# Patient Record
Sex: FEMALE | Race: BLACK OR AFRICAN AMERICAN | Employment: OTHER | URBAN - METROPOLITAN AREA
[De-identification: names, ages, dates, MRNs, and addresses within clinical notes are randomized per-mention and may not be internally consistent; named-entity substitution may affect disease eponyms.]

---

## 2017-01-05 RX ORDER — ESOMEPRAZOLE MAGNESIUM 40 MG/1
40 CAPSULE, DELAYED RELEASE ORAL DAILY
Qty: 90 CAP | Refills: 3 | Status: SHIPPED | OUTPATIENT
Start: 2017-01-05 | End: 2017-04-21 | Stop reason: SDUPTHER

## 2017-01-05 RX ORDER — PIOGLITAZONE HCL AND METFORMIN HCL 500; 15 MG/1; MG/1
1 TABLET ORAL 2 TIMES DAILY WITH MEALS
Qty: 180 TAB | Refills: 3 | Status: SHIPPED | OUTPATIENT
Start: 2017-01-05 | End: 2017-04-21 | Stop reason: SDUPTHER

## 2017-01-05 RX ORDER — FUROSEMIDE 40 MG/1
40 TABLET ORAL DAILY
Qty: 90 TAB | Refills: 3 | Status: SHIPPED | OUTPATIENT
Start: 2017-01-05 | End: 2017-04-21 | Stop reason: SDUPTHER

## 2017-01-05 RX ORDER — SPIRONOLACTONE 25 MG/1
25 TABLET ORAL 2 TIMES DAILY
Qty: 180 TAB | Refills: 3 | Status: SHIPPED | OUTPATIENT
Start: 2017-01-05 | End: 2017-04-21 | Stop reason: SDUPTHER

## 2017-01-20 ENCOUNTER — OFFICE VISIT (OUTPATIENT)
Dept: FAMILY MEDICINE CLINIC | Age: 77
End: 2017-01-20

## 2017-01-20 VITALS
TEMPERATURE: 96.9 F | BODY MASS INDEX: 37.38 KG/M2 | RESPIRATION RATE: 18 BRPM | SYSTOLIC BLOOD PRESSURE: 166 MMHG | HEART RATE: 64 BPM | HEIGHT: 61 IN | OXYGEN SATURATION: 100 % | WEIGHT: 198 LBS | DIASTOLIC BLOOD PRESSURE: 67 MMHG

## 2017-01-20 DIAGNOSIS — N18.30 ANEMIA OF CHRONIC KIDNEY FAILURE, STAGE 3 (MODERATE) (HCC): ICD-10-CM

## 2017-01-20 DIAGNOSIS — D63.1 ANEMIA OF CHRONIC KIDNEY FAILURE, STAGE 3 (MODERATE) (HCC): ICD-10-CM

## 2017-01-20 DIAGNOSIS — N18.30 CONTROLLED TYPE 2 DIABETES MELLITUS WITH STAGE 3 CHRONIC KIDNEY DISEASE, WITHOUT LONG-TERM CURRENT USE OF INSULIN (HCC): Primary | ICD-10-CM

## 2017-01-20 DIAGNOSIS — I10 ESSENTIAL HYPERTENSION: ICD-10-CM

## 2017-01-20 DIAGNOSIS — E11.22 CONTROLLED TYPE 2 DIABETES MELLITUS WITH STAGE 3 CHRONIC KIDNEY DISEASE, WITHOUT LONG-TERM CURRENT USE OF INSULIN (HCC): Primary | ICD-10-CM

## 2017-01-20 LAB
HBA1C MFR BLD HPLC: 5.2 %
HGB BLD-MCNC: 8.3 G/DL

## 2017-01-20 RX ORDER — FLUTICASONE PROPIONATE 50 MCG
2 SPRAY, SUSPENSION (ML) NASAL EVERY EVENING
Qty: 3 BOTTLE | Refills: 3 | Status: SHIPPED | OUTPATIENT
Start: 2017-01-20 | End: 2018-09-07 | Stop reason: ALTCHOICE

## 2017-01-20 RX ORDER — BROMPHENIRAMINE MALEATE, PSEUDOEPHEDRINE HYDROCHLORIDE, AND DEXTROMETHORPHAN HYDROBROMIDE 2; 30; 10 MG/5ML; MG/5ML; MG/5ML
5 SYRUP ORAL
Qty: 120 ML | Refills: 1 | Status: SHIPPED | OUTPATIENT
Start: 2017-01-20 | End: 2018-03-20 | Stop reason: ALTCHOICE

## 2017-01-20 NOTE — PROGRESS NOTES
Chief Complaint   Patient presents with    Dizziness    Anemia    Ear Fullness     Pain scale 0/10      1. Have you been to the ER, urgent care clinic since your last visit? Hospitalized since your last visit? No    2. Have you seen or consulted any other health care providers outside of the [address] [place] [address] since your last visit? Include any pap smears or colon screening.  No

## 2017-01-20 NOTE — PROGRESS NOTES
HISTORY OF PRESENT ILLNESS  Latha Rivera is a 77 y.o. female. HPI  C/o fatigue, dizziness  hbp stable  ckd stable  Review of Systems   Constitutional: Positive for malaise/fatigue. Neurological: Positive for dizziness. All other systems reviewed and are negative. Past Medical History   Diagnosis Date    Cancer ([facility]      colon    Diabetes ([facility]     Hypertension      Current Outpatient Prescriptions on File Prior to Visit   Medication Sig Dispense Refill    Iron BisGl &PS Eoa-F-O06-FA-Ca (Carlos Ahumada) 656-11-73-0 mg-mg-mcg-mg cap Take 2 Caps by mouth daily. 180 Cap 1    furosemide (LASIX) 40 mg tablet Take 1 Tab by mouth daily. 90 Tab 3    esomeprazole (NEXIUM) 40 mg capsule Take 1 Cap by mouth daily. 90 Cap 3    spironolactone (ALDACTONE) 25 mg tablet Take 1 Tab by mouth two (2) times a day. 180 Tab 3    pioglitazone-metFORMIN (ACTOPLUS MET)  mg per tablet Take 1 Tab by mouth two (2) times daily (with meals). 180 Tab 3    diclofenac EC (VOLTAREN) 75 mg EC tablet Take 1 Tab by mouth once over twenty-four (24) hours. 30 Tab 1    telmisartan (MICARDIS) 80 mg tablet Take 1 Tab by mouth daily. 90 Tab 3    colesevelam (WELCHOL) 625 mg tablet Take 2 Tabs by mouth two (2) times daily (with meals). 360 Tab 1    glucose blood VI test strips (FREESTYLE LITE STRIPS) strip Test daily 100 strip 3    travoprost (TRAVATAN Z) 0.004 % ophthalmic solution Administer 1 Drop to both eyes every evening. 5 mL 3    aspirin 81 mg tablet Take 1 Tab by mouth daily. With a meal 90 Tab 0     No current facility-administered medications on file prior to visit. Visit Vitals    /67 (BP 1 Location: Right arm, BP Patient Position: Sitting)    Pulse 64    Temp 96.9 °F (36.1 °C) (Oral)    Resp 18    Ht 5' 1\" (1.549 m)    Wt 198 lb (89.8 kg)    SpO2 100%    BMI 37.41 kg/m2         Physical Exam   Constitutional: She appears well-developed and well-nourished. No distress.    Musculoskeletal: Normal range of motion. She exhibits no edema, tenderness or deformity. Skin: Skin is warm and dry. No rash noted. She is not diaphoretic. No erythema. There is pallor. Vitals reviewed.     Reviewed labs  Anemia of kidney dis  hgb 8.3  A1C 5.2  ASSESSMENT and PLAN  anemia   aodm  hbp  Plan  Tstart procrit

## 2017-01-20 NOTE — MR AVS SNAPSHOT
Visit Information Date & Time Provider Department Dept. Phone Encounter #  
 1/20/2017  9:45 AM Anai Olivares, [employer] [phone] [id number] Upcoming Health Maintenance Date Due INFLUENZA AGE 9 TO ADULT 8/1/2016 FOOT EXAM Q1 5/4/2017 HEMOGLOBIN A1C Q6M 7/20/2017 MEDICARE YEARLY EXAM 8/6/2017 MICROALBUMIN Q1 8/23/2017 LIPID PANEL Q1 8/23/2017 EYE EXAM RETINAL OR DILATED Q1 10/11/2017 Pneumococcal 65+ Low/Medium Risk (2 of 2 - PPSV23) 1/20/2018 GLAUCOMA SCREENING Q2Y 10/11/2018 DTaP/Tdap/Td series (2 - Td) 8/17/2025 Allergies as of 1/20/2017  Review Complete On: 1/20/2017 By: Anai Olivares MD  
  
 Severity Noted Reaction Type Reactions Adhesive  10/05/2016    Other (comments) Skin tears Amoxicillin  09/28/2010    Hives Current Immunizations  Reviewed on 8/17/2015 Name Date Influenza Vaccine 10/24/2013  2:45 PM  
 Tdap 8/17/2015 11:51 AM  
  
 Not reviewed this visit You Were Diagnosed With   
  
 Codes Comments Controlled type 2 diabetes mellitus with stage 3 chronic kidney disease, without long-term current use of insulin (HCC)    -  Primary ICD-10-CM: E11.22, N18.3 ICD-9-CM: 250.40, 585.3 Essential hypertension     ICD-10-CM: I10 
ICD-9-CM: 401.9 Anemia of chronic kidney failure, stage 3 (moderate)     ICD-10-CM: N18.3, D63.1 ICD-9-CM: 285.21, 585.3 Vitals BP Pulse Temp Resp Height(growth percentile) Weight(growth percentile) 166/67 (BP 1 Location: Right arm, BP Patient Position: Sitting) 64 96.9 °F (36.1 °C) (Oral) 18 5' 1\" (1.549 m) 198 lb (89.8 kg) SpO2 BMI OB Status Smoking Status 100% 37.41 kg/m2 Postmenopausal Never Smoker Vitals History BMI and BSA Data Body Mass Index Body Surface Area  
 37.41 kg/m 2 1.97 m 2 Preferred Pharmacy Pharmacy Name Phone  [address] [facility] [phone] Your Updated Medication List  
  
   
This list is accurate as of: 17 10:49 AM.  Always use your most recent med list.  
  
  
  
  
 aspirin 81 mg tablet Take 1 Tab by mouth daily. With a meal  
  
 Brompheniramine-Pseudoeph-DM 2-30-10 mg/5 mL syrup Commonly known as:  BROMFED DM Take 5 mL by mouth four (4) times daily as needed. colesevelam 625 mg tablet Commonly known as:  [facility] Take 2 Tabs by mouth two (2) times daily (with meals). diclofenac EC 75 mg EC tablet Commonly known as:  VOLTAREN Take 1 Tab by mouth once over twenty-four (24) hours. epoetin jasmin 40,000 unit/mL injection Commonly known as:  PROCRIT  
1 mL by SubCUTAneous route once for 1 dose. esomeprazole 40 mg capsule Commonly known as:  NexIUM Take 1 Cap by mouth daily. fluticasone 50 mcg/actuation nasal spray Commonly known as:  Mounika Frdemetrius 2 Sprays by Both Nostrils route every evening. furosemide 40 mg tablet Commonly known as:  LASIX Take 1 Tab by mouth daily. glucose blood VI test strips strip Commonly known as:  FREESTYLE LITE STRIPS Test daily Iron BisGl &PS Ylo-D-G33-FA-Ca 389-46-22-6 mg-mg-mcg-mg Cap Commonly known as:  Fairy Ficks Take 2 Caps by mouth daily. pioglitazone-metFORMIN  mg per tablet Commonly known as:  actoplus met Take 1 Tab by mouth two (2) times daily (with meals). spironolactone 25 mg tablet Commonly known as:  ALDACTONE Take 1 Tab by mouth two (2) times a day. telmisartan 80 mg tablet Commonly known as:  Monzon Sarkar Take 1 Tab by mouth daily. travoprost 0.004 % ophthalmic solution Commonly known as:  TRAVATAN Z Administer 1 Drop to both eyes every evening. Prescriptions Sent to Pharmacy Refills  
 fluticasone (FLONASE) 50 mcg/actuation nasal spray 3 Si Sprays by Both Nostrils route every evening.   
 Class: Normal  
 Pharmacy: [address], [address] [place] [address] Ph #: [phone] Route: Both Nostrils Brompheniramine-Pseudoeph-DM (BROMFED DM) 2-30-10 mg/5 mL syrup 1 Sig: Take 5 mL by mouth four (4) times daily as needed. Class: Normal  
 Pharmacy: [address], [address] [place] [address] Ph #: [phone] Route: Oral  
  
We Performed the Following AMB POC HEMOGLOBIN (HGB) [70612 CPT(R)] AMB POC HEMOGLOBIN A1C [68315 CPT(R)] Introducing [facility] & HEALTH SERVICES! [employer] introduces [employer] patient portal. Now you can access parts of your medical record, email your doctor's office, and request medication refills online. 1. In your internet browser, go to [email or web address] 2. Click on the First Time User? Click Here link in the Sign In box. You will see the New Member Sign Up page. 3. Enter your [employer] Access Code exactly as it appears below. You will not need to use this code after youve completed the sign-up process. If you do not sign up before the expiration date, you must request a new code. · [employer] Access Code: [email or web address] Expires: 2/2/2017 10:19 AM 
 
4. Enter the last four digits of your Social Security Number (xxxx) and Date of Birth (mm/dd/yyyy) as indicated and click Submit. You will be taken to the next sign-up page. 5. Create a [employer] ID. This will be your [employer] login ID and cannot be changed, so think of one that is secure and easy to remember. 6. Create a [employer] password. You can change your password at any time. 7. Enter your Password Reset Question and Answer. This can be used at a later time if you forget your password. 8. Enter your e-mail address. You will receive e-mail notification when new information is available in [address] 9. Click Sign Up. You can now view and download portions of your medical record. 10. Click the Download Summary menu link to download a portable copy of your medical information. If you have questions, please visit the Frequently Asked Questions section of the [employer] website. Remember, [employer] is NOT to be used for urgent needs. For medical emergencies, dial 911. Now available from your iPhone and Android! Please provide this summary of care documentation to your next provider. Your primary care clinician is listed as Gwyn Herbert. If you have any questions after today's visit, please call [phone].

## 2017-02-27 ENCOUNTER — TELEPHONE (OUTPATIENT)
Dept: FAMILY MEDICINE CLINIC | Age: 77
End: 2017-02-27

## 2017-02-28 ENCOUNTER — OFFICE VISIT (OUTPATIENT)
Dept: FAMILY MEDICINE CLINIC | Age: 77
End: 2017-02-28

## 2017-02-28 VITALS
TEMPERATURE: 97.6 F | BODY MASS INDEX: 36.44 KG/M2 | SYSTOLIC BLOOD PRESSURE: 179 MMHG | RESPIRATION RATE: 20 BRPM | HEIGHT: 61 IN | WEIGHT: 193 LBS | HEART RATE: 71 BPM | DIASTOLIC BLOOD PRESSURE: 61 MMHG

## 2017-02-28 DIAGNOSIS — J01.90 ACUTE SINUSITIS, RECURRENCE NOT SPECIFIED, UNSPECIFIED LOCATION: Primary | ICD-10-CM

## 2017-02-28 DIAGNOSIS — D64.9 CHRONIC ANEMIA: ICD-10-CM

## 2017-02-28 LAB — HGB BLD-MCNC: 10.2 G/DL

## 2017-02-28 RX ORDER — BROMPHENIRAMINE MALEATE, PSEUDOEPHEDRINE HYDROCHLORIDE, AND DEXTROMETHORPHAN HYDROBROMIDE 2; 30; 10 MG/5ML; MG/5ML; MG/5ML
5 SYRUP ORAL
Qty: 120 ML | Refills: 1 | Status: SHIPPED | OUTPATIENT
Start: 2017-02-28 | End: 2017-05-05 | Stop reason: SDUPTHER

## 2017-02-28 RX ORDER — AZITHROMYCIN 500 MG/1
500 TABLET, FILM COATED ORAL DAILY
Qty: 6 TAB | Refills: 0 | Status: SHIPPED | OUTPATIENT
Start: 2017-02-28 | End: 2017-03-06

## 2017-02-28 NOTE — PROGRESS NOTES
Chief Complaint   Patient presents with    Cough    Pressure Behind the Eyes     Pain scale 0/10      1. Have you been to the ER, urgent care clinic since your last visit? Hospitalized since your last visit? No    2. Have you seen or consulted any other health care providers outside of the [facility] since your last visit? Include any pap smears or colon screening.  No

## 2017-02-28 NOTE — MR AVS SNAPSHOT
Visit Information Date & Time Provider Department Dept. Phone Encounter #  
 2/28/2017 11:15 AM Zandra Ecketr MD [address] [place] [address] [phone] [id number] Follow-up Instructions Return if symptoms worsen or fail to improve. Follow-up and Disposition History Upcoming Health Maintenance Date Due INFLUENZA AGE 9 TO ADULT 8/1/2016 Pneumococcal 65+ High/Highest Risk (2 of 2 - PPSV23) 4/25/2017 FOOT EXAM Q1 5/4/2017 HEMOGLOBIN A1C Q6M 7/20/2017 MEDICARE YEARLY EXAM 8/6/2017 MICROALBUMIN Q1 8/23/2017 LIPID PANEL Q1 8/23/2017 EYE EXAM RETINAL OR DILATED Q1 10/11/2017 GLAUCOMA SCREENING Q2Y 10/11/2018 DTaP/Tdap/Td series (2 - Td) 8/17/2025 Allergies as of 2/28/2017  Review Complete On: 2/28/2017 By: Zandra Eckert MD  
  
 Severity Noted Reaction Type Reactions Adhesive  10/05/2016    Other (comments) Skin tears Amoxicillin  09/28/2010    Hives Current Immunizations  Reviewed on 8/17/2015 Name Date Influenza Vaccine 10/24/2013  2:45 PM  
 Tdap 8/17/2015 11:51 AM  
  
 Not reviewed this visit You Were Diagnosed With   
  
 Codes Comments Acute sinusitis, recurrence not specified, unspecified location    -  Primary ICD-10-CM: J01.90 ICD-9-CM: 461.9 Chronic anemia     ICD-10-CM: D64.9 ICD-9-CM: 186. 9 Vitals BP  
  
  
  
  
  
 179/61 (BP 1 Location: Right arm, BP Patient Position: Sitting) BMI and BSA Data Body Mass Index Body Surface Area  
 36.47 kg/m 2 1.94 m 2 Preferred Pharmacy Pharmacy Name Phone [id number] [address], [place] [address] [address] [phone] Your Updated Medication List  
  
   
This list is accurate as of: 2/28/17 11:39 AM.  Always use your most recent med list.  
  
  
  
  
 aspirin 81 mg tablet Take 1 Tab by mouth daily. With a meal  
  
 azithromycin 500 mg Tab Commonly known as:  Sherwin Littlejohn Take 1 Tab by mouth daily for 6 days. * Brompheniramine-Pseudoeph-DM 2-30-10 mg/5 mL syrup Commonly known as:  BROMFED DM Take 5 mL by mouth four (4) times daily as needed. * Brompheniramine-Pseudoeph-DM 2-30-10 mg/5 mL syrup Commonly known as:  BROMFED DM Take 5 mL by mouth four (4) times daily as needed. colesevelam 625 mg tablet Commonly known as:  [facility] Take 2 Tabs by mouth two (2) times daily (with meals). diclofenac EC 75 mg EC tablet Commonly known as:  VOLTAREN Take 1 Tab by mouth once over twenty-four (24) hours. esomeprazole 40 mg capsule Commonly known as:  NexIUM Take 1 Cap by mouth daily. fluticasone 50 mcg/actuation nasal spray Commonly known as:  Gus Gerda 2 Sprays by Both Nostrils route every evening. furosemide 40 mg tablet Commonly known as:  LASIX Take 1 Tab by mouth daily. glucose blood VI test strips strip Commonly known as:  FREESTYLE LITE STRIPS Test daily Iron BisGl &PS Tfl-G-E91-FA-Ca 258-39-86-5 mg-mg-mcg-mg Cap Commonly known as:  Fletcher Roughen Take 2 Caps by mouth daily. pioglitazone-metFORMIN  mg per tablet Commonly known as:  actoplus met Take 1 Tab by mouth two (2) times daily (with meals). spironolactone 25 mg tablet Commonly known as:  ALDACTONE Take 1 Tab by mouth two (2) times a day. telmisartan 80 mg tablet Commonly known as:  Donice Quirk Take 1 Tab by mouth daily. travoprost 0.004 % ophthalmic solution Commonly known as:  TRAVATAN Z Administer 1 Drop to both eyes every evening. * Notice: This list has 2 medication(s) that are the same as other medications prescribed for you. Read the directions carefully, and ask your doctor or other care provider to review them with you. Prescriptions Sent to Pharmacy Refills  
 azithromycin (ZITHROMAX) 500 mg tab 0 Sig: Take 1 Tab by mouth daily for 6 days.   
 Class: Normal  
 [facility]: [id number] [address] [place] [address] Ph #: [phone] Route: Oral  
 Brompheniramine-Pseudoeph-DM (BROMFED DM) 2-30-10 mg/5 mL syrup 1 Sig: Take 5 mL by mouth four (4) times daily as needed. Class: Normal  
 [facility]acy: [facility] [address] [place] [address] Ph #: [phone] Route: Oral  
  
We Performed the Following AMB POC HEMOGLOBIN (HGB) [46676 CPT(R)] Follow-up Instructions Return if symptoms worsen or fail to improve. Introducing [facility] & HEALTH SERVICES! Rosalind Ny introduces [employer] patient portal. Now you can access parts of your medical record, email your doctor's office, and request medication refills online. 1. In your internet browser, go to [email or web address] 2. Click on the First Time User? Click Here link in the Sign In box. You will see the New Member Sign Up page. 3. Enter your [employer] Access Code exactly as it appears below. You will not need to use this code after youve completed the sign-up process. If you do not sign up before the expiration date, you must request a new code. · [employer] Access Code: [email or web address] Expires: 5/29/2017 11:39 AM 
 
4. Enter the last four digits of your Social Security Number (xxxx) and Date of Birth (mm/dd/yyyy) as indicated and click Submit. You will be taken to the next sign-up page. 5. Create a [employer] ID. This will be your [employer] login ID and cannot be changed, so think of one that is secure and easy to remember. 6. Create a [employer] password. You can change your password at any time. 7. Enter your Password Reset Question and Answer. This can be used at a later time if you forget your password. 8. Enter your e-mail address. You will receive e-mail notification when new information is available in [address] 9. Click Sign Up. You can now view and download portions of your medical record. 10. Click the Download Summary menu link to download a portable copy of your medical information. If you have questions, please visit the Frequently Asked Questions section of the [employer] website. Remember, [employer] is NOT to be used for urgent needs. For medical emergencies, dial 911. Now available from your iPhone and Android! Please provide this summary of care documentation to your next provider. Your primary care clinician is listed as Mya Pearson. If you have any questions after today's visit, please call [phone].

## 2017-02-28 NOTE — PROGRESS NOTES
HISTORY OF PRESENT ILLNESS  Selene Naidu is a 77 y.o. female. HPI  Chronic anemia stable  Cough x 2 weeks  A/w sputum  Review of Systems   Respiratory: Positive for cough and sputum production. All other systems reviewed and are negative. Past Medical History:   Diagnosis Date    Cancer ([facility]     colon    Diabetes ([facility]     Hypertension      Current Outpatient Prescriptions on File Prior to Visit   Medication Sig Dispense Refill    fluticasone (FLONASE) 50 mcg/actuation nasal spray 2 Sprays by Both Nostrils route every evening. 3 Bottle 3    Brompheniramine-Pseudoeph-DM (BROMFED DM) 2-30-10 mg/5 mL syrup Take 5 mL by mouth four (4) times daily as needed. 120 mL 1    Iron BisGl &PS Ynt-C-E48-FA-Ca (NANCY FORTE) 746-87-36-0 mg-mg-mcg-mg cap Take 2 Caps by mouth daily. 180 Cap 1    furosemide (LASIX) 40 mg tablet Take 1 Tab by mouth daily. 90 Tab 3    esomeprazole (NEXIUM) 40 mg capsule Take 1 Cap by mouth daily. 90 Cap 3    spironolactone (ALDACTONE) 25 mg tablet Take 1 Tab by mouth two (2) times a day. 180 Tab 3    pioglitazone-metFORMIN (ACTOPLUS MET)  mg per tablet Take 1 Tab by mouth two (2) times daily (with meals). 180 Tab 3    diclofenac EC (VOLTAREN) 75 mg EC tablet Take 1 Tab by mouth once over twenty-four (24) hours. 30 Tab 1    telmisartan (MICARDIS) 80 mg tablet Take 1 Tab by mouth daily. 90 Tab 3    colesevelam (WELCHOL) 625 mg tablet Take 2 Tabs by mouth two (2) times daily (with meals). 360 Tab 1    glucose blood VI test strips (FREESTYLE LITE STRIPS) strip Test daily 100 strip 3    travoprost (TRAVATAN Z) 0.004 % ophthalmic solution Administer 1 Drop to both eyes every evening. 5 mL 3    aspirin 81 mg tablet Take 1 Tab by mouth daily. With a meal 90 Tab 0     No current facility-administered medications on file prior to visit.       Visit Vitals    /61 (BP 1 Location: Right arm, BP Patient Position: Sitting)    Pulse 71    Temp 97.6 °F (36.4 °C) (Oral)    Resp 20  Ht 5' 1\" (1.549 m)    Wt 193 lb (87.5 kg)    BMI 36.47 kg/m2         Physical Exam   Constitutional: She appears well-developed and well-nourished. No distress. HENT:   Head: Normocephalic and atraumatic. Mouth/Throat: No oropharyngeal exudate. Tm dull x 2   Pulmonary/Chest: Effort normal. No respiratory distress. She has no wheezes. She has no rales. She exhibits no tenderness.    + ronchi   Skin: She is not diaphoretic.   hgb-10.2    ASSESSMENT and PLAN  chronic anemia   Acute sinusitis  Plan  antibx  bromfed

## 2017-04-21 RX ORDER — ESOMEPRAZOLE MAGNESIUM 40 MG/1
40 CAPSULE, DELAYED RELEASE ORAL DAILY
Qty: 90 CAP | Refills: 3 | Status: SHIPPED | OUTPATIENT
Start: 2017-04-21 | End: 2017-07-25 | Stop reason: SDUPTHER

## 2017-04-21 RX ORDER — FUROSEMIDE 40 MG/1
40 TABLET ORAL DAILY
Qty: 90 TAB | Refills: 3 | Status: SHIPPED | OUTPATIENT
Start: 2017-04-21 | End: 2017-07-25 | Stop reason: SDUPTHER

## 2017-04-21 RX ORDER — TELMISARTAN 80 MG/1
80 TABLET ORAL DAILY
Qty: 90 TAB | Refills: 3 | Status: SHIPPED | OUTPATIENT
Start: 2017-04-21 | End: 2017-08-30 | Stop reason: SDUPTHER

## 2017-04-21 RX ORDER — PIOGLITAZONE HCL AND METFORMIN HCL 500; 15 MG/1; MG/1
1 TABLET ORAL 2 TIMES DAILY WITH MEALS
Qty: 180 TAB | Refills: 3 | Status: SHIPPED | OUTPATIENT
Start: 2017-04-21 | End: 2017-07-25 | Stop reason: SDUPTHER

## 2017-04-21 RX ORDER — SPIRONOLACTONE 25 MG/1
25 TABLET ORAL 2 TIMES DAILY
Qty: 180 TAB | Refills: 3 | Status: SHIPPED | OUTPATIENT
Start: 2017-04-21 | End: 2017-07-25 | Stop reason: SDUPTHER

## 2017-04-26 ENCOUNTER — TELEPHONE (OUTPATIENT)
Dept: FAMILY MEDICINE CLINIC | Age: 77
End: 2017-04-26

## 2017-04-26 ENCOUNTER — OFFICE VISIT (OUTPATIENT)
Dept: FAMILY MEDICINE CLINIC | Age: 77
End: 2017-04-26

## 2017-04-26 VITALS
HEART RATE: 58 BPM | HEIGHT: 61 IN | RESPIRATION RATE: 20 BRPM | TEMPERATURE: 98.2 F | BODY MASS INDEX: 36.51 KG/M2 | DIASTOLIC BLOOD PRESSURE: 57 MMHG | WEIGHT: 193.4 LBS | OXYGEN SATURATION: 98 % | SYSTOLIC BLOOD PRESSURE: 155 MMHG

## 2017-04-26 DIAGNOSIS — J45.31 ASTHMATIC BRONCHITIS, MILD PERSISTENT, WITH ACUTE EXACERBATION: Primary | ICD-10-CM

## 2017-04-26 RX ORDER — HYDROCODONE POLISTIREX AND CHLORPHENIRAMINE POLISTIREX 10; 8 MG/5ML; MG/5ML
5 SUSPENSION, EXTENDED RELEASE ORAL
Qty: 120 ML | Refills: 0 | Status: SHIPPED | OUTPATIENT
Start: 2017-04-26 | End: 2017-05-05 | Stop reason: SDUPTHER

## 2017-04-26 RX ORDER — DEXAMETHASONE SODIUM PHOSPHATE 4 MG/ML
4 INJECTION, SOLUTION INTRA-ARTICULAR; INTRALESIONAL; INTRAMUSCULAR; INTRAVENOUS; SOFT TISSUE ONCE
Qty: 1 VIAL | Refills: 0
Start: 2017-04-26 | End: 2017-04-26

## 2017-04-26 RX ORDER — LEVOFLOXACIN 500 MG/1
500 TABLET, FILM COATED ORAL DAILY
Qty: 7 TAB | Refills: 0 | Status: SHIPPED | OUTPATIENT
Start: 2017-04-26 | End: 2017-05-03

## 2017-04-26 RX ORDER — METHYLPREDNISOLONE 4 MG/1
TABLET ORAL
Qty: 1 DOSE PACK | Refills: 0 | Status: SHIPPED | OUTPATIENT
Start: 2017-04-26 | End: 2018-03-20 | Stop reason: ALTCHOICE

## 2017-04-26 RX ORDER — GUAIFENESIN 600 MG/1
600 TABLET, EXTENDED RELEASE ORAL 2 TIMES DAILY
Qty: 20 TAB | Refills: 0 | Status: SHIPPED | OUTPATIENT
Start: 2017-04-26 | End: 2017-05-05 | Stop reason: SDUPTHER

## 2017-04-26 NOTE — PROGRESS NOTES
Paul Muhammad is a 77 y.o. female here today with c/o cold symptoms (cough, runny nose and eyes) presented 3 days ago. 1. Have you been to the ER, urgent care clinic since your last visit? Hospitalized since your last visit? No    2. Have you seen or consulted any other health care providers outside of the [address] [place] [address] since your last visit? Include any pap smears or colon screening.  No

## 2017-04-26 NOTE — TELEPHONE ENCOUNTER
Pt called stating \"cough syrup Dr. Jocy Hickman filled is not enough\" she states the medication is not relieving her symptoms. Pt is complaining of headaches, sinus pain and pressure, and chest pain from coughing. Pt declines appt with available providers.  Please advise

## 2017-04-26 NOTE — PROGRESS NOTES
Per verbal orders of , injection of dexamethasone 4mg given by Wood Valera LPN. Patient instructed to remain in clinic for 20 minutes afterwards, and to report any adverse reaction to me immediately. Medication documentation completed.

## 2017-04-26 NOTE — MR AVS SNAPSHOT
Visit Information Date & Time Provider Department Dept. Phone Encounter #  
 4/26/2017 11:45 AM Fernando Sawyer MD [address] [place] [address] [phone] [id number] Follow-up Instructions Return in about 1 week (around 5/3/2017). Upcoming Health Maintenance Date Due INFLUENZA AGE 9 TO ADULT 8/1/2016 Pneumococcal 65+ High/Highest Risk (2 of 2 - PPSV23) 4/25/2017 FOOT EXAM Q1 5/4/2017 HEMOGLOBIN A1C Q6M 7/20/2017 MEDICARE YEARLY EXAM 8/6/2017 MICROALBUMIN Q1 8/23/2017 LIPID PANEL Q1 8/23/2017 EYE EXAM RETINAL OR DILATED Q1 10/11/2017 GLAUCOMA SCREENING Q2Y 10/11/2018 DTaP/Tdap/Td series (2 - Td) 8/17/2025 Allergies as of 4/26/2017  Review Complete On: 4/26/2017 By: Fernando Sawyer MD  
  
 Severity Noted Reaction Type Reactions Adhesive  10/05/2016    Other (comments) Skin tears Amoxicillin  09/28/2010    Hives Current Immunizations  Reviewed on 8/17/2015 Name Date Influenza Vaccine 10/24/2013  2:45 PM  
 Tdap 8/17/2015 11:51 AM  
  
 Not reviewed this visit You Were Diagnosed With   
  
 Codes Comments Asthmatic bronchitis, mild persistent, with acute exacerbation    -  Primary ICD-10-CM: J45.31 
ICD-9-CM: 493.92 Vitals BP Pulse Temp Resp Height(growth percentile) Weight(growth percentile) 155/57 (!) 58 98.2 °F (36.8 °C) 20 5' 1\" (1.549 m) 193 lb 6.4 oz (87.7 kg) SpO2 BMI OB Status Smoking Status 98% 36.54 kg/m2 Postmenopausal Never Smoker Vitals History BMI and BSA Data Body Mass Index Body Surface Area  
 36.54 kg/m 2 1.94 m 2 Preferred Pharmacy Pharmacy Name Phone [id number] [address] [address] [phone] Your Updated Medication List  
  
   
This list is accurate as of: 4/26/17 12:17 PM.  Always use your most recent med list.  
  
  
  
  
 aspirin 81 mg tablet Take 1 Tab by mouth daily. With a meal  
  
 * Brompheniramine-Pseudoeph-DM 2-30-10 mg/5 mL syrup Commonly known as:  BROMFED DM Take 5 mL by mouth four (4) times daily as needed. * Brompheniramine-Pseudoeph-DM 2-30-10 mg/5 mL syrup Commonly known as:  BROMFED DM Take 5 mL by mouth four (4) times daily as needed. chlorpheniramine-HYDROcodone 10-8 mg/5 mL suspension Commonly known as:  Juldonnatte Minder Take 5 mL by mouth every twelve (12) hours as needed for Cough. Max Daily Amount: 10 mL. colesevelam 625 mg tablet Commonly known as:  [facility] Take 2 Tabs by mouth two (2) times daily (with meals). dexamethasone 4 mg/mL injection Commonly known as:  DECADRON  
1 mL by IntraMUSCular route once for 1 dose. diclofenac EC 75 mg EC tablet Commonly known as:  VOLTAREN Take 1 Tab by mouth once over twenty-four (24) hours. esomeprazole 40 mg capsule Commonly known as:  NexIUM Take 1 Cap by mouth daily. fluticasone 50 mcg/actuation nasal spray Commonly known as:  Jojo Glenn 2 Sprays by Both Nostrils route every evening. furosemide 40 mg tablet Commonly known as:  LASIX Take 1 Tab by mouth daily. glucose blood VI test strips strip Commonly known as:  FREESTYLE LITE STRIPS Test daily  
  
 guaiFENesin  mg ER tablet Commonly known as:  Shabbir & Shabbir Take 1 Tab by mouth two (2) times a day. Iron BisGl &PS Uja-D-M75-FA-Ca 210-86-65-4 mg-mg-mcg-mg Cap Commonly known as:  Yobani Clock Take 2 Caps by mouth daily. levoFLOXacin 500 mg tablet Commonly known as:  Adelbert Perks Take 1 Tab by mouth daily for 7 days. Take with probiotic  
  
 methylPREDNISolone 4 mg tablet Commonly known as:  Katarina  Start on Thursday  
  
 pioglitazone-metFORMIN  mg per tablet Commonly known as:  actoplus met Take 1 Tab by mouth two (2) times daily (with meals). spironolactone 25 mg tablet Commonly known as:  ALDACTONE  
 Take 1 Tab by mouth two (2) times a day. telmisartan 80 mg tablet Commonly known as:  Ophelia Stewart Take 1 Tab by mouth daily. travoprost 0.004 % ophthalmic solution Commonly known as:  TRAVATAN Z Administer 1 Drop to both eyes every evening. * Notice: This list has 2 medication(s) that are the same as other medications prescribed for you. Read the directions carefully, and ask your doctor or other care provider to review them with you. Prescriptions Printed Refills  
 chlorpheniramine-HYDROcodone (TUSSIONEX) 10-8 mg/5 mL suspension 0 Sig: Take 5 mL by mouth every twelve (12) hours as needed for Cough. Max Daily Amount: 10 mL. Class: Print Route: Oral  
  
Prescriptions Sent to Pharmacy Refills  
 methylPREDNISolone (MEDROL DOSEPACK) 4 mg tablet 0 Sig: Start on Thursday Class: Normal  
 [facility] [address] [place] [address] Ph #: [phone]  
 levoFLOXacin (LEVAQUIN) 500 mg tablet 0 Sig: Take 1 Tab by mouth daily for 7 days. Take with probiotic Class: Normal  
 Pharmacy: [place] [address] Ph #: [phone] Route: Oral  
 guaiFENesin ER (MUCINEX) 600 mg ER tablet 0 Sig: Take 1 Tab by mouth two (2) times a day. Class: Normal  
 Pharmacy: [place] [address] Ph #: [phone] Route: Oral  
  
We Performed the Following DEXAMETHASONE SODIUM PHOSPHATE INJECTION 1 MG [ [facility]] [place] THER/PROPH/DIAG INJECTION, SUBCUT/IM [id number] CPT(R)] Follow-up Instructions Return in about 1 week (around 5/3/2017). Introducing [facility]! Salome Addison introduces "[employer]" patient portal. Now you can access parts of your medical record, email your doctor's office, and request medication refills online.    
 
1. In your internet browser, go to [email or web address] 2. Click on the First Time User? Click Here link in the Sign In box. You will see the New Member Sign Up page. 3. Enter your [employer] Access Code exactly as it appears below. You will not need to use this code after youve completed the sign-up process. If you do not sign up before the expiration date, you must request a new code. · [employer] Access Code: [email or web address] Expires: 5/29/2017 12:39 PM 
 
4. Enter the last four digits of your Social Security Number (xxxx) and Date of Birth (mm/dd/yyyy) as indicated and click Submit. You will be taken to the next sign-up page. 5. Create a [employer] ID. This will be your [employer] login ID and cannot be changed, so think of one that is secure and easy to remember. 6. Create a [employer] password. You can change your password at any time. 7. Enter your Password Reset Question and Answer. This can be used at a later time if you forget your password. 8. Enter your e-mail address. You will receive e-mail notification when new information is available in [address] 9. Click Sign Up. You can now view and download portions of your medical record. 10. Click the Download Summary menu link to download a portable copy of your medical information. If you have questions, please visit the Frequently Asked Questions section of the [employer] website. Remember, [employer] is NOT to be used for urgent needs. For medical emergencies, dial 911. Now available from your iPhone and Android! Please provide this summary of care documentation to your next provider. Your primary care clinician is listed as Tamiko Cohn. If you have any questions after today's visit, please call [phone].

## 2017-04-26 NOTE — PROGRESS NOTES
HISTORY OF PRESENT ILLNESS  Shannon Pichardo is a 77 y.o. female. HPI  Cough x 2 weeks  A/w dyspnea, sputum  Review of Systems   HENT: Positive for congestion. Respiratory: Positive for cough, sputum production, shortness of breath and wheezing. All other systems reviewed and are negative. Past Medical History:   Diagnosis Date    [facility] ([facility]     colon    [facility]     Hypertension      Current Outpatient Prescriptions on File Prior to Visit   Medication Sig Dispense Refill    pioglitazone-metFORMIN (ACTOPLUS MET)  mg per tablet Take 1 Tab by mouth two (2) times daily (with meals). 180 Tab 3    spironolactone (ALDACTONE) 25 mg tablet Take 1 Tab by mouth two (2) times a day. 180 Tab 3    esomeprazole (NEXIUM) 40 mg capsule Take 1 Cap by mouth daily. 90 Cap 3    furosemide (LASIX) 40 mg tablet Take 1 Tab by mouth daily. 90 Tab 3    telmisartan (MICARDIS) 80 mg tablet Take 1 Tab by mouth daily. 90 Tab 3    Brompheniramine-Pseudoeph-DM (BROMFED DM) 2-30-10 mg/5 mL syrup Take 5 mL by mouth four (4) times daily as needed. 120 mL 1    fluticasone (FLONASE) 50 mcg/actuation nasal spray 2 Sprays by Both Nostrils route every evening. 3 Bottle 3    Brompheniramine-Pseudoeph-DM (BROMFED DM) 2-30-10 mg/5 mL syrup Take 5 mL by mouth four (4) times daily as needed. 120 mL 1    Iron BisGl &PS Ruc-W-D23-FA-Ca (NANCY FORTE) 477-10-10-8 mg-mg-mcg-mg cap Take 2 Caps by mouth daily. 180 Cap 1    diclofenac EC (VOLTAREN) 75 mg EC tablet Take 1 Tab by mouth once over twenty-four (24) hours. 30 Tab 1    colesevelam (WELCHOL) 625 mg tablet Take 2 Tabs by mouth two (2) times daily (with meals). 360 Tab 1    glucose blood VI test strips (FREESTYLE LITE STRIPS) strip Test daily 100 strip 3    travoprost (TRAVATAN Z) 0.004 % ophthalmic solution Administer 1 Drop to both eyes every evening. 5 mL 3    aspirin 81 mg tablet Take 1 Tab by mouth daily.  With a meal 90 Tab 0     No current facility-administered medications on file prior to visit. Visit Vitals    /57    Pulse (!) 58    Temp 98.2 °F (36.8 °C)    Resp 20    Ht 5' 1\" (1.549 m)    Wt 193 lb 6.4 oz (87.7 kg)    SpO2 98%    BMI 36.54 kg/m2         Physical Exam   Constitutional: She appears well-developed and well-nourished. No distress. HENT:   Head: Normocephalic and atraumatic. Mouth/Throat: No oropharyngeal exudate. Tm dull  Nasal mucosal edema   Pulmonary/Chest: Effort normal. No respiratory distress. She has wheezes. She has no rales. She exhibits no tenderness. Skin: She is not diaphoretic. Vitals reviewed.       ASSESSMENT and PLAN  acute asthmatic bronchitis   Plan  Decadron > medrol  levaquin  tussionex  Recheck in 1 week

## 2017-04-27 ENCOUNTER — TELEPHONE (OUTPATIENT)
Dept: FAMILY MEDICINE CLINIC | Age: 77
End: 2017-04-27

## 2017-04-27 NOTE — TELEPHONE ENCOUNTER
Patient stated that she was directed to take Methylprednisolone  2  Tablets before breakfast 1 tablet after lunch and 2  Tablet at bed time. However patient stated that she forgot to take 2 tablets this morning. Patient is asking what she should do. She added that she have not taken any of it yet. Asking to be called back.

## 2017-05-05 ENCOUNTER — OFFICE VISIT (OUTPATIENT)
Dept: FAMILY MEDICINE CLINIC | Age: 77
End: 2017-05-05

## 2017-05-05 VITALS
WEIGHT: 188.4 LBS | RESPIRATION RATE: 18 BRPM | DIASTOLIC BLOOD PRESSURE: 58 MMHG | HEIGHT: 61 IN | OXYGEN SATURATION: 94 % | HEART RATE: 62 BPM | BODY MASS INDEX: 35.57 KG/M2 | SYSTOLIC BLOOD PRESSURE: 144 MMHG | TEMPERATURE: 97.4 F

## 2017-05-05 DIAGNOSIS — J45.41 ASTHMATIC BRONCHITIS, MODERATE PERSISTENT, WITH ACUTE EXACERBATION: Primary | ICD-10-CM

## 2017-05-05 RX ORDER — GUAIFENESIN 600 MG/1
600 TABLET, EXTENDED RELEASE ORAL 2 TIMES DAILY
Qty: 20 TAB | Refills: 0 | Status: SHIPPED | OUTPATIENT
Start: 2017-05-05 | End: 2018-03-20 | Stop reason: ALTCHOICE

## 2017-05-05 RX ORDER — LEVOFLOXACIN 500 MG/1
500 TABLET, FILM COATED ORAL DAILY
COMMUNITY
End: 2017-05-05 | Stop reason: SDUPTHER

## 2017-05-05 RX ORDER — HYDROCODONE POLISTIREX AND CHLORPHENIRAMINE POLISTIREX 10; 8 MG/5ML; MG/5ML
5 SUSPENSION, EXTENDED RELEASE ORAL
Qty: 120 ML | Refills: 0 | Status: SHIPPED | OUTPATIENT
Start: 2017-05-05 | End: 2017-08-02 | Stop reason: SDUPTHER

## 2017-05-05 RX ORDER — LEVOFLOXACIN 500 MG/1
500 TABLET, FILM COATED ORAL DAILY
Qty: 7 TAB | Refills: 0 | Status: SHIPPED | OUTPATIENT
Start: 2017-05-05 | End: 2018-03-20 | Stop reason: ALTCHOICE

## 2017-05-05 NOTE — PROGRESS NOTES
HISTORY OF PRESENT ILLNESS  Joana Michael is a 77 y.o. female. HPI  Asthmatic bronchitis partially improved  Coughing still with sputum, yellow  Some dyspnea  Review of Systems   Respiratory: Positive for cough, sputum production, shortness of breath and wheezing. All other systems reviewed and are negative. Past Medical History:   Diagnosis Date    [facility]     [facility]    Diabetes ([facility]     Hypertension        Current Outpatient Prescriptions:     B.infantis-B.ani-B.long-B.bifi (PROBIOTIC 4X) 10-15 mg TbEC, Take  by mouth., Disp: , Rfl:     chlorpheniramine-HYDROcodone (TUSSIONEX) 10-8 mg/5 mL suspension, Take 5 mL by mouth every twelve (12) hours as needed for Cough. Max Daily Amount: 10 mL., Disp: 120 mL, Rfl: 0    guaiFENesin ER (MUCINEX) 600 mg ER tablet, Take 1 Tab by mouth two (2) times a day., Disp: 20 Tab, Rfl: 0    pioglitazone-metFORMIN (ACTOPLUS MET)  mg per tablet, Take 1 Tab by mouth two (2) times daily (with meals). , Disp: 180 Tab, Rfl: 3    spironolactone (ALDACTONE) 25 mg tablet, Take 1 Tab by mouth two (2) times a day., Disp: 180 Tab, Rfl: 3    esomeprazole (NEXIUM) 40 mg capsule, Take 1 Cap by mouth daily. , Disp: 90 Cap, Rfl: 3    furosemide (LASIX) 40 mg tablet, Take 1 Tab by mouth daily. , Disp: 90 Tab, Rfl: 3    telmisartan (MICARDIS) 80 mg tablet, Take 1 Tab by mouth daily. , Disp: 90 Tab, Rfl: 3    fluticasone (FLONASE) 50 mcg/actuation nasal spray, 2 Sprays by Both Nostrils route every evening., Disp: 3 Bottle, Rfl: 3    Iron BisGl &PS Cym-I-P22-FA-Ca (NANCY FORTE) 348-94-60-1 mg-mg-mcg-mg cap, Take 2 Caps by mouth daily. , Disp: 180 Cap, Rfl: 1    colesevelam (WELCHOL) 625 mg tablet, Take 2 Tabs by mouth two (2) times daily (with meals). , Disp: 360 Tab, Rfl: 1    glucose blood VI test strips (FREESTYLE LITE STRIPS) strip, Test daily, Disp: 100 strip, Rfl: 3    travoprost (TRAVATAN Z) 0.004 % ophthalmic solution, Administer 1 Drop to both eyes every evening., Disp: 5 mL, Rfl: 3    levoFLOXacin (LEVAQUIN) 500 mg tablet, Take 500 mg by mouth daily. , Disp: , Rfl:     methylPREDNISolone (MEDROL DOSEPACK) 4 mg tablet, Start on Thursday, Disp: 1 Dose Pack, Rfl: 0    Brompheniramine-Pseudoeph-DM (BROMFED DM) 2-30-10 mg/5 mL syrup, Take 5 mL by mouth four (4) times daily as needed. , Disp: 120 mL, Rfl: 1    diclofenac EC (VOLTAREN) 75 mg EC tablet, Take 1 Tab by mouth once over twenty-four (24) hours. , Disp: 30 Tab, Rfl: 1    aspirin 81 mg tablet, Take 1 Tab by mouth daily. With a meal, Disp: 90 Tab, Rfl: 0  Visit Vitals    /58 (BP 1 Location: Right arm, BP Patient Position: Sitting)    Pulse 62    Temp 97.4 °F (36.3 °C) (Oral)    Resp 18    Ht 5' 1\" (1.549 m)    Wt 188 lb 6.4 oz (85.5 kg)    BMI 35.6 kg/m2   )2 94%      Physical Exam   Constitutional: She appears well-developed and well-nourished. No distress. Pulmonary/Chest: Effort normal. No respiratory distress. She has wheezes. She has no rales. She exhibits no tenderness. Scattered expiratory wheezing R>L   Skin: She is not diaphoretic. Vitals reviewed.     cxr- general increased markings, ? pneumonitis  ASSESSMENT and PLAN  asthmatic bronchitis   Plan  Continue mrds x 1 week  Recheck in 1 week

## 2017-05-05 NOTE — PROGRESS NOTES
Herman Corbett is a 77 y.o. female  Chief Complaint   Patient presents with    Diabetes     2 months folow up     1. Have you been to the ER, urgent care clinic since your last visit? Hospitalized since your last visit? No    2. Have you seen or consulted any other health care providers outside of the [address] [place] [address] since your last visit? Include any pap smears or colon screening.  No

## 2017-05-05 NOTE — MR AVS SNAPSHOT
Visit Information Date & Time Provider Department Dept. Phone Encounter #  
 5/5/2017  1:45 PM Jorge Keene, [place]  Follow-up Instructions Return in about 1 week (around 5/12/2017). Follow-up and Disposition History Upcoming Health Maintenance Date Due HEMOGLOBIN A1C Q6M 7/20/2017 INFLUENZA AGE 9 TO ADULT 8/1/2017 MEDICARE YEARLY EXAM 8/6/2017 MICROALBUMIN Q1 8/23/2017 LIPID PANEL Q1 8/23/2017 EYE EXAM RETINAL OR DILATED Q1 10/11/2017 FOOT EXAM Q1 5/5/2018 GLAUCOMA SCREENING Q2Y 10/11/2018 DTaP/Tdap/Td series (2 - Td) 8/17/2025 Allergies as of 5/5/2017  Review Complete On: 5/5/2017 By: Jorge Keene MD  
  
 Severity Noted Reaction Type Reactions Adhesive  10/05/2016    Other (comments) Skin tears Amoxicillin  09/28/2010    Hives Current Immunizations  Reviewed on 8/17/2015 Name Date Influenza Vaccine 10/24/2013  2:45 PM  
 Tdap 8/17/2015 11:51 AM  
  
 Not reviewed this visit You Were Diagnosed With   
  
 Codes Comments Asthmatic bronchitis, moderate persistent, with acute exacerbation    -  Primary ICD-10-CM: J45.41 
ICD-9-CM: 493.92 Vitals BP Pulse Temp Resp Height(growth percentile) Weight(growth percentile) 144/58 (BP 1 Location: Right arm, BP Patient Position: Sitting) 62 97.4 °F (36.3 °C) (Oral) 18 5' 1\" (1.549 m) 188 lb 6.4 oz (85.5 kg) SpO2 BMI OB Status Smoking Status 94% 35.6 kg/m2 Postmenopausal Never Smoker Vitals History BMI and BSA Data Body Mass Index Body Surface Area  
 35.6 kg/m 2 1.92 m 2 Preferred Pharmacy Pharmacy Name Phone [id number] [place] [address] [address] [phone] Your Updated Medication List  
  
   
This list is accurate as of: 5/5/17  2:24 PM.  Always use your most recent med list.  
  
  
  
  
 aspirin 81 mg tablet Take 1 Tab by mouth daily. With a meal  
  
 Brompheniramine-Pseudoeph-DM 2-30-10 mg/5 mL syrup Commonly known as:  BROMFED DM Take 5 mL by mouth four (4) times daily as needed. chlorpheniramine-HYDROcodone 10-8 mg/5 mL suspension Commonly known as:  Arlis Specking Take 5 mL by mouth every twelve (12) hours as needed for Cough. Max Daily Amount: 10 mL. colesevelam 625 mg tablet Commonly known as:  [facility] Take 2 Tabs by mouth two (2) times daily (with meals). diclofenac EC 75 mg EC tablet Commonly known as:  VOLTAREN Take 1 Tab by mouth once over twenty-four (24) hours. esomeprazole 40 mg capsule Commonly known as:  NexIUM Take 1 Cap by mouth daily. fluticasone 50 mcg/actuation nasal spray Commonly known as:  William South 2 Sprays by Both Nostrils route every evening. furosemide 40 mg tablet Commonly known as:  LASIX Take 1 Tab by mouth daily. glucose blood VI test strips strip Commonly known as:  FREESTYLE LITE STRIPS Test daily  
  
 guaiFENesin  mg ER tablet Commonly known as:  Shabbir & Shabbir Take 1 Tab by mouth two (2) times a day. Iron BisGl &PS Mhj-F-S48-FA-Ca 140-23-95-8 mg-mg-mcg-mg Cap Commonly known as:  Luis Miguel  Take 2 Caps by mouth daily. levoFLOXacin 500 mg tablet Commonly known as:  Saibno Brazil Take 1 Tab by mouth daily. methylPREDNISolone 4 mg tablet Commonly known as:  Brand Saint Start on Thursday  
  
 pioglitazone-metFORMIN  mg per tablet Commonly known as:  actoplus met Take 1 Tab by mouth two (2) times daily (with meals). PROBIOTIC 4X 10-15 mg Tbec Generic drug:  B.infantis-B.ani-B.long-B.bifi Take  by mouth. spironolactone 25 mg tablet Commonly known as:  ALDACTONE Take 1 Tab by mouth two (2) times a day. telmisartan 80 mg tablet Commonly known as:  Red Jointer Take 1 Tab by mouth daily. travoprost 0.004 % ophthalmic solution Commonly known as:  TRAVATAN Z Administer 1 Drop to both eyes every evening. Prescriptions Printed Refills  
 chlorpheniramine-HYDROcodone (TUSSIONEX) 10-8 mg/5 mL suspension 0 Sig: Take 5 mL by mouth every twelve (12) hours as needed for Cough. Max Daily Amount: 10 mL. Class: Print Route: Oral  
  
Prescriptions Sent to Pharmacy Refills  
 levoFLOXacin (LEVAQUIN) 500 mg tablet 0 Sig: Take 1 Tab by mouth daily. Class: Normal  
 Pharmacy: [address] #: [phone] Route: Oral  
 guaiFENesin ER (MUCINEX) 600 mg ER tablet 0 Sig: Take 1 Tab by mouth two (2) times a day. Class: Normal  
 Pharmacy: [address] #: [phone] Route: Oral  
  
Follow-up Instructions Return in about 1 week (around 5/12/2017). To-Do List   
 05/05/2017 Imaging:  XR CHEST PA LAT Introducing [facility]! Peg Hemp introduces [employer] patient portal. Now you can access parts of your medical record, email your doctor's office, and request medication refills online. 1. In your internet browser, go to [email or web address] 2. Click on the First Time User? Click Here link in the Sign In box. You will see the New Member Sign Up page. 3. Enter your [employer] Access Code exactly as it appears below. You will not need to use this code after youve completed the sign-up process. If you do not sign up before the expiration date, you must request a new code. · [employer] Access Code: [email or web address] Expires: 5/29/2017 12:39 PM 
 
4. Enter the last four digits of your Social Security Number (xxxx) and Date of Birth (mm/dd/yyyy) as indicated and click Submit. You will be taken to the next sign-up page. 5. Create a [employer] ID.  This will be your [employer] login ID and cannot be changed, so think of one that is secure and easy to remember. 6. Create a [employer] password. You can change your password at any time. 7. Enter your Password Reset Question and Answer. This can be used at a later time if you forget your password. 8. Enter your e-mail address. You will receive e-mail notification when new information is available in [address] 9. Click Sign Up. You can now view and download portions of your medical record. 10. Click the Download Summary menu link to download a portable copy of your medical information. If you have questions, please visit the Frequently Asked Questions section of the [employer] website. Remember, [employer] is NOT to be used for urgent needs. For medical emergencies, dial 911. Now available from your iPhone and Android! Please provide this summary of care documentation to your next provider. Your primary care clinician is listed as Sophia Gandhi. If you have any questions after today's visit, please call [phone].

## 2017-05-12 ENCOUNTER — HOSPITAL ENCOUNTER (OUTPATIENT)
Dept: LAB | Age: 77
Discharge: HOME | End: 2017-05-12
Payer: MEDICARE

## 2017-05-12 ENCOUNTER — OFFICE VISIT (OUTPATIENT)
Dept: FAMILY MEDICINE CLINIC | Age: 77
End: 2017-05-12

## 2017-05-12 VITALS
HEART RATE: 65 BPM | BODY MASS INDEX: 35.87 KG/M2 | DIASTOLIC BLOOD PRESSURE: 59 MMHG | WEIGHT: 190 LBS | HEIGHT: 61 IN | SYSTOLIC BLOOD PRESSURE: 141 MMHG | TEMPERATURE: 96.4 F | RESPIRATION RATE: 18 BRPM

## 2017-05-12 DIAGNOSIS — J45.909 ASTHMATIC BRONCHITIS, UNSPECIFIED ASTHMA SEVERITY, UNCOMPLICATED: Primary | ICD-10-CM

## 2017-05-12 DIAGNOSIS — I73.00 RAYNAUD'S DISEASE WITHOUT GANGRENE: ICD-10-CM

## 2017-05-12 LAB
ALBUMIN SERPL BCP-MCNC: 3.4 G/DL (ref 3.4–5)
ALBUMIN/GLOB SERPL: 1 {RATIO} (ref 0.8–1.7)
ALP SERPL-CCNC: 52 U/L (ref 45–117)
ALT SERPL-CCNC: 16 U/L (ref 13–56)
ANION GAP BLD CALC-SCNC: 7 MMOL/L (ref 3–18)
AST SERPL W P-5'-P-CCNC: 15 U/L (ref 15–37)
BASOPHILS # BLD AUTO: 0 K/UL (ref 0–0.06)
BASOPHILS # BLD: 0 % (ref 0–2)
BILIRUB SERPL-MCNC: 0.4 MG/DL (ref 0.2–1)
BUN SERPL-MCNC: 46 MG/DL (ref 7–18)
BUN/CREAT SERPL: 21 (ref 12–20)
CALCIUM SERPL-MCNC: 9.3 MG/DL (ref 8.5–10.1)
CHLORIDE SERPL-SCNC: 103 MMOL/L (ref 100–108)
CO2 SERPL-SCNC: 27 MMOL/L (ref 21–32)
CREAT SERPL-MCNC: 2.16 MG/DL (ref 0.6–1.3)
DIFFERENTIAL METHOD BLD: ABNORMAL
EOSINOPHIL # BLD: 0.1 K/UL (ref 0–0.4)
EOSINOPHIL NFR BLD: 1 % (ref 0–5)
ERYTHROCYTE [DISTWIDTH] IN BLOOD BY AUTOMATED COUNT: 15.7 % (ref 11.6–14.5)
GLOBULIN SER CALC-MCNC: 3.3 G/DL (ref 2–4)
GLUCOSE SERPL-MCNC: 92 MG/DL (ref 74–99)
HCT VFR BLD AUTO: 31.8 % (ref 35–45)
HGB BLD-MCNC: 10.5 G/DL (ref 12–16)
LYMPHOCYTES # BLD AUTO: 33 % (ref 21–52)
LYMPHOCYTES # BLD: 2.1 K/UL (ref 0.9–3.6)
MCH RBC QN AUTO: 30.3 PG (ref 24–34)
MCHC RBC AUTO-ENTMCNC: 33 G/DL (ref 31–37)
MCV RBC AUTO: 91.6 FL (ref 74–97)
MONOCYTES # BLD: 0.4 K/UL (ref 0.05–1.2)
MONOCYTES NFR BLD AUTO: 7 % (ref 3–10)
NEUTS SEG # BLD: 3.6 K/UL (ref 1.8–8)
NEUTS SEG NFR BLD AUTO: 59 % (ref 40–73)
PLATELET # BLD AUTO: 195 K/UL (ref 135–420)
PMV BLD AUTO: 10.2 FL (ref 9.2–11.8)
POTASSIUM SERPL-SCNC: 5.1 MMOL/L (ref 3.5–5.5)
PROT SERPL-MCNC: 6.7 G/DL (ref 6.4–8.2)
RBC # BLD AUTO: 3.47 M/UL (ref 4.2–5.3)
SODIUM SERPL-SCNC: 137 MMOL/L (ref 136–145)
WBC # BLD AUTO: 6.1 K/UL (ref 4.6–13.2)

## 2017-05-12 PROCEDURE — 85025 COMPLETE CBC W/AUTO DIFF WBC: CPT | Performed by: INTERNAL MEDICINE

## 2017-05-12 PROCEDURE — 80053 COMPREHEN METABOLIC PANEL: CPT | Performed by: INTERNAL MEDICINE

## 2017-05-12 PROCEDURE — 86038 ANTINUCLEAR ANTIBODIES: CPT | Performed by: INTERNAL MEDICINE

## 2017-05-12 PROCEDURE — 36415 COLL VENOUS BLD VENIPUNCTURE: CPT | Performed by: INTERNAL MEDICINE

## 2017-05-12 RX ORDER — CARBINOXAMINE MALEATE 4 MG/1
1 TABLET ORAL
Qty: 100 TAB | Refills: 2 | Status: SHIPPED | OUTPATIENT
Start: 2017-05-12 | End: 2017-07-25 | Stop reason: SDUPTHER

## 2017-05-12 RX ORDER — NIFEDIPINE 30 MG/1
30 TABLET, FILM COATED, EXTENDED RELEASE ORAL DAILY
Qty: 90 TAB | Refills: 1 | Status: SHIPPED | OUTPATIENT
Start: 2017-05-12 | End: 2019-01-08

## 2017-05-12 RX ORDER — ALBUTEROL SULFATE 90 UG/1
2 AEROSOL, METERED RESPIRATORY (INHALATION)
Qty: 1 INHALER | Refills: 3 | Status: SHIPPED | OUTPATIENT
Start: 2017-05-12 | End: 2018-03-20 | Stop reason: SDUPTHER

## 2017-05-12 NOTE — PROGRESS NOTES
HISTORY OF PRESENT ILLNESS  Amando Badillo is a 77 y.o. female. HPI  Asthmatic bronchitis much improved  C/o cold, numbness in hands    Review of Systems   Neurological: Positive for sensory change. All other systems reviewed and are negative. Past Medical History:   Diagnosis Date    [facility] ([facility]     colon    [facility]     Hypertension        Current Outpatient Prescriptions:     B.infantis-B.ani-B.long-B.bifi (PROBIOTIC 4X) 10-15 mg TbEC, Take  by mouth., Disp: , Rfl:     chlorpheniramine-HYDROcodone (TUSSIONEX) 10-8 mg/5 mL suspension, Take 5 mL by mouth every twelve (12) hours as needed for Cough. Max Daily Amount: 10 mL., Disp: 120 mL, Rfl: 0    pioglitazone-metFORMIN (ACTOPLUS MET)  mg per tablet, Take 1 Tab by mouth two (2) times daily (with meals). , Disp: 180 Tab, Rfl: 3    spironolactone (ALDACTONE) 25 mg tablet, Take 1 Tab by mouth two (2) times a day., Disp: 180 Tab, Rfl: 3    esomeprazole (NEXIUM) 40 mg capsule, Take 1 Cap by mouth daily. , Disp: 90 Cap, Rfl: 3    furosemide (LASIX) 40 mg tablet, Take 1 Tab by mouth daily. , Disp: 90 Tab, Rfl: 3    telmisartan (MICARDIS) 80 mg tablet, Take 1 Tab by mouth daily. , Disp: 90 Tab, Rfl: 3    fluticasone (FLONASE) 50 mcg/actuation nasal spray, 2 Sprays by Both Nostrils route every evening., Disp: 3 Bottle, Rfl: 3    Brompheniramine-Pseudoeph-DM (BROMFED DM) 2-30-10 mg/5 mL syrup, Take 5 mL by mouth four (4) times daily as needed. , Disp: 120 mL, Rfl: 1    Iron BisGl &PS Zzb-M-V57-FA-Ca (Alannah Sparrow) 896-23-72-1 mg-mg-mcg-mg cap, Take 2 Caps by mouth daily. , Disp: 180 Cap, Rfl: 1    diclofenac EC (VOLTAREN) 75 mg EC tablet, Take 1 Tab by mouth once over twenty-four (24) hours. , Disp: 30 Tab, Rfl: 1    colesevelam (WELCHOL) 625 mg tablet, Take 2 Tabs by mouth two (2) times daily (with meals). , Disp: 360 Tab, Rfl: 1    glucose blood VI test strips (FREESTYLE LITE STRIPS) strip, Test daily, Disp: 100 strip, Rfl: 3    travoprost (TRAVATAN Z) 0.004 % ophthalmic solution, Administer 1 Drop to both eyes every evening., Disp: 5 mL, Rfl: 3    levoFLOXacin (LEVAQUIN) 500 mg tablet, Take 1 Tab by mouth daily. , Disp: 7 Tab, Rfl: 0    guaiFENesin ER (MUCINEX) 600 mg ER tablet, Take 1 Tab by mouth two (2) times a day., Disp: 20 Tab, Rfl: 0    methylPREDNISolone (MEDROL DOSEPACK) 4 mg tablet, Start on Thursday, Disp: 1 Dose Pack, Rfl: 0    aspirin 81 mg tablet, Take 1 Tab by mouth daily. With a meal, Disp: 90 Tab, Rfl: 0  Visit Vitals    /59 (BP 1 Location: Right arm, BP Patient Position: Sitting)    Pulse 65    Temp 96.4 °F (35.8 °C) (Oral)    Resp 18    Ht 5' 1\" (1.549 m)    Wt 190 lb (86.2 kg)    BMI 35.9 kg/m2         Physical Exam   Constitutional: She appears well-developed and well-nourished. No distress. Pulmonary/Chest: Breath sounds normal. No respiratory distress. She has no wheezes. She has no rales. She exhibits no tenderness. Musculoskeletal: Normal range of motion. She exhibits no edema, tenderness or deformity. Skin: Skin is warm and dry. No rash noted. She is not diaphoretic. No erythema. No pallor. Marked Raynaud's both hands   Vitals reviewed.       ASSESSMENT and PLAN  bronchitis   Raynaud's  Plan  Labs  Trial of nifedipine 30 mg

## 2017-05-12 NOTE — PROGRESS NOTES
Chief Complaint   Patient presents with    Cough     Pain scale 0/10      1. Have you been to the ER, urgent care clinic since your last visit? Hospitalized since your last visit? No    2. Have you seen or consulted any other health care providers outside of the [address] [place] [address] since your last visit? Include any pap smears or colon screening.  Yes Where: Dr. Delia Mtz

## 2017-05-13 LAB
ANA SER QL: POSITIVE
DSDNA AB SER-ACNC: <1 IU/ML (ref 0–9)
ENA RNP AB SER-ACNC: 0.3 AI (ref 0–0.9)
ENA SM AB SER-ACNC: <0.2 AI (ref 0–0.9)

## 2017-07-25 RX ORDER — CARBINOXAMINE MALEATE 4 MG/1
1 TABLET ORAL
Qty: 100 TAB | Refills: 2 | Status: SHIPPED | OUTPATIENT
Start: 2017-07-25 | End: 2018-01-24 | Stop reason: SDUPTHER

## 2017-07-25 RX ORDER — FUROSEMIDE 40 MG/1
40 TABLET ORAL DAILY
Qty: 90 TAB | Refills: 3 | Status: SHIPPED | OUTPATIENT
Start: 2017-07-25 | End: 2017-10-19 | Stop reason: SDUPTHER

## 2017-07-25 RX ORDER — PIOGLITAZONE HCL AND METFORMIN HCL 500; 15 MG/1; MG/1
1 TABLET ORAL 2 TIMES DAILY WITH MEALS
Qty: 180 TAB | Refills: 3 | Status: SHIPPED | OUTPATIENT
Start: 2017-07-25 | End: 2017-10-19 | Stop reason: SDUPTHER

## 2017-07-25 RX ORDER — SPIRONOLACTONE 25 MG/1
25 TABLET ORAL 2 TIMES DAILY
Qty: 180 TAB | Refills: 3 | Status: SHIPPED | OUTPATIENT
Start: 2017-07-25 | End: 2017-10-19 | Stop reason: SDUPTHER

## 2017-07-25 RX ORDER — ESOMEPRAZOLE MAGNESIUM 40 MG/1
40 CAPSULE, DELAYED RELEASE ORAL DAILY
Qty: 90 CAP | Refills: 3 | Status: SHIPPED | OUTPATIENT
Start: 2017-07-25 | End: 2017-08-02 | Stop reason: SDUPTHER

## 2017-08-02 ENCOUNTER — OFFICE VISIT (OUTPATIENT)
Dept: FAMILY MEDICINE CLINIC | Age: 77
End: 2017-08-02

## 2017-08-02 VITALS
TEMPERATURE: 98.2 F | RESPIRATION RATE: 18 BRPM | SYSTOLIC BLOOD PRESSURE: 136 MMHG | WEIGHT: 194.4 LBS | HEART RATE: 66 BPM | DIASTOLIC BLOOD PRESSURE: 62 MMHG | BODY MASS INDEX: 36.7 KG/M2 | OXYGEN SATURATION: 100 % | HEIGHT: 61 IN

## 2017-08-02 DIAGNOSIS — J45.31 ASTHMATIC BRONCHITIS, MILD PERSISTENT, WITH ACUTE EXACERBATION: Primary | ICD-10-CM

## 2017-08-02 RX ORDER — HYDROCODONE POLISTIREX AND CHLORPHENIRAMINE POLISTIREX 10; 8 MG/5ML; MG/5ML
5 SUSPENSION, EXTENDED RELEASE ORAL
Qty: 120 ML | Refills: 0 | Status: SHIPPED | OUTPATIENT
Start: 2017-08-02 | End: 2018-03-20 | Stop reason: ALTCHOICE

## 2017-08-02 RX ORDER — AZITHROMYCIN 500 MG/1
500 TABLET, FILM COATED ORAL DAILY
Qty: 6 TAB | Refills: 0 | Status: SHIPPED | OUTPATIENT
Start: 2017-08-02 | End: 2017-08-12

## 2017-08-02 RX ORDER — LEVOFLOXACIN 500 MG/1
500 TABLET, FILM COATED ORAL DAILY
Qty: 10 TAB | Refills: 0 | Status: SHIPPED | OUTPATIENT
Start: 2017-08-02 | End: 2017-08-02 | Stop reason: CLARIF

## 2017-08-02 RX ORDER — ESOMEPRAZOLE MAGNESIUM 40 MG/1
40 CAPSULE, DELAYED RELEASE ORAL DAILY
Qty: 30 CAP | Refills: 6 | Status: SHIPPED | OUTPATIENT
Start: 2017-08-02 | End: 2018-03-20 | Stop reason: SDUPTHER

## 2017-08-02 NOTE — PROGRESS NOTES
Georgia Shultz is a 77 y.o. female who presents today for annual wellness exam. Patient c/o cough and sinus pressure behind eyes. Pain scale 0/10    Health Maintenance Due   Topic Date Due    HEMOGLOBIN A1C Q6M  07/20/2017    INFLUENZA AGE 9 TO ADULT  08/01/2017    MICROALBUMIN Q1  08/23/2017    LIPID PANEL Q1  08/23/2017           1. Have you been to the ER, urgent care clinic since your last visit? Hospitalized since your last visit? No    2. Have you seen or consulted any other health care providers outside of the [address] [place] [address] since your last visit? Include any pap smears or colon screening. No    Learning Assessment 4/28/2015   PRIMARY LEARNER Patient   HIGHEST LEVEL OF EDUCATION - PRIMARY LEARNER  DID NOT GRADUATE HIGH SCHOOL   BARRIERS PRIMARY LEARNER NONE   CO-LEARNER CAREGIVER No   PRIMARY LANGUAGE ENGLISH    NEED No   LEARNER PREFERENCE PRIMARY DEMONSTRATION   ANSWERED BY Patient   RELATIONSHIP SELF       Abuse Screening Questionnaire 5/13/2014   Do you ever feel afraid of your partner? N   Are you in a relationship with someone who physically or mentally threatens you? N   Is it safe for you to go home?  Keshav Mas

## 2017-08-02 NOTE — MR AVS SNAPSHOT
Visit Information Date & Time Provider Department Dept. Phone Encounter #  
 8/2/2017 11:15 AM Belén Arreola, 3 [facility] [phone] [id number] Follow-up Instructions Return for MWV 30 minutes. Follow-up and Disposition History Upcoming Health Maintenance Date Due HEMOGLOBIN A1C Q6M 7/20/2017 INFLUENZA AGE 9 TO ADULT 8/1/2017 MICROALBUMIN Q1 8/23/2017 LIPID PANEL Q1 8/23/2017 MEDICARE YEARLY EXAM 8/6/2017 FOOT EXAM Q1 5/5/2018 EYE EXAM RETINAL OR DILATED Q1 5/12/2018 GLAUCOMA SCREENING Q2Y 5/12/2019 DTaP/Tdap/Td series (2 - Td) 8/17/2025 Allergies as of 8/2/2017  Review Complete On: 8/2/2017 By: Belén Arreola MD  
  
 Severity Noted Reaction Type Reactions Adhesive  10/05/2016    Other (comments) Skin tears Amoxicillin  09/28/2010    Hives Current Immunizations  Reviewed on 8/17/2015 Name Date Influenza Vaccine 10/24/2013  2:45 PM  
 Tdap 8/17/2015 11:51 AM  
  
 Not reviewed this visit You Were Diagnosed With   
  
 Codes Comments Asthmatic bronchitis, mild persistent, with acute exacerbation    -  Primary ICD-10-CM: J45.31 
ICD-9-CM: 493.92 Vitals BP Pulse Temp Resp Height(growth percentile) Weight(growth percentile) 136/62 (BP 1 Location: Right arm, BP Patient Position: Sitting) 66 98.2 °F (36.8 °C) (Oral) 18 5' 1\" (1.549 m) 194 lb 6.4 oz (88.2 kg) SpO2 BMI OB Status Smoking Status 100% 36.73 kg/m2 Postmenopausal Never Smoker Vitals History BMI and BSA Data Body Mass Index Body Surface Area  
 36.73 kg/m 2 1.95 m 2 Preferred Pharmacy Pharmacy Name Phone [id number] [address] [phone] Your Updated Medication List  
  
   
This list is accurate as of: 8/2/17 11:45 AM.  Always use your most recent med list.  
  
  
  
  
 albuterol 90 mcg/actuation inhaler Commonly known as:  PROVENTIL HFA, VENTOLIN HFA, PROAIR HFA Take 2 Puffs by inhalation every six (6) hours as needed for Wheezing. azithromycin 500 mg Tab Commonly known as:  Mahbjorn Monica Take 1 Tab by mouth daily for 10 days. Brompheniramine-Pseudoeph-DM 2-30-10 mg/5 mL syrup Commonly known as:  BROMFED DM Take 5 mL by mouth four (4) times daily as needed. carbinoxamine maleate 4 mg Tab Commonly known as:  Gamle [address] Take 1 Tab by mouth three (3) times daily as needed (congestion). chlorpheniramine-HYDROcodone 10-8 mg/5 mL suspension Commonly known as:  Nnamdi Sicard Take 5 mL by mouth every twelve (12) hours as needed for Cough. Max Daily Amount: 10 mL. colesevelam 625 mg tablet Commonly known as:  [facility] Take 2 Tabs by mouth two (2) times daily (with meals). diclofenac EC 75 mg EC tablet Commonly known as:  VOLTAREN Take 1 Tab by mouth once over twenty-four (24) hours. esomeprazole 40 mg capsule Commonly known as:  NexIUM Take 1 Cap by mouth daily. fluticasone 50 mcg/actuation nasal spray Commonly known as:  Francella Lacks 2 Sprays by Both Nostrils route every evening. furosemide 40 mg tablet Commonly known as:  LASIX Take 1 Tab by mouth daily. glucose blood VI test strips strip Commonly known as:  FREESTYLE LITE STRIPS Test daily  
  
 guaiFENesin  mg ER tablet Commonly known as:  [employer] Take 1 Tab by mouth two (2) times a day. Iron BisGl &PS [id number] 106-95-98-4 mg-mg-mcg-mg Cap Commonly known as:  Patience [employer] Take 2 Caps by mouth daily. levoFLOXacin 500 mg tablet Commonly known as:  Loistine Yonathan Take 1 Tab by mouth daily. methylPREDNISolone 4 mg tablet Commonly known as:  Pollie Macario Start on Thursday NIFEdipine ER 30 mg ER tablet Commonly known as:  ADALAT CC Take 1 Tab by mouth daily. pioglitazone-metFORMIN  mg per tablet Commonly known as:  actoplus met Take 1 Tab by mouth two (2) times daily (with meals). PROBIOTIC 4X 10-15 mg Tbec Generic drug:  B.infantis-B.ani-B.long-B.bifi Take  by mouth. spironolactone 25 mg tablet Commonly known as:  ALDACTONE Take 1 Tab by mouth two (2) times a day. telmisartan 80 mg tablet Commonly known as:  Sahra Capuchin Take 1 Tab by mouth daily. travoprost 0.004 % ophthalmic solution Commonly known as:  TRAVATAN Z Administer 1 Drop to both eyes every evening. Prescriptions Printed Refills  
 chlorpheniramine-HYDROcodone (TUSSIONEX) 10-8 mg/5 mL suspension 0 Sig: Take 5 mL by mouth every twelve (12) hours as needed for Cough. Max Daily Amount: 10 mL. Class: Print Route: Oral  
  
Prescriptions Sent to Pharmacy Refills  
 azithromycin (ZITHROMAX) 500 mg tab 0 Sig: Take 1 Tab by mouth daily for 10 days. Class: Normal  
 [facility]: [facility] [address] [place] [address]  #: [phone] Route: Oral  
  
Follow-up Instructions Return for MWV 30 minutes. Introducing hospitals & HEALTH SERVICES! Norm Zaman introduces [employer] patient portal. Now you can access parts of your medical record, email your doctor's office, and request medication refills online. 1. In your internet browser, go to [email or web address] 2. Click on the First Time User? Click Here link in the Sign In box. You will see the New Member Sign Up page. 3. Enter your [employer] Access Code exactly as it appears below. You will not need to use this code after youve completed the sign-up process. If you do not sign up before the expiration date, you must request a new code. · [employer] Access Code: [email or web address] Expires: 10/31/2017 10:50 AM 
 
4. Enter the last four digits of your Social Security Number (xxxx) and Date of Birth (mm/dd/yyyy) as indicated and click Submit.  You will be taken to the next sign-up page. 5. Create a [employer] ID. This will be your [employer] login ID and cannot be changed, so think of one that is secure and easy to remember. 6. Create a [employer] password. You can change your password at any time. 7. Enter your Password Reset Question and Answer. This can be used at a later time if you forget your password. 8. Enter your e-mail address. You will receive e-mail notification when new information is available in [address] 9. Click Sign Up. You can now view and download portions of your medical record. 10. Click the Download Summary menu link to download a portable copy of your medical information. If you have questions, please visit the Frequently Asked Questions section of the [employer] website. Remember, [employer] is NOT to be used for urgent needs. For medical emergencies, dial 911. Now available from your iPhone and Android! Please provide this summary of care documentation to your next provider. Your primary care clinician is listed as Aggie Christians. If you have any questions after today's visit, please call [phone].

## 2017-08-02 NOTE — TELEPHONE ENCOUNTER
Received notice from patients pharmacy, insurance no longer covering nexium. They want pt to take omeprazole, pantoprazole, or rabeprazole  Please advise.

## 2017-08-02 NOTE — PROGRESS NOTES
HISTORY OF PRESENT ILLNESS  Nimesh Johnson is a 77 y.o. female. HPI  Uri > cough, sputum    Review of Systems   HENT: Positive for congestion. Respiratory: Positive for cough and sputum production. All other systems reviewed and are negative. Past Medical History:   Diagnosis Date    [facility] ([facility] [address]     colon    Diabetes ([facility] [address]     Hypertension      Current Outpatient Prescriptions on File Prior to Visit   Medication Sig Dispense Refill    esomeprazole (NEXIUM) 40 mg capsule Take 1 Cap by mouth daily. 90 Cap 3    furosemide (LASIX) 40 mg tablet Take 1 Tab by mouth daily. 90 Tab 3    spironolactone (ALDACTONE) 25 mg tablet Take 1 Tab by mouth two (2) times a day. 180 Tab 3    pioglitazone-metFORMIN (ACTOPLUS MET)  mg per tablet Take 1 Tab by mouth two (2) times daily (with meals). 180 Tab 3    carbinoxamine maleate (PALGIC) 4 mg tab Take 1 Tab by mouth three (3) times daily as needed (congestion). 100 Tab 2    albuterol (PROVENTIL HFA, VENTOLIN HFA, PROAIR HFA) 90 mcg/actuation inhaler Take 2 Puffs by inhalation every six (6) hours as needed for Wheezing. 1 Inhaler 3    B.infantis-B.ani-B.long-B.bifi (PROBIOTIC 4X) 10-15 mg TbEC Take  by mouth.  levoFLOXacin (LEVAQUIN) 500 mg tablet Take 1 Tab by mouth daily. 7 Tab 0    guaiFENesin ER (MUCINEX) 600 mg ER tablet Take 1 Tab by mouth two (2) times a day. 20 Tab 0    telmisartan (MICARDIS) 80 mg tablet Take 1 Tab by mouth daily. 90 Tab 3    fluticasone (FLONASE) 50 mcg/actuation nasal spray 2 Sprays by Both Nostrils route every evening. 3 Bottle 3    Brompheniramine-Pseudoeph-DM (BROMFED DM) 2-30-10 mg/5 mL syrup Take 5 mL by mouth four (4) times daily as needed. 120 mL 1    Iron BisGl &PS Sse-C-A43-FA-Ca (NANCY FORTE) 835-90-58-7 mg-mg-mcg-mg cap Take 2 Caps by mouth daily. 180 Cap 1    diclofenac EC (VOLTAREN) 75 mg EC tablet Take 1 Tab by mouth once over twenty-four (24) hours.  30 Tab 1    colesevelam (WELCHOL) 625 mg tablet Take 2 Tabs by mouth two (2) times daily (with meals). 360 Tab 1    glucose blood VI test strips (FREESTYLE LITE STRIPS) strip Test daily 100 strip 3    travoprost (TRAVATAN Z) 0.004 % ophthalmic solution Administer 1 Drop to both eyes every evening. 5 mL 3    NIFEdipine ER (ADALAT CC) 30 mg ER tablet Take 1 Tab by mouth daily. 90 Tab 1    methylPREDNISolone (MEDROL DOSEPACK) 4 mg tablet Start on Thursday 1 Dose Pack 0     No current facility-administered medications on file prior to visit. Visit Vitals    /62 (BP 1 Location: Right arm, BP Patient Position: Sitting)    Pulse 66    Temp 98.2 °F (36.8 °C) (Oral)    Resp 18    Ht 5' 1\" (1.549 m)    Wt 194 lb 6.4 oz (88.2 kg)    SpO2 100%    BMI 36.73 kg/m2         Physical Exam   Constitutional: She appears well-developed and well-nourished. No distress. HENT:   Head: Normocephalic and atraumatic. Right Ear: External ear normal.   Nose: Nose normal.   Mouth/Throat: Oropharynx is clear and moist. No oropharyngeal exudate. Pulmonary/Chest: Effort normal. No respiratory distress. She has wheezes. She has no rales. She exhibits no tenderness. Skin: She is not diaphoretic. Vitals reviewed.       ASSESSMENT and PLAN  asthmatic bronchitis   Plan  antibx  tussionex  mucinex  Albuterol hfa

## 2017-08-03 RX ORDER — PANTOPRAZOLE SODIUM 40 MG/1
40 TABLET, DELAYED RELEASE ORAL DAILY
Qty: 90 TAB | Refills: 3 | Status: SHIPPED | OUTPATIENT
Start: 2017-08-03 | End: 2018-01-24 | Stop reason: SDUPTHER

## 2017-08-30 RX ORDER — TELMISARTAN 80 MG/1
80 TABLET ORAL DAILY
Qty: 90 TAB | Refills: 3 | Status: SHIPPED | OUTPATIENT
Start: 2017-08-30 | End: 2018-03-20 | Stop reason: SDUPTHER

## 2017-08-30 NOTE — TELEPHONE ENCOUNTER
Requested Prescriptions     Pending Prescriptions Disp Refills    telmisartan (MICARDIS) 80 mg tablet 90 Tab 3     Sig: Take 1 Tab by mouth daily.

## 2017-09-07 ENCOUNTER — TELEPHONE (OUTPATIENT)
Dept: FAMILY MEDICINE CLINIC | Age: 77
End: 2017-09-07

## 2017-09-07 DIAGNOSIS — Z12.31 ENCOUNTER FOR SCREENING MAMMOGRAM FOR HIGH-RISK PATIENT: Primary | ICD-10-CM

## 2017-09-07 NOTE — TELEPHONE ENCOUNTER
Pt came in office to request an order for her annual mammogram. Pt prefers to have mammo done at [facility]

## 2017-10-13 DIAGNOSIS — Z12.31 ENCOUNTER FOR SCREENING MAMMOGRAM FOR HIGH-RISK PATIENT: ICD-10-CM

## 2017-10-19 RX ORDER — FUROSEMIDE 40 MG/1
40 TABLET ORAL DAILY
Qty: 90 TAB | Refills: 3 | Status: SHIPPED | OUTPATIENT
Start: 2017-10-19 | End: 2018-01-24 | Stop reason: SDUPTHER

## 2017-10-19 RX ORDER — COLESEVELAM 180 1/1
1250 TABLET ORAL 2 TIMES DAILY WITH MEALS
Qty: 360 TAB | Refills: 1 | Status: SHIPPED | OUTPATIENT
Start: 2017-10-19 | End: 2018-03-20 | Stop reason: SDUPTHER

## 2017-10-19 RX ORDER — SPIRONOLACTONE 25 MG/1
25 TABLET ORAL 2 TIMES DAILY
Qty: 180 TAB | Refills: 3 | Status: SHIPPED | OUTPATIENT
Start: 2017-10-19 | End: 2018-01-24 | Stop reason: SDUPTHER

## 2017-10-19 RX ORDER — PIOGLITAZONE HCL AND METFORMIN HCL 500; 15 MG/1; MG/1
1 TABLET ORAL 2 TIMES DAILY WITH MEALS
Qty: 180 TAB | Refills: 3 | Status: SHIPPED | OUTPATIENT
Start: 2017-10-19 | End: 2018-01-24 | Stop reason: SDUPTHER

## 2018-01-24 RX ORDER — SPIRONOLACTONE 25 MG/1
25 TABLET ORAL 2 TIMES DAILY
Qty: 180 TAB | Refills: 3 | Status: SHIPPED | OUTPATIENT
Start: 2018-01-24 | End: 2018-03-20 | Stop reason: SDUPTHER

## 2018-01-24 RX ORDER — PIOGLITAZONE HCL AND METFORMIN HCL 500; 15 MG/1; MG/1
1 TABLET ORAL 2 TIMES DAILY WITH MEALS
Qty: 180 TAB | Refills: 3 | Status: SHIPPED | OUTPATIENT
Start: 2018-01-24 | End: 2018-03-20 | Stop reason: SDUPTHER

## 2018-01-24 RX ORDER — PANTOPRAZOLE SODIUM 40 MG/1
40 TABLET, DELAYED RELEASE ORAL DAILY
Qty: 90 TAB | Refills: 3 | Status: SHIPPED | OUTPATIENT
Start: 2018-01-24 | End: 2018-10-22 | Stop reason: SDUPTHER

## 2018-01-24 RX ORDER — FUROSEMIDE 40 MG/1
40 TABLET ORAL DAILY
Qty: 90 TAB | Refills: 3 | Status: SHIPPED | OUTPATIENT
Start: 2018-01-24 | End: 2018-03-20 | Stop reason: SDUPTHER

## 2018-01-24 RX ORDER — CARBINOXAMINE MALEATE 4 MG/1
1 TABLET ORAL
Qty: 100 TAB | Refills: 2 | Status: SHIPPED | OUTPATIENT
Start: 2018-01-24 | End: 2019-02-25

## 2018-03-20 ENCOUNTER — OFFICE VISIT (OUTPATIENT)
Dept: FAMILY MEDICINE CLINIC | Age: 78
End: 2018-03-20

## 2018-03-20 VITALS
DIASTOLIC BLOOD PRESSURE: 65 MMHG | OXYGEN SATURATION: 96 % | HEART RATE: 60 BPM | TEMPERATURE: 97 F | BODY MASS INDEX: 36.25 KG/M2 | WEIGHT: 192 LBS | SYSTOLIC BLOOD PRESSURE: 171 MMHG | HEIGHT: 61 IN | RESPIRATION RATE: 20 BRPM

## 2018-03-20 DIAGNOSIS — E11.9 TYPE 2 DIABETES MELLITUS WITHOUT COMPLICATION, WITHOUT LONG-TERM CURRENT USE OF INSULIN (HCC): Primary | Chronic | ICD-10-CM

## 2018-03-20 DIAGNOSIS — D50.9 IRON DEFICIENCY ANEMIA, UNSPECIFIED IRON DEFICIENCY ANEMIA TYPE: Chronic | ICD-10-CM

## 2018-03-20 DIAGNOSIS — E55.9 VITAMIN D DEFICIENCY: ICD-10-CM

## 2018-03-20 DIAGNOSIS — E78.5 HYPERLIPIDEMIA, UNSPECIFIED HYPERLIPIDEMIA TYPE: ICD-10-CM

## 2018-03-20 PROBLEM — E66.01 SEVERE OBESITY (BMI 35.0-39.9) WITH COMORBIDITY (HCC): Status: ACTIVE | Noted: 2018-03-20

## 2018-03-20 PROBLEM — E11.21 TYPE 2 DIABETES WITH NEPHROPATHY (HCC): Status: ACTIVE | Noted: 2018-03-20

## 2018-03-20 RX ORDER — LANCETS
EACH MISCELLANEOUS
Qty: 100 EACH | Refills: 3 | Status: SHIPPED | OUTPATIENT
Start: 2018-03-20 | End: 2018-09-07 | Stop reason: ALTCHOICE

## 2018-03-20 RX ORDER — FUROSEMIDE 40 MG/1
40 TABLET ORAL DAILY
Qty: 90 TAB | Refills: 3 | Status: SHIPPED | OUTPATIENT
Start: 2018-03-20 | End: 2018-07-18 | Stop reason: SDUPTHER

## 2018-03-20 RX ORDER — ALBUTEROL SULFATE 90 UG/1
2 AEROSOL, METERED RESPIRATORY (INHALATION)
Qty: 1 INHALER | Refills: 3 | Status: SHIPPED | OUTPATIENT
Start: 2018-03-20 | End: 2019-02-25 | Stop reason: SDUPTHER

## 2018-03-20 RX ORDER — TELMISARTAN 80 MG/1
80 TABLET ORAL DAILY
Qty: 30 TAB | Refills: 0 | Status: SHIPPED | OUTPATIENT
Start: 2018-03-20 | End: 2018-07-18 | Stop reason: SDUPTHER

## 2018-03-20 RX ORDER — INSULIN PUMP SYRINGE, 3 ML
EACH MISCELLANEOUS
Qty: 1 KIT | Refills: 0 | Status: SHIPPED | OUTPATIENT
Start: 2018-03-20 | End: 2019-02-20

## 2018-03-20 RX ORDER — TELMISARTAN 80 MG/1
80 TABLET ORAL DAILY
Qty: 90 TAB | Refills: 3 | Status: SHIPPED | OUTPATIENT
Start: 2018-03-20 | End: 2018-03-20 | Stop reason: SDUPTHER

## 2018-03-20 RX ORDER — PIOGLITAZONE HCL AND METFORMIN HCL 500; 15 MG/1; MG/1
1 TABLET ORAL 2 TIMES DAILY WITH MEALS
Qty: 180 TAB | Refills: 3 | Status: SHIPPED | OUTPATIENT
Start: 2018-03-20 | End: 2018-07-18 | Stop reason: SDUPTHER

## 2018-03-20 RX ORDER — COLESEVELAM 180 1/1
1250 TABLET ORAL 2 TIMES DAILY WITH MEALS
Qty: 360 TAB | Refills: 3 | Status: SHIPPED | OUTPATIENT
Start: 2018-03-20 | End: 2020-10-02 | Stop reason: DRUGHIGH

## 2018-03-20 RX ORDER — SPIRONOLACTONE 25 MG/1
25 TABLET ORAL 2 TIMES DAILY
Qty: 180 TAB | Refills: 3 | Status: SHIPPED | OUTPATIENT
Start: 2018-03-20 | End: 2018-07-18 | Stop reason: SDUPTHER

## 2018-03-20 RX ORDER — ESOMEPRAZOLE MAGNESIUM 40 MG/1
40 CAPSULE, DELAYED RELEASE ORAL DAILY
Qty: 90 CAP | Refills: 3 | Status: SHIPPED | OUTPATIENT
Start: 2018-03-20 | End: 2018-09-07 | Stop reason: ALTCHOICE

## 2018-03-20 NOTE — PROGRESS NOTES
HISTORY OF PRESENT ILLNESS  Irena Lafleur is a 69 y.o. female. HPI  aodm stable  ckd stable  hbp stable, high today, ? stressors  Review of Systems   All other systems reviewed and are negative. Past Medical History:   Diagnosis Date    Cancer ([facility] [address]     colon    Diabetes ([facility] [address]     Hypertension        Current Outpatient Prescriptions:     furosemide (LASIX) 40 mg tablet, Take 1 Tab by mouth daily. , Disp: 90 Tab, Rfl: 3    pioglitazone-metFORMIN (ACTOPLUS MET)  mg per tablet, Take 1 Tab by mouth two (2) times daily (with meals). , Disp: 180 Tab, Rfl: 3    pantoprazole (PROTONIX) 40 mg tablet, Take 1 Tab by mouth daily. , Disp: 90 Tab, Rfl: 3    carbinoxamine maleate (PALGIC) 4 mg tab, Take 1 Tab by mouth three (3) times daily as needed (congestion). , Disp: 100 Tab, Rfl: 2    spironolactone (ALDACTONE) 25 mg tablet, Take 1 Tab by mouth two (2) times a day., Disp: 180 Tab, Rfl: 3    colesevelam (WELCHOL) 625 mg tablet, Take 2 Tabs by mouth two (2) times daily (with meals). , Disp: 360 Tab, Rfl: 1    Iron BisGl &PS Fay-F-Y34-FA-Ca ([facility]) 232-45-55-3 mg-mg-mcg-mg cap, Take 2 Caps by mouth daily. , Disp: 180 Cap, Rfl: 1    telmisartan (MICARDIS) 80 mg tablet, Take 1 Tab by mouth daily. , Disp: 90 Tab, Rfl: 3    esomeprazole (NEXIUM) 40 mg capsule, Take 1 Cap by mouth daily. , Disp: 30 Cap, Rfl: 6    NIFEdipine ER (ADALAT CC) 30 mg ER tablet, Take 1 Tab by mouth daily. , Disp: 90 Tab, Rfl: 1    albuterol (PROVENTIL HFA, VENTOLIN HFA, PROAIR HFA) 90 mcg/actuation inhaler, Take 2 Puffs by inhalation every six (6) hours as needed for Wheezing., Disp: 1 Inhaler, Rfl: 3    B.infantis-B.ani-B.long-B.bifi (PROBIOTIC 4X) 10-15 mg TbEC, Take  by mouth., Disp: , Rfl:     fluticasone (FLONASE) 50 mcg/actuation nasal spray, 2 Sprays by Both Nostrils route every evening., Disp: 3 Bottle, Rfl: 3    diclofenac EC (VOLTAREN) 75 mg EC tablet, Take 1 Tab by mouth once over twenty-four (24) hours. , Disp: 30 Tab, Rfl: 1    glucose blood VI test strips (FREESTYLE LITE STRIPS) strip, Test daily, Disp: 100 strip, Rfl: 3    travoprost (TRAVATAN Z) 0.004 % ophthalmic solution, Administer 1 Drop to both eyes every evening., Disp: 5 mL, Rfl: 3  Visit Vitals    /65 (BP 1 Location: Right arm, BP Patient Position: Sitting)    Pulse 60    Temp 97 °F (36.1 °C) (Oral)    Resp 20    Ht 5' 1\" (1.549 m)    Wt 192 lb (87.1 kg)    SpO2 96%    BMI 36.28 kg/m2         Physical Exam   Constitutional: She appears well-developed and well-nourished. No distress. Neck: Normal range of motion. Neck supple. No thyromegaly present. Carotid upstroke normal  - bruits   Cardiovascular: Normal rate, regular rhythm, normal heart sounds and intact distal pulses. Exam reveals no gallop and no friction rub. No murmur heard. Lymphadenopathy:     She has no cervical adenopathy. Skin: Skin is warm and dry. No rash noted. She is not diaphoretic. No erythema. No pallor. Vitals reviewed.       ASSESSMENT and PLAN  aodm   hbp  ckd  Plan  Refills  Labs

## 2018-03-20 NOTE — PROGRESS NOTES
Concepción Powell is a 69 y.o. female (: 1940) presenting to address:    Chief Complaint   Patient presents with    Diabetes    Hypertension    Asthma     pain scale 0/10       Vitals:    18 1032   BP: 171/65   Pulse: 60   Resp: 20   Temp: 97 °F (36.1 °C)   TempSrc: Oral   SpO2: 96%   Weight: 192 lb (87.1 kg)   Height: 5' 1\" (1.549 m)   PainSc:   0 - No pain       Hearing/Vision:   No exam data present    Learning Assessment:     Learning Assessment 2015   PRIMARY LEARNER Patient   HIGHEST LEVEL OF EDUCATION - PRIMARY LEARNER  DID NOT GRADUATE HIGH SCHOOL   BARRIERS PRIMARY LEARNER NONE   CO-LEARNER CAREGIVER No   PRIMARY LANGUAGE ENGLISH    NEED No   LEARNER PREFERENCE PRIMARY DEMONSTRATION   ANSWERED BY Patient   RELATIONSHIP SELF     Depression Screening:     PHQ over the last two weeks 3/20/2018   Little interest or pleasure in doing things Not at all   Feeling down, depressed or hopeless Not at all   Total Score PHQ 2 0     Fall Risk Assessment:     Fall Risk Assessment, last 12 mths 3/20/2018   Able to walk? Yes   Fall in past 12 months? No   Fall with injury? -   Number of falls in past 12 months -   Fall Risk Score -     Abuse Screening:     Abuse Screening Questionnaire 2014   Do you ever feel afraid of your partner? N   Are you in a relationship with someone who physically or mentally threatens you? N   Is it safe for you to go home? Y     Coordination of Care Questionaire:   1. Have you been to the ER, urgent care clinic since your last visit? Hospitalized since your last visit? NO    2. Have you seen or consulted any other health care providers outside of the [address] [place] [address] since your last visit? Include any pap smears or colon screening. NO    Advanced Directive:   1. Do you have an Advanced Directive? NO    2. Would you like information on Advanced Directives?  YES

## 2018-03-20 NOTE — MR AVS SNAPSHOT
[address] [facility] [address] 
[phone] Patient: Alia Hess MRN: [id number] HUP:[id number] Visit Information Date & Time Provider Department Dept. Phone Encounter #  
 3/20/2018 11:15 AM Catherine Sampson, [facility] [phone] [id number] Follow-up Instructions Return in about 3 months (around 6/20/2018). Follow-up and Disposition History Upcoming Health Maintenance Date Due Influenza Age 5 to Adult 8/1/2017 MEDICARE YEARLY EXAM 3/14/2018 FOOT EXAM Q1 5/5/2018 HEMOGLOBIN A1C Q6M 9/20/2018 EYE EXAM RETINAL OR DILATED Q1 12/22/2018 MICROALBUMIN Q1 3/20/2019 LIPID PANEL Q1 3/20/2019 GLAUCOMA SCREENING Q2Y 12/22/2019 DTaP/Tdap/Td series (2 - Td) 8/17/2025 Allergies as of 3/20/2018  Review Complete On: 3/20/2018 By: Catherine Sampson MD  
  
 Severity Noted Reaction Type Reactions Adhesive  10/05/2016    Other (comments) Skin tears Amoxicillin  09/28/2010    Hives Current Immunizations  Reviewed on 8/17/2015 Name Date Influenza Vaccine 10/24/2013  2:45 PM  
 Tdap 8/17/2015 11:51 AM  
  
 Not reviewed this visit You Were Diagnosed With   
  
 Codes Comments Type 2 diabetes mellitus without complication, without long-term current use of insulin (HCC)    -  Primary ICD-10-CM: E11.9 ICD-9-CM: 250.00 Iron deficiency anemia, unspecified iron deficiency anemia type     ICD-10-CM: D50.9 ICD-9-CM: 280.9 Hyperlipidemia, unspecified hyperlipidemia type     ICD-10-CM: E78.5 ICD-9-CM: 272.4 Vitamin D deficiency     ICD-10-CM: E55.9 ICD-9-CM: 268.9 Vitals BP Pulse Temp Resp Height(growth percentile) Weight(growth percentile) 171/65 (BP 1 Location: Right arm, BP Patient Position: Sitting) 60 97 °F (36.1 °C) (Oral) 20 5' 1\" (1.549 m) 192 lb (87.1 kg) SpO2 BMI OB Status Smoking Status 96% 36.28 kg/m2 Postmenopausal Never Smoker Vitals History BMI and BSA Data Body Mass Index Body Surface Area  
 36.28 kg/m 2 1.94 m 2 Preferred Pharmacy Pharmacy Name Phone [facility] [phone] Your Updated Medication List  
  
   
This list is accurate as of 3/20/18 11:24 AM.  Always use your most recent med list.  
  
  
  
  
 albuterol 90 mcg/actuation inhaler Commonly known as:  PROVENTIL HFA, VENTOLIN HFA, PROAIR HFA Take 2 Puffs by inhalation every six (6) hours as needed for Wheezing. Blood-Glucose Meter monitoring kit Commonly known as:  [place] Sers Test daily  
  
 carbinoxamine maleate 4 mg Tab Commonly known as:  Luis Enrique Elizabeth 157 Take 1 Tab by mouth three (3) times daily as needed (congestion). colesevelam 625 mg tablet Commonly known as:  [facility] Take 2 Tabs by mouth two (2) times daily (with meals). diclofenac EC 75 mg EC tablet Commonly known as:  VOLTAREN Take 1 Tab by mouth once over twenty-four (24) hours. esomeprazole 40 mg capsule Commonly known as:  NexIUM Take 1 Cap by mouth daily. fluticasone 50 mcg/actuation nasal spray Commonly known as:  [facility] Pick[facility] 2 Sprays by Both Nostrils route every evening. furosemide 40 mg tablet Commonly known as:  LASIX Take 1 Tab by mouth daily. * glucose blood VI test strips strip Commonly known as:  FREESTYLE LITE STRIPS Test daily * glucose blood VI test strips strip Commonly known as:  ASCENSIA AUTODISC VI, ONE TOUCH ULTRA TEST VI Test daily Iron BisGl &PS [id number] 710-83-47-3 mg-mg-mcg-mg Cap Commonly known as:  Severo Lusty Take 2 Caps by mouth daily. Lancets Misc Test daily NIFEdipine ER 30 mg ER tablet Commonly known as:  ADALAT CC Take 1 Tab by mouth daily. pantoprazole 40 mg tablet Commonly known as:  PROTONIX Take 1 Tab by mouth daily. pioglitazone-metFORMIN  mg per tablet Commonly known as:  actoplus met Take 1 Tab by mouth two (2) times daily (with meals). PROBIOTIC 4X 10-15 mg Tbec Generic drug:  B.infantis-B.ani-B.long-B.bifi Take  by mouth. spironolactone 25 mg tablet Commonly known as:  ALDACTONE Take 1 Tab by mouth two (2) times a day. telmisartan 80 mg tablet Commonly known as:  Yareli Heys Take 1 Tab by mouth daily. travoprost 0.004 % ophthalmic solution Commonly known as:  TRAVATAN Z Administer 1 Drop to both eyes every evening. * Notice: This list has 2 medication(s) that are the same as other medications prescribed for you. Read the directions carefully, and ask your doctor or other care provider to review them with you. Prescriptions Sent to Pharmacy Refills  
 colesevelam (WELCHOL) 625 mg tablet 3 Sig: Take 2 Tabs by mouth two (2) times daily (with meals). Class: Normal  
 Pharmacy: [address], [address] Ph #: [phone] Route: Oral  
 esomeprazole (NEXIUM) 40 mg capsule 3 Sig: Take 1 Cap by mouth daily. Class: Normal  
 Pharmacy: [address], [address] Ph #: [phone] Route: Oral  
 Iron BisGl &PS [id number]-[id number] (Chantal Pinta) [id number] mg-mg-mcg-mg cap 3 Sig: Take 2 Caps by mouth daily. Class: Normal  
 Pharmacy: [address], [address] Ph #: [phone] Route: Oral  
 pioglitazone-metFORMIN (ACTOPLUS MET)  mg per tablet 3 Sig: Take 1 Tab by mouth two (2) times daily (with meals). Class: Normal  
 Pharmacy: [address], [address] Ph #: [phone] Route: Oral  
 spironolactone (ALDACTONE) 25 mg tablet 3 Sig: Take 1 Tab by mouth two (2) times a day.   
 Class: Normal  
 Pharmacy: [address], [address] Ph #: [phone] Route: Oral  
 furosemide (LASIX) 40 mg tablet 3 Sig: Take 1 Tab by mouth daily. Class: Normal  
 Pharmacy: [address], [address] Ph #: [phone] Route: Oral  
 telmisartan (MICARDIS) 80 mg tablet 3 Sig: Take 1 Tab by mouth daily. Class: Normal  
 Pharmacy: [address], [address] Ph #: [phone] Route: Oral  
 albuterol (PROVENTIL HFA, VENTOLIN HFA, PROAIR HFA) 90 mcg/actuation inhaler 3 Sig: Take 2 Puffs by inhalation every six (6) hours as needed for Wheezing. Class: Normal  
 Pharmacy: [address], [address] Ph #: [phone] Route: Inhalation Blood-Glucose Meter (ONETOUCH ULTRA2) monitoring kit 0 Sig: Test daily Class: Normal  
 Pharmacy: [address] Ph #: [phone]  
 glucose blood VI test strips (ASCENSIA AUTODISC VI, ONE TOUCH ULTRA TEST VI) strip 3 Sig: Test daily Class: Normal  
 Pharmacy: [address], [address] Ph #: [phone] Lancets misc 3 Sig: Test daily Class: Normal  
 Pharmacy: [address], [address] Ph #: [phone] Follow-up Instructions Return in about 3 months (around 6/20/2018). To-Do List   
 03/20/2018 Lab:  CBC WITH AUTOMATED DIFF   
  
 03/20/2018 Lab:  LIPID PANEL   
  
 03/20/2018 Lab:  METABOLIC PANEL, COMPREHENSIVE   
  
 03/20/2018 Lab:  MICROALBUMIN, UR, RAND W/ MICROALB/CREAT RATIO   
  
 03/20/2018 Lab:  T4, FREE   
  
 03/20/2018 Lab:  TSH 3RD GENERATION   
  
 03/20/2018 Lab:  VITAMIN D, 25 HYDROXY Patient Instructions Body Mass Index: Care Instructions Your Care Instructions Body mass index (BMI) can help you see if your weight is raising your risk for health problems. It uses a formula to compare how much you weigh with how tall you are. · A BMI lower than 18.5 is considered underweight. · A BMI between 18.5 and 24.9 is considered healthy. · A BMI between 25 and 29.9 is considered overweight. A BMI of 30 or higher is considered obese. If your BMI is in the normal range, it means that you have a lower risk for weight-related health problems. If your BMI is in the overweight or obese range, you may be at increased risk for weight-related health problems, such as high blood pressure, heart disease, stroke, arthritis or joint pain, and diabetes. If your BMI is in the underweight range, you may be at increased risk for health problems such as fatigue, lower protection (immunity) against illness, muscle loss, bone loss, hair loss, and hormone problems. BMI is just one measure of your risk for weight-related health problems. You may be at higher risk for health problems if you are not active, you eat an unhealthy diet, or you drink too much alcohol or use tobacco products. Follow-up care is a key part of your treatment and safety. Be sure to make and go to all appointments, and call your doctor if you are having problems. It's also a good idea to know your test results and keep a list of the medicines you take. How can you care for yourself at home? · Practice healthy eating habits. This includes eating plenty of fruits, vegetables, whole grains, lean protein, and low-fat dairy. · If your doctor recommends it, get more exercise. Walking is a good choice. Bit by bit, increase the amount you walk every day. Try for at least 30 minutes on most days of the week. · Do not smoke. Smoking can increase your risk for health problems.  If you need help quitting, talk to your doctor about stop-smoking programs and medicines. These can increase your chances of quitting for good. · Limit alcohol to 2 drinks a day for men and 1 drink a day for women. Too much alcohol can cause health problems. If you have a BMI higher than 25 · Your doctor may do other tests to check your risk for weight-related health problems. This may include measuring the distance around your waist. A waist measurement of more than 40 inches in men or 35 inches in women can increase the risk of weight-related health problems. · Talk with your doctor about steps you can take to stay healthy or improve your health. You may need to make lifestyle changes to lose weight and stay healthy, such as changing your diet and getting regular exercise. If you have a BMI lower than 18.5 · Your doctor may do other tests to check your risk for health problems. · Talk with your doctor about steps you can take to stay healthy or improve your health. You may need to make lifestyle changes to gain or maintain weight and stay healthy, such as getting more healthy foods in your diet and doing exercises to build muscle. Where can you learn more? Go to http://liliam-adrianna.info/. Enter S176 in the search box to learn more about \"Body Mass Index: Care Instructions. \" Current as of: October 13, 2016 Content Version: 11.4 © 3584-1640 Healthwise, Incorporated. Care instructions adapted under license by [facility] (which disclaims liability or warranty for this information). If you have questions about a medical condition or this instruction, always ask your healthcare professional. [address] any warranty or liability for your use of this information. Introducing [facility]! Olivia Black introduces [employer] patient portal. Now you can access parts of your medical record, email your doctor's office, and request medication refills online.    
 
1. In your internet browser, go to [email or web address] 2. Click on the First Time User? Click Here link in the Sign In box. You will see the New Member Sign Up page. 3. Enter your [employer] Access Code exactly as it appears below. You will not need to use this code after youve completed the sign-up process. If you do not sign up before the expiration date, you must request a new code. · [employer] Access Code: [facility] YVONNE Expires: 6/18/2018 11:24 AM 
 
4. Enter the last four digits of your Social Security Number (xxxx) and Date of Birth (mm/dd/yyyy) as indicated and click Submit. You will be taken to the next sign-up page. 5. Create a [employer] ID. This will be your [employer] login ID and cannot be changed, so think of one that is secure and easy to remember. 6. Create a [employer] password. You can change your password at any time. 7. Enter your Password Reset Question and Answer. This can be used at a later time if you forget your password. 8. Enter your e-mail address. You will receive e-mail notification when new information is available in [address] 9. Click Sign Up. You can now view and download portions of your medical record. 10. Click the Download Summary menu link to download a portable copy of your medical information. If you have questions, please visit the Frequently Asked Questions section of the [employer] website. Remember, [employer] is NOT to be used for urgent needs. For medical emergencies, dial 911. Now available from your iPhone and Android! Please provide this summary of care documentation to your next provider. Your primary care clinician is listed as Riddhi Titus. If you have any questions after today's visit, please call [phone].

## 2018-07-18 RX ORDER — SPIRONOLACTONE 25 MG/1
25 TABLET ORAL 2 TIMES DAILY
Qty: 180 TAB | Refills: 3 | Status: SHIPPED | OUTPATIENT
Start: 2018-07-18 | End: 2018-12-26 | Stop reason: SDUPTHER

## 2018-07-18 RX ORDER — FUROSEMIDE 40 MG/1
40 TABLET ORAL DAILY
Qty: 90 TAB | Refills: 3 | Status: SHIPPED | OUTPATIENT
Start: 2018-07-18 | End: 2018-12-26 | Stop reason: SDUPTHER

## 2018-07-18 RX ORDER — PIOGLITAZONE HCL AND METFORMIN HCL 500; 15 MG/1; MG/1
1 TABLET ORAL 2 TIMES DAILY WITH MEALS
Qty: 180 TAB | Refills: 3 | Status: SHIPPED | OUTPATIENT
Start: 2018-07-18 | End: 2018-10-22 | Stop reason: SDUPTHER

## 2018-07-18 RX ORDER — TELMISARTAN 80 MG/1
80 TABLET ORAL DAILY
Qty: 30 TAB | Refills: 0 | Status: SHIPPED | OUTPATIENT
Start: 2018-07-18 | End: 2018-08-28 | Stop reason: SDUPTHER

## 2018-08-28 ENCOUNTER — TELEPHONE (OUTPATIENT)
Dept: FAMILY MEDICINE CLINIC | Age: 78
End: 2018-08-28

## 2018-08-28 RX ORDER — TELMISARTAN 80 MG/1
80 TABLET ORAL DAILY
Qty: 30 TAB | Refills: 0 | Status: SHIPPED | OUTPATIENT
Start: 2018-08-28 | End: 2018-09-28 | Stop reason: SDUPTHER

## 2018-08-28 NOTE — TELEPHONE ENCOUNTER
Pt is in need of prescription refill of telmisartan (MICARDIS) 80 mg tablet. Pt was a former pt of Dr. Natali Ramos and just got the letter regarding his leaving recently due to being out of town. Pt has made a JESSICA appt with Miguelangel Gallegos and only has one pill left and would like to know if she would be able to be prescribed a sample until time of appt, please advise.     Future Appointments  Date Time Provider Tavo Vick   9/7/2018 10:30 AM Kevan Werner  W. Gill Becerra

## 2018-09-07 ENCOUNTER — OFFICE VISIT (OUTPATIENT)
Dept: FAMILY MEDICINE CLINIC | Age: 78
End: 2018-09-07

## 2018-09-07 VITALS
BODY MASS INDEX: 37.31 KG/M2 | WEIGHT: 197.6 LBS | HEIGHT: 61 IN | HEART RATE: 58 BPM | DIASTOLIC BLOOD PRESSURE: 74 MMHG | OXYGEN SATURATION: 97 % | TEMPERATURE: 98.2 F | SYSTOLIC BLOOD PRESSURE: 128 MMHG | RESPIRATION RATE: 20 BRPM

## 2018-09-07 DIAGNOSIS — Z00.00 MEDICARE ANNUAL WELLNESS VISIT, SUBSEQUENT: Primary | ICD-10-CM

## 2018-09-07 DIAGNOSIS — Z13.39 SCREENING FOR ALCOHOLISM: ICD-10-CM

## 2018-09-07 DIAGNOSIS — H61.21 IMPACTED CERUMEN OF RIGHT EAR: ICD-10-CM

## 2018-09-07 DIAGNOSIS — Z13.1 SCREENING FOR DIABETES MELLITUS: ICD-10-CM

## 2018-09-07 DIAGNOSIS — Z23 ENCOUNTER FOR IMMUNIZATION: ICD-10-CM

## 2018-09-07 LAB — HBA1C MFR BLD HPLC: 5.2 %

## 2018-09-07 NOTE — PROGRESS NOTES
Luis Carlos Longo is a 69 y.o. female (: 1940) presenting to Chief Complaint Patient presents with [address] [place] [address] Annual Wellness Visit [address] [place] [address] Establish Care  Medication Refill  Medication Evaluation Vitals:  
 18 1046 BP: 128/74 Pulse: (!) 58 Resp: 20 Temp: 98.2 °F (36.8 °C) TempSrc: Oral  
SpO2: 97% Weight: 197 lb 9.6 oz (89.6 kg) Height: 5' 1\" (1.549 m) PainSc:   0 - No pain Hearing/Vision:  
 
 Visual Acuity Screening Right eye Left eye Both eyes Without correction:     
With correction: 20/30 20/30 20/30 Learning Assessment:  
 
Learning Assessment 2015 PRIMARY LEARNER Patient HIGHEST LEVEL OF EDUCATION - PRIMARY LEARNER  DID NOT GRADUATE HIGH SCHOOL  
BARRIERS PRIMARY LEARNER NONE  
CO-LEARNER CAREGIVER No  
PRIMARY LANGUAGE ENGLISH  NEED No  
LEARNER PREFERENCE PRIMARY DEMONSTRATION  
ANSWERED BY Patient RELATIONSHIP SELF Depression Screening: PHQ over the last two weeks 2018 Little interest or pleasure in doing things Not at all Feeling down, depressed, irritable, or hopeless Not at all Total Score PHQ 2 0 Fall Risk Assessment:  
 
Fall Risk Assessment, last 12 mths 2018 Able to walk? Yes Fall in past 12 months? No  
Fall with injury? -  
Number of falls in past 12 months - Fall Risk Score -  
 
Abuse Screening:  
 
Abuse Screening Questionnaire 2018 Do you ever feel afraid of your partner? Marvene Hence Are you in a relationship with someone who physically or mentally threatens you? Marvene Hence Is it safe for you to go home? Rashaun Chaudhary Coordination of Care Questionaire: 1. Have you been to the ER, urgent care clinic since your last visit? Hospitalized since your last visit? no 
 
2. Have you seen or consulted any other health care providers outside of the [address] [place] [address] since your last visit? Include any pap smears or colon screening. no 
 
Advanced Directive: 1. Do you have an Advanced Directive?  YES 
 
 2. Would you like information on Advanced Directives? NO Health Maintenance Due Topic Date Due  MEDICARE YEARLY EXAM  03/14/2018  
 FOOT EXAM Q1  05/05/2018  Influenza Age 5 to Adult  08/01/2018  HEMOGLOBIN A1C Q6M  09/20/2018 Ashok Cordova is a 69 y.o. female who presents for routine immunizations. She denies any symptoms , reactions or allergies that would exclude them from being immunized today. Risks and adverse reactions were discussed and the VIS was given to them. All questions were addressed. She was observed post injection. There were no reactions observed.  
 
Dangelo Easton LPN

## 2018-09-07 NOTE — PROGRESS NOTES
Cerumen Removal Procedure Note HPI Angie Vincent is a 69 y.o. female who presented to the office today for a cerumen removal procedure. Indications The cerumen removal will be performed due to cerumen impaction right ear. Procedure Details The risks, benefits, complications, treatment options, and expected outcomes were discussed with the patient. The patient concurred with the proposed plan, giving informed consent. Cerumen removal was performed today using ear lavage. Findings The findings include: cerumen impaction right ear Patient Response  
procedure successful, cerumen impaction removed. Patient Instruction Patient was advised to discontinue Q-Tips usage, apply peroxide +/- oil to ears every month - every week as needed and follow up in 6 months. Atul Barker NP 
9/7/2018

## 2018-09-07 NOTE — PATIENT INSTRUCTIONS
Vaccine Information Statement Influenza (Flu) Vaccine (Inactivated or Recombinant): What you need to know Many Vaccine Information Statements are available in [place]ian and other languages. See www.immunize.org/vis Hojas de Información Sobre Vacunas están disponibles en Español y en muchos otros idiomas. Visite www.immunize.org/vis 1. Why get vaccinated? Influenza (flu) is a contagious disease that spreads around the [place] every year, usually between October and May. Flu is caused by influenza viruses, and is spread mainly by coughing, sneezing, and close contact. Anyone can get flu. Flu strikes suddenly and can last several days. Symptoms vary by age, but can include: 
 fever/chills  sore throat  muscle aches  fatigue  cough  headache  runny or stuffy nose Flu can also lead to pneumonia and blood infections, and cause diarrhea and seizures in children. If you have a medical condition, such as heart or lung disease, flu can make it worse. Flu is more dangerous for some people. Infants and young children, people 72years of age and older, pregnant women, and people with certain health conditions or a weakened immune system are at greatest risk. Each year thousands of people in the [facility] die from flu, and many more are hospitalized. Flu vaccine can: 
 keep you from getting flu, 
 make flu less severe if you do get it, and 
 keep you from spreading flu to your family and other people. 2. Inactivated and recombinant flu vaccines A dose of flu vaccine is recommended every flu season. Children 6 months through 6years of age may need two doses during the same flu season. Everyone else needs only one dose each flu season.   
 
 
Some inactivated flu vaccines contain a very small amount of a mercury-based preservative called thimerosal. Studies have not shown thimerosal in vaccines to be harmful, but flu vaccines that do not contain thimerosal are available. There is no live flu virus in flu shots. They cannot cause the flu. There are many flu viruses, and they are always changing. Each year a new flu vaccine is made to protect against three or four viruses that are likely to cause disease in the upcoming flu season. But even when the vaccine doesnt exactly match these viruses, it may still provide some protection Flu vaccine cannot prevent: 
 flu that is caused by a virus not covered by the vaccine, or 
 illnesses that look like flu but are not. It takes about 2 weeks for protection to develop after vaccination, and protection lasts through the flu season. 3. Some people should not get this vaccine Tell the person who is giving you the vaccine:  If you have any severe, life-threatening allergies. If you ever had a life-threatening allergic reaction after a dose of flu vaccine, or have a severe allergy to any part of this vaccine, you may be advised not to get vaccinated. Most, but not all, types of flu vaccine contain a small amount of egg protein.  If you ever had Guillain-Barré Syndrome (also called GBS). Some people with a history of GBS should not get this vaccine. This should be discussed with your doctor.  If you are not feeling well. It is usually okay to get flu vaccine when you have a mild illness, but you might be asked to come back when you feel better. 4. Risks of a vaccine reaction With any medicine, including vaccines, there is a chance of reactions. These are usually mild and go away on their own, but serious reactions are also possible. Most people who get a flu shot do not have any problems with it. Minor problems following a flu shot include:  
 soreness, redness, or swelling where the shot was given  hoarseness  sore, red or itchy eyes  cough  fever  aches  headache  itching  fatigue If these problems occur, they usually begin soon after the shot and last 1 or 2 days. More serious problems following a flu shot can include the following:  There may be a small increased risk of Guillain-Barré Syndrome (GBS) after inactivated flu vaccine. This risk has been estimated at 1 or 2 additional cases per million people vaccinated. This is much lower than the risk of severe complications from flu, which can be prevented by flu vaccine.  Young children who get the flu shot along with pneumococcal vaccine (PCV13) and/or DTaP vaccine at the same time might be slightly more likely to have a seizure caused by fever. Ask your doctor for more information. Tell your doctor if a child who is getting flu vaccine has ever had a seizure. Problems that could happen after any injected vaccine:  People sometimes faint after a medical procedure, including vaccination. Sitting or lying down for about 15 minutes can help prevent fainting, and injuries caused by a fall. Tell your doctor if you feel dizzy, or have vision changes or ringing in the ears.  Some people get severe pain in the shoulder and have difficulty moving the arm where a shot was given. This happens very rarely.  Any medication can cause a severe allergic reaction. Such reactions from a vaccine are very rare, estimated at about 1 in a million doses, and would happen within a few minutes to a few hours after the vaccination. As with any medicine, there is a very remote chance of a vaccine causing a serious injury or death. The safety of vaccines is always being monitored. For more information, visit: www.cdc.gov/vaccinesafety/ 
 
5. What if there is a serious reaction? What should I look for?  Look for anything that concerns you, such as signs of a severe allergic reaction, very high fever, or unusual behavior.  
 
Signs of a severe allergic reaction can include hives, swelling of the face and throat, difficulty breathing, a fast heartbeat, dizziness, and weakness  usually within a few minutes to a few hours after the vaccination. What should I do?  If you think it is a severe allergic reaction or other emergency that cant wait, call 9-1-1 and get the person to the nearest hospital. Otherwise, call your doctor.  Reactions should be reported to the Vaccine Adverse Event Reporting System (VAERS). Your doctor should file this report, or you can do it yourself through  the VAERS web site at www.vaers. [facility].gov, or by calling 7-159.531.3795. VAERS does not give medical advice. 6. The National Vaccine Injury Compensation Program 
 
The [facility] Vaccine Injury Compensation Program (VICP) is a federal program that was created to compensate people who may have been injured by certain vaccines. Persons who believe they may have been injured by a vaccine can learn about the program and about filing a claim by calling 8-319.304.4267 or visiting the [address] [place] website at www.[facility].gov/vaccinecompensation. There is a time limit to file a claim for compensation. 7. How can I learn more?  Ask your healthcare provider. He or she can give you the vaccine package insert or suggest other sources of information.  Call your local or state health department.  Contact the Centers for Disease Control and Prevention (CDC): 
- Call 3-625.768.9636 (1-800-CDC-INFO) or 
- Visit CDCs website at www.cdc.gov/flu Vaccine Information Statement Inactivated Influenza Vaccine 8/7/2015 
[address] MARIANA Hutson [id number] Department of Health and [employer] Centers for Disease Control and Prevention Office Use Only Medicare Wellness Visit, Female The best way to live healthy is to have a lifestyle where you eat a well-balanced diet, exercise regularly, limit alcohol use, and quit all forms of tobacco/nicotine, if applicable. Regular preventive services are another way to keep healthy.  Preventive services (vaccines, screening tests, monitoring & exams) can help personalize your care plan, which helps you manage your own care. Screening tests can find health problems at the earliest stages, when they are easiest to treat. [facility] follows the current, evidence-based guidelines published by the [facility] David Dao ([facility]) when recommending preventive services for our patients. Because we follow these guidelines, sometimes recommendations change over time as research supports it. (For example, mammograms used to be recommended annually. Even though Medicare will still pay for an annual mammogram, the newer guidelines recommend a mammogram every two years for women of average risk.) Of course, you and your doctor may decide to screen more often for some diseases, based on your risk and your health status. Preventive services for you include: - Medicare offers their members a free annual wellness visit, which is time for you and your primary care provider to discuss and plan for your preventive service needs. Take advantage of this benefit every year! 
-All adults over the age of 72 should receive the recommended pneumonia vaccines. Current USPSTF guidelines recommend a series of two vaccines for the best pneumonia protection.  
-All adults should have a flu vaccine yearly and a tetanus vaccine every 10 years. All adults age 61 and older should receive a shingles vaccine once in their lifetime.   
-A bone mass density test is recommended when a woman turns 65 to screen for osteoporosis. This test is only recommended one time, as a screening. Some providers will use this same test as a disease monitoring tool if you already have osteoporosis.  
-All adults age 38-68 who are overweight should have a diabetes screening test once every three years.  
-Other screening tests and preventive services for persons with diabetes include: an eye exam to screen for diabetic retinopathy, a kidney function test, a foot exam, and stricter control over your cholesterol.  
-Cardiovascular screening for adults with routine risk involves an electrocardiogram (ECG) at intervals determined by your doctor.  
-Colorectal cancer screenings should be done for adults age 54-65 with no increased risk factors for colorectal cancer. There are a number of acceptable methods of screening for this type of cancer. Each test has its own benefits and drawbacks. Discuss with your doctor what is most appropriate for you during your annual wellness visit. The different tests include: colonoscopy (considered the best screening method), a fecal occult blood test, a fecal DNA test, and sigmoidoscopy. -Breast cancer screenings are recommended every other year for women of normal risk, age 54-69. 
-Cervical cancer screenings for women over age 72 are only recommended with certain risk factors.  
-All adults born between [facility] should be screened once for Hepatitis C. Here is a list of your current Health Maintenance items (your personalized list of preventive services) with a due date: 
Health Maintenance Due Topic Date Due  
 Flu Vaccine  08/01/2018

## 2018-09-07 NOTE — MR AVS SNAPSHOT
[address] [place] [address] Suleiman Perkins [address] [facility] [id number] [phone] Patient: Kar Gentile MRN: [id number] EOP:80/[id number] Visit Information Date & Time Provider Department Dept. Phone Encounter #  
 9/7/2018 10:30 AM Brendon Olson  [address] [phone] [id number] Upcoming Health Maintenance Date Due  
 MEDICARE YEARLY EXAM 3/14/2018 Influenza Age 5 to Adult 8/1/2018 HEMOGLOBIN A1C Q6M 9/20/2018 EYE EXAM RETINAL OR DILATED Q1 12/22/2018 MICROALBUMIN Q1 3/20/2019 LIPID PANEL Q1 3/20/2019 FOOT EXAM Q1 9/7/2019 GLAUCOMA SCREENING Q2Y 12/22/2019 DTaP/Tdap/Td series (2 - Td) 8/17/2025 Allergies as of 9/7/2018  Review Complete On: 9/7/2018 By: Brendon Olson NP Severity Noted Reaction Type Reactions Adhesive  10/05/2016    Other (comments) Skin tears Amoxicillin  09/28/2010    Hives Current Immunizations  Reviewed on 8/17/2015 Name Date Influenza Vaccine 10/24/2013  2:45 PM  
 Influenza Vaccine (Tri) Adjuvanted 9/7/2018 11:07 AM  
 Tdap 8/17/2015 11:51 AM  
  
 Not reviewed this visit You Were Diagnosed With   
  
 Codes Comments Medicare annual wellness visit, subsequent    -  Primary ICD-10-CM: Z00.00 ICD-9-CM: V70.0 Encounter for immunization     ICD-10-CM: P57 ICD-9-CM: V03.89 Screening for alcoholism     ICD-10-CM: Z13.89 ICD-9-CM: V79.1 Screening for diabetes mellitus     ICD-10-CM: Z13.1 ICD-9-CM: V77.1 Impacted cerumen of right ear     ICD-10-CM: H61.21 ICD-9-CM: 380.4 Vitals BP Pulse Temp Resp Height(growth percentile) Weight(growth percentile) 128/74 (BP 1 Location: Left arm, BP Patient Position: Sitting) (!) 58 98.2 °F (36.8 °C) (Oral) 20 5' 1\" (1.549 m) 197 lb 9.6 oz (89.6 kg) SpO2 BMI OB Status Smoking Status 97% 37.34 kg/m2 Postmenopausal Never Smoker BMI and BSA Data Body Mass Index Body Surface Area  
 37.34 kg/m 2 1.96 m 2 Preferred Pharmacy Pharmacy Name Phone [id number] [address] [phone] Your Updated Medication List  
  
   
This list is accurate as of 9/7/18 11:39 AM.  Always use your most recent med list.  
  
  
  
  
 albuterol 90 mcg/actuation inhaler Commonly known as:  PROVENTIL HFA, VENTOLIN HFA, PROAIR HFA Take 2 Puffs by inhalation every six (6) hours as needed for Wheezing. Blood-Glucose Meter monitoring kit Commonly known as:  Jose Roberto Blancas Test daily  
  
 carbinoxamine maleate 4 mg Tab Commonly known as:  G[address] Take 1 Tab by mouth three (3) times daily as needed (congestion). colesevelam 625 mg tablet Commonly known as:  [facility] Take 2 Tabs by mouth two (2) times daily (with meals). furosemide 40 mg tablet Commonly known as:  LASIX Take 1 Tab by mouth daily. NIFEdipine ER 30 mg ER tablet Commonly known as:  ADALAT CC Take 1 Tab by mouth daily. pantoprazole 40 mg tablet Commonly known as:  PROTONIX Take 1 Tab by mouth daily. pioglitazone-metFORMIN  mg per tablet Commonly known as:  actoplus met Take 1 Tab by mouth two (2) times daily (with meals). spironolactone 25 mg tablet Commonly known as:  ALDACTONE Take 1 Tab by mouth two (2) times a day. telmisartan 80 mg tablet Commonly known as:  Tylor Crate Take 1 Tab by mouth daily. We Performed the Following ADMIN INFLUENZA VIRUS VAC [ [facility]] AMB POC HEMOGLOBIN A1C [04227 CPT(R)] INFLUENZA VACCINE INACTIVATED (IIV), SUBUNIT, ADJUVANTED, IM [id number] CPT(R)] [place] ANNUAL ALCOHOL SCREEN 15 MIN [id number] [facility]] Patient Instructions Vaccine Information Statement Influenza (Flu) Vaccine (Inactivated or Recombinant): What you need to know Many Vaccine Information Statements are available in [place] and other languages. See www.immunize.org/vis Hojas de Información Sobre Vacunas están disponibles en Español y en muchos otros idiomas. Visite www.immunize.org/vis 1. Why get vaccinated? Influenza (flu) is a contagious disease that spreads around the [place] every year, usually between October and May. Flu is caused by influenza viruses, and is spread mainly by coughing, sneezing, and close contact. Anyone can get flu. Flu strikes suddenly and can last several days. Symptoms vary by age, but can include: 
 fever/chills  sore throat  muscle aches  fatigue  cough  headache  runny or stuffy nose Flu can also lead to pneumonia and blood infections, and cause diarrhea and seizures in children. If you have a medical condition, such as heart or lung disease, flu can make it worse. Flu is more dangerous for some people. Infants and young children, people 72years of age and older, pregnant women, and people with certain health conditions or a weakened immune system are at greatest risk. Each year thousands of people in the [facility] die from flu, and many more are hospitalized. Flu vaccine can: 
 keep you from getting flu, 
 make flu less severe if you do get it, and 
 keep you from spreading flu to your family and other people. 2. Inactivated and recombinant flu vaccines A dose of flu vaccine is recommended every flu season. Children 6 months through 6years of age may need two doses during the same flu season. Everyone else needs only one dose each flu season. Some inactivated flu vaccines contain a very small amount of a mercury-based preservative called thimerosal. Studies have not shown thimerosal in vaccines to be harmful, but flu vaccines that do not contain thimerosal are available. There is no live flu virus in flu shots. They cannot cause the flu. There are many flu viruses, and they are always changing. Each year a new flu vaccine is made to protect against three or four viruses that are likely to cause disease in the upcoming flu season. But even when the vaccine doesnt exactly match these viruses, it may still provide some protection Flu vaccine cannot prevent: 
 flu that is caused by a virus not covered by the vaccine, or 
 illnesses that look like flu but are not. It takes about 2 weeks for protection to develop after vaccination, and protection lasts through the flu season. 3. Some people should not get this vaccine Tell the person who is giving you the vaccine:  If you have any severe, life-threatening allergies. If you ever had a life-threatening allergic reaction after a dose of flu vaccine, or have a severe allergy to any part of this vaccine, you may be advised not to get vaccinated. Most, but not all, types of flu vaccine contain a small amount of egg protein.  If you ever had Guillain-Barré Syndrome (also called GBS). Some people with a history of GBS should not get this vaccine. This should be discussed with your doctor.  If you are not feeling well. It is usually okay to get flu vaccine when you have a mild illness, but you might be asked to come back when you feel better. 4. Risks of a vaccine reaction With any medicine, including vaccines, there is a chance of reactions. These are usually mild and go away on their own, but serious reactions are also possible. Most people who get a flu shot do not have any problems with it. Minor problems following a flu shot include:  
 soreness, redness, or swelling where the shot was given  hoarseness  sore, red or itchy eyes  cough  fever  aches  headache  itching  fatigue If these problems occur, they usually begin soon after the shot and last 1 or 2 days. More serious problems following a flu shot can include the following:  There may be a small increased risk of Guillain-Barré Syndrome (GBS) after inactivated flu vaccine. This risk has been estimated at 1 or 2 additional cases per million people vaccinated. This is much lower than the risk of severe complications from flu, which can be prevented by flu vaccine.  Young children who get the flu shot along with pneumococcal vaccine (PCV13) and/or DTaP vaccine at the same time might be slightly more likely to have a seizure caused by fever. Ask your doctor for more information. Tell your doctor if a child who is getting flu vaccine has ever had a seizure. Problems that could happen after any injected vaccine:  People sometimes faint after a medical procedure, including vaccination. Sitting or lying down for about 15 minutes can help prevent fainting, and injuries caused by a fall. Tell your doctor if you feel dizzy, or have vision changes or ringing in the ears.  Some people get severe pain in the shoulder and have difficulty moving the arm where a shot was given. This happens very rarely.  Any medication can cause a severe allergic reaction. Such reactions from a vaccine are very rare, estimated at about 1 in a million doses, and would happen within a few minutes to a few hours after the vaccination. As with any medicine, there is a very remote chance of a vaccine causing a serious injury or death. The safety of vaccines is always being monitored. For more information, visit: www.cdc.gov/vaccinesafety/ 
 
5. What if there is a serious reaction? What should I look for?  Look for anything that concerns you, such as signs of a severe allergic reaction, very high fever, or unusual behavior. Signs of a severe allergic reaction can include hives, swelling of the face and throat, difficulty breathing, a fast heartbeat, dizziness, and weakness  usually within a few minutes to a few hours after the vaccination. What should I do?  If you think it is a severe allergic reaction or other emergency that cant wait, call 9-1-1 and get the person to the nearest hospital. Otherwise, call your doctor.  Reactions should be reported to the Vaccine Adverse Event Reporting System (VAERS). Your doctor should file this report, or you can do it yourself through  the VAERS web site at www.vaers. [facility].gov, or by calling 2-467.591.2469. VAERS does not give medical advice. 6. The National Vaccine Injury Compensation Program 
 
The [facility] Vaccine Injury Compensation Program (VICP) is a federal program that was created to compensate people who may have been injured by certain vaccines. Persons who believe they may have been injured by a vaccine can learn about the program and about filing a claim by calling 0-763.591.2125 or visiting the [employer] Information website at www.[facility].gov/vaccinecompensation. There is a time limit to file a claim for compensation. 7. How can I learn more?  Ask your healthcare provider. He or she can give you the vaccine package insert or suggest other sources of information.  Call your local or state health department.  Contact the Centers for Disease Control and Prevention (CDC): 
- Call 3-265.150.5691 (7-865-UGN-INFO) or 
- Visit CDCs website at www.cdc.gov/flu Vaccine Information Statement Inactivated Influenza Vaccine 8/7/2015 
42 MARIANA Leslie [id number] Department of [facility] and [employer] Centers for Disease Control and Prevention Office Use Only Medicare Wellness Visit, Female The best way to live healthy is to have a lifestyle where you eat a well-balanced diet, exercise regularly, limit alcohol use, and quit all forms of tobacco/nicotine, if applicable. Regular preventive services are another way to keep healthy. Preventive services (vaccines, screening tests, monitoring & exams) can help personalize your care plan, which helps you manage your own care. Screening tests can find health problems at the earliest stages, when they are easiest to treat. [facility] follows the current, evidence-based guidelines published by the [facility] David Feliberto (USPSTF) when recommending preventive services for our patients. Because we follow these guidelines, sometimes recommendations change over time as research supports it. (For example, mammograms used to be recommended annually. Even though Medicare will still pay for an annual mammogram, the newer guidelines recommend a mammogram every two years for women of average risk.) Of course, you and your doctor may decide to screen more often for some diseases, based on your risk and your health status. Preventive services for you include: - Medicare offers their members a free annual wellness visit, which is time for you and your primary care provider to discuss and plan for your preventive service needs. Take advantage of this benefit every year! 
-All adults over the age of 72 should receive the recommended pneumonia vaccines. Current USPSTF guidelines recommend a series of two vaccines for the best pneumonia protection.  
-All adults should have a flu vaccine yearly and a tetanus vaccine every 10 years. All adults age 61 and older should receive a shingles vaccine once in their lifetime.   
-A bone mass density test is recommended when a woman turns 65 to screen for osteoporosis. This test is only recommended one time, as a screening. Some providers will use this same test as a disease monitoring tool if you already have osteoporosis. -All adults age 38-68 who are overweight should have a diabetes screening test once every three years.  
-Other screening tests and preventive services for persons with diabetes include: an eye exam to screen for diabetic retinopathy, a kidney function test, a foot exam, and stricter control over your cholesterol. -Cardiovascular screening for adults with routine risk involves an electrocardiogram (ECG) at intervals determined by your doctor.  
-Colorectal cancer screenings should be done for adults age 54-65 with no increased risk factors for colorectal cancer. There are a number of acceptable methods of screening for this type of cancer. Each test has its own benefits and drawbacks. Discuss with your doctor what is most appropriate for you during your annual wellness visit. The different tests include: colonoscopy (considered the best screening method), a fecal occult blood test, a fecal DNA test, and sigmoidoscopy. -Breast cancer screenings are recommended every other year for women of normal risk, age 54-69. 
-Cervical cancer screenings for women over age 72 are only recommended with certain risk factors.  
-All adults born between [facility] should be screened once for Hepatitis C. Here is a list of your current Health Maintenance items (your personalized list of preventive services) with a due date: 
Health Maintenance Due Topic Date Due  
 Annual Well Visit  03/14/2018  Flu Vaccine  08/01/2018  Hemoglobin A1C    09/20/2018 Introducing [facility]! [employer] introduces [employer] patient portal. Now you can access parts of your medical record, email your doctor's office, and request medication refills online. 1. In your internet browser, go to [email or web address] 2. Click on the First Time User? Click Here link in the Sign In box. You will see the New Member Sign Up page. 3. Enter your [employer] Access Code exactly as it appears below. You will not need to use this code after youve completed the sign-up process. If you do not sign up before the expiration date, you must request a new code. · [employer] Access Code: [email or web address] Expires: 12/6/2018 11:38 AM 
 
4.  Enter the last four digits of your Social Security Number (xxxx) and Date of Birth (mm/dd/yyyy) as indicated and click Submit. You will be taken to the next sign-up page. 5. Create a [employer] ID. This will be your [employer] login ID and cannot be changed, so think of one that is secure and easy to remember. 6. Create a [employer] password. You can change your password at any time. 7. Enter your Password Reset Question and Answer. This can be used at a later time if you forget your password. 8. Enter your e-mail address. You will receive e-mail notification when new information is available in [address] 9. Click Sign Up. You can now view and download portions of your medical record. 10. Click the Download Summary menu link to download a portable copy of your medical information. If you have questions, please visit the Frequently Asked Questions section of the [employer] website. Remember, [employer] is NOT to be used for urgent needs. For medical emergencies, dial 911. Now available from your iPhone and Android! Please provide this summary of care documentation to your next provider. Your primary care clinician is listed as Viktor Nolan. If you have any questions after today's visit, please call [phone].

## 2018-09-07 NOTE — PROGRESS NOTES
This is the Subsequent Medicare Annual Wellness Exam, performed 12 months or more after the Initial AWV or the last Subsequent AWV I have reviewed the patient's medical history in detail and updated the computerized patient record. History Past Medical History:  
Diagnosis Date  Cancer [facility])   
 colon  Diabetes ([address]  Hypertension Past Surgical History:  
Procedure Laterality Date  ABDOMEN SURGERY PROC UNLISTED    
 colectomy  ENDOSCOPY, COLON, DIAGNOSTIC Current Outpatient Prescriptions Medication Sig Dispense Refill  telmisartan (MICARDIS) 80 mg tablet Take 1 Tab by mouth daily. 30 Tab 0  
 furosemide (LASIX) 40 mg tablet Take 1 Tab by mouth daily. 90 Tab 3  pioglitazone-metFORMIN (ACTOPLUS MET)  mg per tablet Take 1 Tab by mouth two (2) times daily (with meals). 180 Tab 3  
 spironolactone (ALDACTONE) 25 mg tablet Take 1 Tab by mouth two (2) times a day. 180 Tab 3  
 colesevelam (WELCHOL) 625 mg tablet Take 2 Tabs by mouth two (2) times daily (with meals). 360 Tab 3  
 albuterol (PROVENTIL HFA, VENTOLIN HFA, PROAIR HFA) 90 mcg/actuation inhaler Take 2 Puffs by inhalation every six (6) hours as needed for Wheezing. 1 Inhaler 3  Blood-Glucose Meter (ONETOUCH ULTRA2) monitoring kit Test daily 1 Kit 0  
 pantoprazole (PROTONIX) 40 mg tablet Take 1 Tab by mouth daily. 90 Tab 3  carbinoxamine maleate (PALGIC) 4 mg tab Take 1 Tab by mouth three (3) times daily as needed (congestion). 100 Tab 2  
 NIFEdipine ER (ADALAT CC) 30 mg ER tablet Take 1 Tab by mouth daily. 90 Tab 1 Allergies Allergen Reactions  Adhesive Other (comments) Skin tears  Amoxicillin Hives No family history on file. Social History Substance Use Topics  Smoking status: Never Smoker  Smokeless tobacco: Never Used  Alcohol use No  
 
Patient Active Problem List  
Diagnosis Code  Iron deficiency anemia D50.9  Type 2 diabetes mellitus without complication, without long-term current use of insulin ([facility]) E11.9  Blood in stool K92.1  Secondary localized osteoarthrosis, lower leg M17.5  Unspecified otitis media H66.90  BPPV (benign paroxysmal positional vertigo) H81.10  Type 2 diabetes with nephropathy ([facility]) E11.21  
 Severe obesity (BMI 35.0-39. 9) with comorbidity ([address] E66.01 Depression Risk Factor Screening: PHQ over the last two weeks 9/7/2018 Little interest or pleasure in doing things Not at all Feeling down, depressed, irritable, or hopeless Not at all Total Score PHQ 2 0 Alcohol Risk Factor Screening: You do not drink alcohol or very rarely. Functional Ability and Level of Safety:  
Hearing Loss Hearing is good. Activities of Daily Living The home contains: no safety equipment. Patient does total self care Fall Risk Fall Risk Assessment, last 12 mths 9/7/2018 Able to walk? Yes Fall in past 12 months? No  
Fall with injury? -  
Number of falls in past 12 months - Fall Risk Score -  
 
 
Abuse Screen Patient is not abused Cognitive Screening Evaluation of Cognitive Function: 
Has your family/caregiver stated any concerns about your memory: no 
Normal 
 
Patient Care Team  
Patient Care Team: 
Liv Sanchez MD as PCP - Melly Tomlinson MD as Consulting Provider (Gastroenterology) Monika Lopez RN as Ambulatory Care Navigator Edgar Richardson MD as Consulting Provider (Ophthalmology) Mariana Luther DPM as Consulting Provider (Podiatry) Erika Villalobos MD (Inactive) as Consulting Provider (Orthopedic Surgery) Tiffanie Kimball MD (Orthopedic Surgery) Assessment/Plan Education and counseling provided: 
Are appropriate based on today's review and evaluation Diagnoses and all orders for this visit: 
 
1. Type 2 diabetes mellitus without complication, without long-term current use of insulin ([facility] -     AMB POC HEMOGLOBIN A1C 
 2. Encounter for immunization -     Influenza Vaccine Inactivated (IIV)(FLUAD), Subunit, Adjuvanted, IM, (41241) -     Administration fee () for Medicare insured patients 3. Medicare annual wellness visit, subsequent -     Annual  Alcohol Screen 15 min () 4. Screening for alcoholism -     Annual  Alcohol Screen 15 min () 5. Screening for diabetes mellitus -     AMB POC A1C Health Maintenance Due Topic Date Due  Influenza Age 5 to Adult  08/01/2018

## 2018-09-21 ENCOUNTER — HOSPITAL ENCOUNTER (OUTPATIENT)
Dept: LAB | Age: 78
Discharge: HOME | End: 2018-09-21
Payer: MEDICARE

## 2018-09-21 DIAGNOSIS — E55.9 VITAMIN D DEFICIENCY: ICD-10-CM

## 2018-09-21 DIAGNOSIS — E11.9 TYPE 2 DIABETES MELLITUS WITHOUT COMPLICATION, WITHOUT LONG-TERM CURRENT USE OF INSULIN (HCC): Chronic | ICD-10-CM

## 2018-09-21 DIAGNOSIS — E78.5 HYPERLIPIDEMIA, UNSPECIFIED HYPERLIPIDEMIA TYPE: ICD-10-CM

## 2018-09-21 LAB
ALBUMIN SERPL-MCNC: 3.4 G/DL (ref 3.4–5)
ALBUMIN/GLOB SERPL: 0.9 {RATIO} (ref 0.8–1.7)
ALP SERPL-CCNC: 81 U/L (ref 45–117)
ALT SERPL-CCNC: 16 U/L (ref 13–56)
ANION GAP SERPL CALC-SCNC: 6 MMOL/L (ref 3–18)
AST SERPL-CCNC: 17 U/L (ref 15–37)
BASOPHILS # BLD: 0 K/UL (ref 0–0.1)
BASOPHILS NFR BLD: 0 % (ref 0–2)
BILIRUB SERPL-MCNC: 0.4 MG/DL (ref 0.2–1)
BUN SERPL-MCNC: 61 MG/DL (ref 7–18)
BUN/CREAT SERPL: 25 (ref 12–20)
CALCIUM SERPL-MCNC: 9 MG/DL (ref 8.5–10.1)
CHLORIDE SERPL-SCNC: 111 MMOL/L (ref 100–108)
CHOLEST SERPL-MCNC: 172 MG/DL
CO2 SERPL-SCNC: 21 MMOL/L (ref 21–32)
CREAT SERPL-MCNC: 2.42 MG/DL (ref 0.6–1.3)
DIFFERENTIAL METHOD BLD: ABNORMAL
EOSINOPHIL # BLD: 0.1 K/UL (ref 0–0.4)
EOSINOPHIL NFR BLD: 1 % (ref 0–5)
ERYTHROCYTE [DISTWIDTH] IN BLOOD BY AUTOMATED COUNT: 14.6 % (ref 11.6–14.5)
GLOBULIN SER CALC-MCNC: 3.7 G/DL (ref 2–4)
GLUCOSE SERPL-MCNC: 80 MG/DL (ref 74–99)
HCT VFR BLD AUTO: 29.2 % (ref 35–45)
HDLC SERPL-MCNC: 75 MG/DL (ref 40–60)
HDLC SERPL: 2.3 {RATIO} (ref 0–5)
HGB BLD-MCNC: 9.2 G/DL (ref 12–16)
LDLC SERPL CALC-MCNC: 83 MG/DL (ref 0–100)
LIPID PROFILE,FLP: ABNORMAL
LYMPHOCYTES # BLD: 2 K/UL (ref 0.9–3.6)
LYMPHOCYTES NFR BLD: 37 % (ref 21–52)
MCH RBC QN AUTO: 30.4 PG (ref 24–34)
MCHC RBC AUTO-ENTMCNC: 31.5 G/DL (ref 31–37)
MCV RBC AUTO: 96.4 FL (ref 74–97)
MONOCYTES # BLD: 0.4 K/UL (ref 0.05–1.2)
MONOCYTES NFR BLD: 8 % (ref 3–10)
NEUTS SEG # BLD: 2.8 K/UL (ref 1.8–8)
NEUTS SEG NFR BLD: 54 % (ref 40–73)
PLATELET # BLD AUTO: 179 K/UL (ref 135–420)
PMV BLD AUTO: 10.4 FL (ref 9.2–11.8)
POTASSIUM SERPL-SCNC: 5.9 MMOL/L (ref 3.5–5.5)
PROT SERPL-MCNC: 7.1 G/DL (ref 6.4–8.2)
RBC # BLD AUTO: 3.03 M/UL (ref 4.2–5.3)
SODIUM SERPL-SCNC: 138 MMOL/L (ref 136–145)
T4 FREE SERPL-MCNC: 1.1 NG/DL (ref 0.7–1.5)
TRIGL SERPL-MCNC: 70 MG/DL (ref ?–150)
TSH SERPL DL<=0.05 MIU/L-ACNC: 1.8 UIU/ML (ref 0.36–3.74)
VLDLC SERPL CALC-MCNC: 14 MG/DL
WBC # BLD AUTO: 5.2 K/UL (ref 4.6–13.2)

## 2018-09-21 PROCEDURE — 80061 LIPID PANEL: CPT | Performed by: INTERNAL MEDICINE

## 2018-09-21 PROCEDURE — 36415 COLL VENOUS BLD VENIPUNCTURE: CPT | Performed by: INTERNAL MEDICINE

## 2018-09-21 PROCEDURE — 84439 ASSAY OF FREE THYROXINE: CPT | Performed by: INTERNAL MEDICINE

## 2018-09-21 PROCEDURE — 82043 UR ALBUMIN QUANTITATIVE: CPT | Performed by: INTERNAL MEDICINE

## 2018-09-21 PROCEDURE — 82306 VITAMIN D 25 HYDROXY: CPT | Performed by: INTERNAL MEDICINE

## 2018-09-21 PROCEDURE — 85025 COMPLETE CBC W/AUTO DIFF WBC: CPT | Performed by: INTERNAL MEDICINE

## 2018-09-21 PROCEDURE — 80053 COMPREHEN METABOLIC PANEL: CPT | Performed by: INTERNAL MEDICINE

## 2018-09-21 PROCEDURE — 84443 ASSAY THYROID STIM HORMONE: CPT | Performed by: INTERNAL MEDICINE

## 2018-09-22 LAB
25(OH)D3 SERPL-MCNC: 9.5 NG/ML (ref 30–100)
CREAT UR-MCNC: 69.93 MG/DL (ref 30–125)
MICROALBUMIN UR-MCNC: 0.4 MG/DL (ref 0–3)
MICROALBUMIN/CREAT UR-RTO: <6 MG/G (ref 0–30)

## 2018-09-28 RX ORDER — TELMISARTAN 80 MG/1
80 TABLET ORAL DAILY
Qty: 90 TAB | Refills: 0 | Status: SHIPPED | OUTPATIENT
Start: 2018-09-28 | End: 2018-12-26 | Stop reason: SDUPTHER

## 2018-10-05 RX ORDER — ERGOCALCIFEROL 1.25 MG/1
50000 CAPSULE ORAL
Qty: 12 CAP | Refills: 1 | Status: SHIPPED | OUTPATIENT
Start: 2018-10-05 | End: 2019-06-05 | Stop reason: SDUPTHER

## 2018-10-05 NOTE — PROGRESS NOTES
Patient advised. [facility]    Please call the patient regarding her abnormal result. Vit D low start 50,000 units weekly and 5000 units daily OTC.

## 2018-10-22 ENCOUNTER — OFFICE VISIT (OUTPATIENT)
Dept: FAMILY MEDICINE CLINIC | Age: 78
End: 2018-10-22

## 2018-10-22 VITALS
HEART RATE: 67 BPM | DIASTOLIC BLOOD PRESSURE: 80 MMHG | BODY MASS INDEX: 37.76 KG/M2 | SYSTOLIC BLOOD PRESSURE: 138 MMHG | WEIGHT: 200 LBS | OXYGEN SATURATION: 97 % | RESPIRATION RATE: 18 BRPM | HEIGHT: 61 IN | TEMPERATURE: 97.8 F

## 2018-10-22 DIAGNOSIS — E11.21 TYPE 2 DIABETES WITH NEPHROPATHY (HCC): ICD-10-CM

## 2018-10-22 DIAGNOSIS — K21.9 GASTROESOPHAGEAL REFLUX DISEASE WITHOUT ESOPHAGITIS: ICD-10-CM

## 2018-10-22 DIAGNOSIS — Z12.39 BREAST CANCER SCREENING: ICD-10-CM

## 2018-10-22 DIAGNOSIS — B02.9 HERPES ZOSTER WITHOUT COMPLICATION: Primary | ICD-10-CM

## 2018-10-22 RX ORDER — PIOGLITAZONE HCL AND METFORMIN HCL 500; 15 MG/1; MG/1
1 TABLET ORAL 2 TIMES DAILY WITH MEALS
Qty: 180 TAB | Refills: 2 | Status: SHIPPED | OUTPATIENT
Start: 2018-10-22 | End: 2018-12-26 | Stop reason: SDUPTHER

## 2018-10-22 RX ORDER — PANTOPRAZOLE SODIUM 40 MG/1
40 TABLET, DELAYED RELEASE ORAL DAILY
Qty: 90 TAB | Refills: 2 | Status: SHIPPED | OUTPATIENT
Start: 2018-10-22 | End: 2019-02-20

## 2018-10-22 RX ORDER — LIDOCAINE AND PRILOCAINE 25; 25 MG/G; MG/G
CREAM TOPICAL
Qty: 30 G | Refills: 2 | Status: SHIPPED | OUTPATIENT
Start: 2018-10-22 | End: 2019-01-08

## 2018-10-22 RX ORDER — VALACYCLOVIR HYDROCHLORIDE 1 G/1
1000 TABLET, FILM COATED ORAL 3 TIMES DAILY
Qty: 21 TAB | Refills: 0 | Status: SHIPPED | OUTPATIENT
Start: 2018-10-22 | End: 2019-01-08

## 2018-10-22 NOTE — PROGRESS NOTES
Assessment/Plan:    *Diagnoses and all orders for this visit:    1. Herpes zoster without complication    2. Type 2 diabetes with nephropathy (HCC)  -     pioglitazone-metFORMIN (ACTOPLUS MET)  mg per tablet; Take 1 Tab by mouth two (2) times daily (with meals). 3. Gastroesophageal reflux disease without esophagitis  -     pantoprazole (PROTONIX) 40 mg tablet; Take 1 Tab by mouth daily. 4. Breast cancer screening  -     [facility] MAMMO BI SCREENING INCL CAD; Future    Other orders  -     valACYclovir (VALTREX) 1 gram tablet; Take 1 Tab by mouth three (3) times daily. -     lidocaine-prilocaine (EMLA) topical cream; Apply a thin film to the affected area three times daily. The plan was discussed with the patient. The patient verbalized understanding and is in agreement with the plan. All medication potential side effects were discussed with the patient.    -------------------------------------------------------------------------------------------------------------------        Naa Malave is a 69 y.o. female and presents with Shingles         Subjective:  Pt here for a new rash that appeared < 48 hrs ago. Painful, red, wraps around the RT side of her. Was initially a little itchy but soon led into a painful rash. Has had chicken pox as a child. GERD: stable and needs refill on Protonix. DM: stable, last A1c 5.2%. Needs med renewed. ROS:  Review of Systems - Negative except as above        The problem list was updated as a part of today's visit. Patient Active Problem List   Diagnosis Code    Iron deficiency anemia D50.9    Type 2 diabetes mellitus without complication, without long-term current use of insulin ([facility] E11.9    Blood in stool K92.1    Secondary localized osteoarthrosis, lower leg M17.5    Unspecified otitis media H66.90    BPPV (benign paroxysmal positional vertigo) H81.10    Type 2 diabetes with nephropathy (HCC) E11.21    Severe obesity (BMI 35.0-39. 9) with comorbidity ([facility] E66.01       The PSH, FH were reviewed. SH:  Social History     Tobacco Use    Smoking status: Never Smoker    Smokeless tobacco: Never Used   Substance Use Topics    Alcohol use: No    Drug use: No       Medications/Allergies:  Current Outpatient Medications on File Prior to Visit   Medication Sig Dispense Refill    ergocalciferol (ERGOCALCIFEROL) 50,000 unit capsule Take 1 Cap by mouth every seven (7) days. 12 Cap 1    telmisartan (MICARDIS) 80 mg tablet Take 1 Tab by mouth daily. 90 Tab 0    furosemide (LASIX) 40 mg tablet Take 1 Tab by mouth daily. 90 Tab 3    spironolactone (ALDACTONE) 25 mg tablet Take 1 Tab by mouth two (2) times a day. 180 Tab 3    colesevelam (WELCHOL) 625 mg tablet Take 2 Tabs by mouth two (2) times daily (with meals). 360 Tab 3    albuterol (PROVENTIL HFA, VENTOLIN HFA, PROAIR HFA) 90 mcg/actuation inhaler Take 2 Puffs by inhalation every six (6) hours as needed for Wheezing. 1 Inhaler 3    Blood-Glucose Meter (ONETOUCH ULTRA2) monitoring kit Test daily 1 Kit 0    carbinoxamine maleate (PALGIC) 4 mg tab Take 1 Tab by mouth three (3) times daily as needed (congestion). 100 Tab 2    NIFEdipine ER (ADALAT CC) 30 mg ER tablet Take 1 Tab by mouth daily. 90 Tab 1     No current facility-administered medications on file prior to visit.          Allergies   Allergen Reactions    Adhesive Other (comments)     Skin tears    Amoxicillin Hives         Health Maintenance:   Health Maintenance   Topic Date Due    Shingrix Vaccine Age 49> (1 of 2) 12/03/1991    EYE EXAM RETINAL OR DILATED Q1  12/22/2018    HEMOGLOBIN A1C Q6M  03/07/2019    FOOT EXAM Q1  09/07/2019    MEDICARE YEARLY EXAM  09/08/2019    MICROALBUMIN Q1  09/21/2019    LIPID PANEL Q1  09/21/2019    GLAUCOMA SCREENING Q2Y  12/22/2019    DTaP/Tdap/Td series (2 - Td) 08/17/2025    Bone Densitometry (Dexa) Screening  Completed    Influenza Age 5 to Adult  Completed    Pneumococcal 65+ Low/Medium Risk  Addressed       Objective:  Visit Vitals  /80 (BP 1 Location: Right arm, BP Patient Position: Sitting)   Pulse 67   Temp 97.8 °F (36.6 °C) (Oral)   Resp 18   Ht 5' 1\" (1.549 m)   Wt 200 lb (90.7 kg)   SpO2 97%   BMI 37.79 kg/m²          Nurses notes and VS reviewed. Physical Examination: General appearance - alert, well appearing, and in no distress  Chest - clear to auscultation, no wheezes, rales or rhonchi, symmetric air entry  Heart - normal rate, regular rhythm, normal S1, S2, no murmurs, rubs, clicks or gallops  Skin - LESIONS NOTED: erythematous, pustules on the LT thorax under the LT breast and around to the LT back        Labwork and Ancillary Studies:    CBC w/Diff  Lab Results   Component Value Date/Time    WBC 5.2 09/21/2018 10:24 AM    HGB 9.2 (L) 09/21/2018 10:24 AM    PLATELET 694 71/59/8054 10:24 AM         Basic Metabolic Profile  Lab Results   Component Value Date/Time    Sodium 138 09/21/2018 10:24 AM    Potassium 5.9 (H) 09/21/2018 10:24 AM    Chloride 111 (H) 09/21/2018 10:24 AM    CO2 21 09/21/2018 10:24 AM    Anion gap 6 09/21/2018 10:24 AM    Glucose 80 09/21/2018 10:24 AM    BUN 61 (H) 09/21/2018 10:24 AM    Creatinine 2.42 (H) 09/21/2018 10:24 AM    BUN/Creatinine ratio 25 (H) 09/21/2018 10:24 AM    GFR est AA 24 (L) 09/21/2018 10:24 AM    GFR est non-AA 19 (L) 09/21/2018 10:24 AM    Calcium 9.0 09/21/2018 10:24 AM         LFT  Lab Results   Component Value Date/Time    ALT (SGPT) 16 09/21/2018 10:24 AM    AST (SGOT) 17 09/21/2018 10:24 AM    Alk.  phosphatase 81 09/21/2018 10:24 AM    Bilirubin, total 0.4 09/21/2018 10:24 AM         Cholesterol  Lab Results   Component Value Date/Time    Cholesterol, total 172 09/21/2018 10:24 AM    HDL Cholesterol 75 (H) 09/21/2018 10:24 AM    LDL, calculated 83 09/21/2018 10:24 AM    Triglyceride 70 09/21/2018 10:24 AM    CHOL/HDL Ratio 2.3 09/21/2018 10:24 AM

## 2018-10-22 NOTE — PROGRESS NOTES
Alex Iglesias is a 69 y.o. female (: 1940) presenting to address: Patient stated that she noticed some \"spots\" on her back, close to where her bra line is, and that it is painful. She stated that she took her bra off on Saturday because she thought that maybe she had been bit by a bug, but noticed that she had more \"spots\" come up. Chief Complaint   Patient presents with    Shingles       Vitals:    10/22/18 1429   BP: 138/80   Pulse: 67   Resp: 18   Temp: 97.8 °F (36.6 °C)   TempSrc: Oral   SpO2: 97%   Weight: 200 lb (90.7 kg)   Height: 5' 1\" (1.549 m)   PainSc:   8   PainLoc: Back       Hearing/Vision:   No exam data present    Learning Assessment:     Learning Assessment 2015   PRIMARY LEARNER Patient   HIGHEST LEVEL OF EDUCATION - PRIMARY LEARNER  DID NOT GRADUATE HIGH SCHOOL   BARRIERS PRIMARY LEARNER NONE   CO-LEARNER CAREGIVER No   PRIMARY LANGUAGE ENGLISH    NEED No   LEARNER PREFERENCE PRIMARY DEMONSTRATION   ANSWERED BY Patient   RELATIONSHIP SELF     Depression Screening:     PHQ over the last two weeks 2018   Little interest or pleasure in doing things Not at all   Feeling down, depressed, irritable, or hopeless Not at all   Total Score PHQ 2 0     Fall Risk Assessment:     Fall Risk Assessment, last 12 mths 2018   Able to walk? Yes   Fall in past 12 months? No   Fall with injury? -   Number of falls in past 12 months -   Fall Risk Score -     Abuse Screening:     Abuse Screening Questionnaire 2018   Do you ever feel afraid of your partner? N   Are you in a relationship with someone who physically or mentally threatens you? N   Is it safe for you to go home? Y     Coordination of Care Questionaire:   1. Have you been to the ER, urgent care clinic since your last visit? Hospitalized since your last visit? NO    2. Have you seen or consulted any other health care providers outside of the [address] [place] [address] since your last visit?   Include any pap smears or colon screening. NO    Advanced Directive:   1. Do you have an Advanced Directive? NO    2. Would you like information on Advanced Directives?  NO

## 2018-10-22 NOTE — PATIENT INSTRUCTIONS

## 2018-12-26 DIAGNOSIS — E11.21 TYPE 2 DIABETES WITH NEPHROPATHY (HCC): ICD-10-CM

## 2018-12-26 RX ORDER — ERGOCALCIFEROL 1.25 MG/1
50000 CAPSULE ORAL
Qty: 12 CAP | Refills: 1 | Status: CANCELLED | OUTPATIENT
Start: 2018-12-26

## 2018-12-26 NOTE — TELEPHONE ENCOUNTER
Patient calling to request refill on: 12/26/18      Remaining pills:  Last appt: 10/22/18  Next appt: 1/8/19    Pharmacy:[facility]       Patient aware of 72 hour time frame. Pt is a current pt of RESHMA Bernal but she she approved to see Dr Wisdom Staff and has set up an appt w/ Dr Wisdom Staff. I'm not sure who to send the refill request to.  Please advise

## 2018-12-27 NOTE — TELEPHONE ENCOUNTER
Forwarded to TIMMY Whitfield for review. Please be aware provider will return to office on 12/31/2018.

## 2018-12-31 RX ORDER — FUROSEMIDE 40 MG/1
40 TABLET ORAL DAILY
Qty: 90 TAB | Refills: 0 | Status: SHIPPED | OUTPATIENT
Start: 2018-12-31 | End: 2019-01-08

## 2018-12-31 RX ORDER — SPIRONOLACTONE 25 MG/1
25 TABLET ORAL 2 TIMES DAILY
Qty: 180 TAB | Refills: 0 | Status: SHIPPED | OUTPATIENT
Start: 2018-12-31 | End: 2019-02-20

## 2018-12-31 RX ORDER — TELMISARTAN 80 MG/1
80 TABLET ORAL DAILY
Qty: 90 TAB | Refills: 0 | Status: SHIPPED | OUTPATIENT
Start: 2018-12-31 | End: 2019-02-20

## 2018-12-31 RX ORDER — PIOGLITAZONE HCL AND METFORMIN HCL 500; 15 MG/1; MG/1
1 TABLET ORAL 2 TIMES DAILY WITH MEALS
Qty: 180 TAB | Refills: 0 | Status: SHIPPED | OUTPATIENT
Start: 2018-12-31 | End: 2019-02-20

## 2018-12-31 RX ORDER — CARBINOXAMINE MALEATE 4 MG/1
1 TABLET ORAL
Qty: 100 TAB | Refills: 2 | OUTPATIENT
Start: 2018-12-31

## 2019-01-08 ENCOUNTER — OFFICE VISIT (OUTPATIENT)
Dept: FAMILY MEDICINE CLINIC | Age: 79
End: 2019-01-08

## 2019-01-08 VITALS
WEIGHT: 198 LBS | OXYGEN SATURATION: 97 % | TEMPERATURE: 97.8 F | DIASTOLIC BLOOD PRESSURE: 62 MMHG | BODY MASS INDEX: 37.38 KG/M2 | HEART RATE: 51 BPM | SYSTOLIC BLOOD PRESSURE: 140 MMHG | HEIGHT: 61 IN | RESPIRATION RATE: 16 BRPM

## 2019-01-08 DIAGNOSIS — I10 ESSENTIAL HYPERTENSION: ICD-10-CM

## 2019-01-08 DIAGNOSIS — Z93.3 COLOSTOMY PRESENT (HCC): ICD-10-CM

## 2019-01-08 DIAGNOSIS — E11.22 CONTROLLED TYPE 2 DIABETES MELLITUS WITH CHRONIC KIDNEY DISEASE, WITHOUT LONG-TERM CURRENT USE OF INSULIN, UNSPECIFIED CKD STAGE (HCC): Primary | ICD-10-CM

## 2019-01-08 DIAGNOSIS — E55.9 HYPOVITAMINOSIS D: ICD-10-CM

## 2019-01-08 RX ORDER — AMLODIPINE BESYLATE 5 MG/1
5 TABLET ORAL DAILY
Qty: 90 TAB | Refills: 0 | Status: SHIPPED | OUTPATIENT
Start: 2019-01-08 | End: 2019-02-20

## 2019-01-08 NOTE — PATIENT INSTRUCTIONS

## 2019-01-08 NOTE — PROGRESS NOTES
Assessment/Plan:    *Diagnoses and all orders for this visit:    1. Controlled type 2 diabetes mellitus with chronic kidney disease, without long-term current use of insulin, unspecified CKD stage (HCC)  -     REFERRAL TO NEPHROLOGY  -     METABOLIC PANEL, BASIC; Future  -     HEMOGLOBIN A1C W/O EAG; Future    2. Colostomy present ([facility]    3. Essential hypertension  -     CBC WITH AUTOMATED DIFF; Future    4. Hypovitaminosis D  -     VITAMIN D, 25 HYDROXY; Future    Other orders  -     amLODIPine (NORVASC) 5 mg tablet; Take 1 Tab by mouth daily. Will stop Lasix now. Especially with her renal function. Will start Norvasc. Will await results. F/u 1 week for BP. Will consider dropping Spironolactone. The plan was discussed with the patient. The patient verbalized understanding and is in agreement with the plan. All medication potential side effects were discussed with the patient.    -------------------------------------------------------------------------------------------------------------------        Elie Sibley is a 69 y.o. female and presents with Medication Evaluation; Eye Swelling; and Allergies         Subjective:  Pt returns today to establish as a pt with me. We saw her on a sick visit. DM: will repeat BW. Has been well controlled in past.      Her labs were reviewed, has issue with kidneys, chronic kidney disease which the patient has no prior knowledge of. She says Dr. Chris Lechuga never mentioned this. She has never seen a nephrologist.  Has DM but it is well controlled. Has a colostomy bag in place 2/2 colon ca. Is managed by her specialist.    HTN: borderline. Has Nifedipine on her list but pt is not taking this and does not recall even being given this. She takes Welchol PRN just for her stomach when she has pain. ROS:  Review of Systems - Negative         The problem list was updated as a part of today's visit.   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 9) with comorbidity ([facility] E66.01    [facility] ([facility] Z93.3       The PSH, FH were reviewed. SH:  Social History     Tobacco Use    Smoking status: Never Smoker    Smokeless tobacco: Never Used   Substance Use Topics    Alcohol use: No    Drug use: No       Medications/Allergies:  Current Outpatient Medications on File Prior to Visit   Medication Sig Dispense Refill    spironolactone (ALDACTONE) 25 mg tablet Take 1 Tab by mouth two (2) times a day. 180 Tab 0    pioglitazone-metFORMIN (ACTOPLUS MET)  mg per tablet Take 1 Tab by mouth two (2) times daily (with meals). 180 Tab 0    telmisartan (MICARDIS) 80 mg tablet Take 1 Tab by mouth daily. 90 Tab 0    pantoprazole (PROTONIX) 40 mg tablet Take 1 Tab by mouth daily. 90 Tab 2    ergocalciferol (ERGOCALCIFEROL) 50,000 unit capsule Take 1 Cap by mouth every seven (7) days. 12 Cap 1    colesevelam (WELCHOL) 625 mg tablet Take 2 Tabs by mouth two (2) times daily (with meals). 360 Tab 3    albuterol (PROVENTIL HFA, VENTOLIN HFA, PROAIR HFA) 90 mcg/actuation inhaler Take 2 Puffs by inhalation every six (6) hours as needed for Wheezing. 1 Inhaler 3    Blood-Glucose Meter (ONETOUCH ULTRA2) monitoring kit Test daily 1 Kit 0    carbinoxamine maleate (PALGIC) 4 mg tab Take 1 Tab by mouth three (3) times daily as needed (congestion). 100 Tab 2     No current facility-administered medications on file prior to visit.          Allergies   Allergen Reactions    Adhesive Other (comments)     Skin tears    Amoxicillin Hives         Health Maintenance:   Health Maintenance Topic Date Due    Shingrix Vaccine Age 50> (1 of 2) 12/03/1991    HEMOGLOBIN A1C Q6M  03/07/2019    FOOT EXAM Q1  09/07/2019    MEDICARE YEARLY EXAM  09/08/2019    MICROALBUMIN Q1  09/21/2019    LIPID PANEL Q1  09/21/2019    GLAUCOMA SCREENING Q2Y  12/22/2019    EYE EXAM RETINAL OR DILATED  12/22/2019    DTaP/Tdap/Td series (2 - Td) 08/17/2025    Bone Densitometry (Dexa) Screening  Completed    Influenza Age 5 to Adult  Completed    Pneumococcal 65+ Low/Medium Risk  Addressed       Objective:  Visit Vitals  /62 (BP 1 Location: Left arm, BP Patient Position: Sitting)   Pulse (!) 51   Temp 97.8 °F (36.6 °C) (Oral)   Resp 16   Ht 5' 1\" (1.549 m)   Wt 198 lb (89.8 kg)   SpO2 97%   BMI 37.41 kg/m²          Nurses notes and VS reviewed. Physical Examination: General appearance - alert, well appearing, and in no distress  Chest - clear to auscultation, no wheezes, rales or rhonchi, symmetric air entry  Heart - normal rate, regular rhythm, normal S1, S2, no murmurs, rubs, clicks or gallops          Labwork and Ancillary Studies:    CBC w/Diff  Lab Results   Component Value Date/Time    WBC 5.2 09/21/2018 10:24 AM    HGB 9.2 (L) 09/21/2018 10:24 AM    PLATELET 190 24/20/2817 10:24 AM         Basic Metabolic Profile  Lab Results   Component Value Date/Time    Sodium 138 09/21/2018 10:24 AM    Potassium 5.9 (H) 09/21/2018 10:24 AM    Chloride 111 (H) 09/21/2018 10:24 AM    CO2 21 09/21/2018 10:24 AM    Anion gap 6 09/21/2018 10:24 AM    Glucose 80 09/21/2018 10:24 AM    BUN 61 (H) 09/21/2018 10:24 AM    Creatinine 2.42 (H) 09/21/2018 10:24 AM    BUN/Creatinine ratio 25 (H) 09/21/2018 10:24 AM    GFR est AA 24 (L) 09/21/2018 10:24 AM    GFR est non-AA 19 (L) 09/21/2018 10:24 AM    Calcium 9.0 09/21/2018 10:24 AM         LFT  Lab Results   Component Value Date/Time    ALT (SGPT) 16 09/21/2018 10:24 AM    AST (SGOT) 17 09/21/2018 10:24 AM    Alk.  phosphatase 81 09/21/2018 10:24 AM    Bilirubin, total 0.4 09/21/2018 10:24 AM         Cholesterol  Lab Results   Component Value Date/Time    Cholesterol, total 172 09/21/2018 10:24 AM    HDL Cholesterol 75 (H) 09/21/2018 10:24 AM    LDL, calculated 83 09/21/2018 10:24 AM    Triglyceride 70 09/21/2018 10:24 AM    CHOL/HDL Ratio 2.3 09/21/2018 10:24 AM

## 2019-01-08 NOTE — PROGRESS NOTES
Valentin Harper is a 69 y.o. female (: 1940) presenting to address:    Chief Complaint   Patient presents with    Medication Evaluation    Eye Swelling    Allergies       Vitals:    19 1146   BP: 140/62   Pulse: (!) 51   Resp: 16   Temp: 97.8 °F (36.6 °C)   TempSrc: Oral   SpO2: 97%   Weight: 198 lb (89.8 kg)   Height: 5' 1\" (1.549 m)   PainSc:   0 - No pain       Hearing/Vision:   No exam data present    Learning Assessment:     Learning Assessment 2015   PRIMARY LEARNER Patient   HIGHEST LEVEL OF EDUCATION - PRIMARY LEARNER  DID NOT GRADUATE HIGH SCHOOL   BARRIERS PRIMARY LEARNER NONE   CO-LEARNER CAREGIVER No   PRIMARY LANGUAGE ENGLISH    NEED No   LEARNER PREFERENCE PRIMARY DEMONSTRATION   ANSWERED BY Patient   RELATIONSHIP SELF     Depression Screening:     PHQ over the last two weeks 2018   Little interest or pleasure in doing things Not at all   Feeling down, depressed, irritable, or hopeless Not at all   Total Score PHQ 2 0     Fall Risk Assessment:     Fall Risk Assessment, last 12 mths 2018   Able to walk? Yes   Fall in past 12 months? No   Fall with injury? -   Number of falls in past 12 months -   Fall Risk Score -     Abuse Screening:     Abuse Screening Questionnaire 2018   Do you ever feel afraid of your partner? N   Are you in a relationship with someone who physically or mentally threatens you? N   Is it safe for you to go home? Y     Coordination of Care Questionaire:   1. Have you been to the ER, urgent care clinic since your last visit? Hospitalized since your last visit? NO    2. Have you seen or consulted any other health care providers outside of the [address] [place] [address] since your last visit? Include any pap smears or colon screening. NO    Advanced Directive:   1. Do you have an Advanced Directive? NO    2. Would you like information on Advanced Directives?  NO

## 2019-01-16 DIAGNOSIS — Z12.39 BREAST CANCER SCREENING: ICD-10-CM

## 2019-02-08 ENCOUNTER — HOSPITAL ENCOUNTER (OUTPATIENT)
Dept: LAB | Age: 79
Discharge: HOME | End: 2019-02-08
Payer: MEDICARE

## 2019-02-08 DIAGNOSIS — E11.22 CONTROLLED TYPE 2 DIABETES MELLITUS WITH CHRONIC KIDNEY DISEASE, WITHOUT LONG-TERM CURRENT USE OF INSULIN, UNSPECIFIED CKD STAGE (HCC): ICD-10-CM

## 2019-02-08 DIAGNOSIS — E55.9 HYPOVITAMINOSIS D: ICD-10-CM

## 2019-02-08 DIAGNOSIS — I10 ESSENTIAL HYPERTENSION: ICD-10-CM

## 2019-02-08 LAB
ANION GAP SERPL CALC-SCNC: 8 MMOL/L (ref 3–18)
BASOPHILS # BLD: 0 K/UL (ref 0–0.1)
BASOPHILS NFR BLD: 0 % (ref 0–2)
BUN SERPL-MCNC: 37 MG/DL (ref 7–18)
BUN/CREAT SERPL: 21 (ref 12–20)
CALCIUM SERPL-MCNC: 9.1 MG/DL (ref 8.5–10.1)
CHLORIDE SERPL-SCNC: 111 MMOL/L (ref 100–108)
CO2 SERPL-SCNC: 23 MMOL/L (ref 21–32)
CREAT SERPL-MCNC: 1.75 MG/DL (ref 0.6–1.3)
DIFFERENTIAL METHOD BLD: ABNORMAL
EOSINOPHIL # BLD: 0 K/UL (ref 0–0.4)
EOSINOPHIL NFR BLD: 1 % (ref 0–5)
ERYTHROCYTE [DISTWIDTH] IN BLOOD BY AUTOMATED COUNT: 14.9 % (ref 11.6–14.5)
GLUCOSE SERPL-MCNC: 87 MG/DL (ref 74–99)
HBA1C MFR BLD: 5.4 % (ref 4.2–5.6)
HCT VFR BLD AUTO: 30.5 % (ref 35–45)
HGB BLD-MCNC: 9.6 G/DL (ref 12–16)
LYMPHOCYTES # BLD: 1.8 K/UL (ref 0.9–3.6)
LYMPHOCYTES NFR BLD: 41 % (ref 21–52)
MCH RBC QN AUTO: 30.6 PG (ref 24–34)
MCHC RBC AUTO-ENTMCNC: 31.5 G/DL (ref 31–37)
MCV RBC AUTO: 97.1 FL (ref 74–97)
MONOCYTES # BLD: 0.4 K/UL (ref 0.05–1.2)
MONOCYTES NFR BLD: 8 % (ref 3–10)
NEUTS SEG # BLD: 2.2 K/UL (ref 1.8–8)
NEUTS SEG NFR BLD: 50 % (ref 40–73)
PLATELET # BLD AUTO: 182 K/UL (ref 135–420)
PMV BLD AUTO: 11 FL (ref 9.2–11.8)
POTASSIUM SERPL-SCNC: 5.1 MMOL/L (ref 3.5–5.5)
RBC # BLD AUTO: 3.14 M/UL (ref 4.2–5.3)
SODIUM SERPL-SCNC: 142 MMOL/L (ref 136–145)
WBC # BLD AUTO: 4.4 K/UL (ref 4.6–13.2)

## 2019-02-08 PROCEDURE — 80048 BASIC METABOLIC PNL TOTAL CA: CPT

## 2019-02-08 PROCEDURE — 85025 COMPLETE CBC W/AUTO DIFF WBC: CPT

## 2019-02-08 PROCEDURE — 36415 COLL VENOUS BLD VENIPUNCTURE: CPT

## 2019-02-08 PROCEDURE — 82306 VITAMIN D 25 HYDROXY: CPT

## 2019-02-08 PROCEDURE — 83036 HEMOGLOBIN GLYCOSYLATED A1C: CPT

## 2019-02-09 LAB — 25(OH)D3 SERPL-MCNC: 28.1 NG/ML (ref 30–100)

## 2019-02-15 ENCOUNTER — OFFICE VISIT (OUTPATIENT)
Dept: FAMILY MEDICINE CLINIC | Age: 79
End: 2019-02-15

## 2019-02-15 VITALS
DIASTOLIC BLOOD PRESSURE: 84 MMHG | TEMPERATURE: 98.8 F | SYSTOLIC BLOOD PRESSURE: 146 MMHG | BODY MASS INDEX: 37.76 KG/M2 | OXYGEN SATURATION: 99 % | RESPIRATION RATE: 18 BRPM | WEIGHT: 200 LBS | HEIGHT: 61 IN | HEART RATE: 61 BPM

## 2019-02-15 DIAGNOSIS — K21.9 GASTROESOPHAGEAL REFLUX DISEASE WITHOUT ESOPHAGITIS: ICD-10-CM

## 2019-02-15 DIAGNOSIS — E11.22 CONTROLLED TYPE 2 DIABETES MELLITUS WITH CHRONIC KIDNEY DISEASE, WITHOUT LONG-TERM CURRENT USE OF INSULIN, UNSPECIFIED CKD STAGE (HCC): ICD-10-CM

## 2019-02-15 DIAGNOSIS — I10 ESSENTIAL HYPERTENSION: Primary | ICD-10-CM

## 2019-02-15 RX ORDER — GLIPIZIDE 5 MG/1
5 TABLET ORAL 2 TIMES DAILY
Qty: 60 TAB | Refills: 0 | Status: SHIPPED | OUTPATIENT
Start: 2019-02-15 | End: 2019-02-20

## 2019-02-15 NOTE — PROGRESS NOTES
Assessment/Plan:    *Diagnoses and all orders for this visit:    1. Essential hypertension    2. Controlled type 2 diabetes mellitus with chronic kidney disease, without long-term current use of insulin, unspecified CKD stage ([facility]    3. Gastroesophageal reflux disease without esophagitis    Other orders  -     glipiZIDE (GLUCOTROL) 5 mg tablet; Take 1 Tab by mouth two (2) times a day. Will start Glipizide. She will check for Norvasc at home and call us. If taking it, will monitor BP for now until next visit. F/u 3-4 weeks. The plan was discussed with the patient. The patient verbalized understanding and is in agreement with the plan. All medication potential side effects were discussed with the patient.    -------------------------------------------------------------------------------------------------------------------        Elie Sibley is a 69 y.o. female and presents with Hypertension; Nasal Congestion; Cough; and Ear Pain          Subjective:  Pt here for f/u of a few issues. Has been nursing a cold the last 3 days but otherwise, is fine. HTN: we stopped the Lasix, we added Norvasc 5 mg. She is not sure whether she actually received and started the Norvasc. BP needs better control with her renal disease. She saw Dr. Joaquina Blevins and he has advised us to stop the Actoplus. He also told her to stop Protonix. DM: will need to stop actoplus. Pt understands why. GERD: she has symptoms at times but was used to taking Protonix daily so not sure how she would do with just managing her diet. She could possibly do well w/o meds. ROS:  Review of Systems - Negative except as above        The problem list was updated as a part of today's visit.   Patient Active Problem List   Diagnosis Code    Iron deficiency anemia D50.9    Type 2 diabetes mellitus without complication, without long-term current use of insulin ([facility] E11.9    Blood in stool K92.1    Secondary localized osteoarthrosis, lower leg M17.5    Unspecified otitis media H66.90    BPPV (benign paroxysmal positional vertigo) H81.10    Type 2 diabetes with nephropathy (HCC) E11.21    Severe obesity (BMI 35.0-39. 9) with comorbidity ([facility] E66.01    Colostomy present ([facility] Z93.3       The PSH, FH were reviewed. SH:  Social History     Tobacco Use    Smoking status: Never Smoker    Smokeless tobacco: Never Used   Substance Use Topics    Alcohol use: No    Drug use: No       Medications/Allergies:  Current Outpatient Medications on File Prior to Visit   Medication Sig Dispense Refill    iron bg,ps/vitC/B12/FA/calcium (NANCY FORTE PO) Take  by mouth.  amLODIPine (NORVASC) 5 mg tablet Take 1 Tab by mouth daily. 90 Tab 0    spironolactone (ALDACTONE) 25 mg tablet Take 1 Tab by mouth two (2) times a day. 180 Tab 0    telmisartan (MICARDIS) 80 mg tablet Take 1 Tab by mouth daily. 90 Tab 0    ergocalciferol (ERGOCALCIFEROL) 50,000 unit capsule Take 1 Cap by mouth every seven (7) days. 12 Cap 1    colesevelam (WELCHOL) 625 mg tablet Take 2 Tabs by mouth two (2) times daily (with meals). 360 Tab 3    albuterol (PROVENTIL HFA, VENTOLIN HFA, PROAIR HFA) 90 mcg/actuation inhaler Take 2 Puffs by inhalation every six (6) hours as needed for Wheezing. 1 Inhaler 3    Blood-Glucose Meter (ONETOUCH ULTRA2) monitoring kit Test daily 1 Kit 0    carbinoxamine maleate (PALGIC) 4 mg tab Take 1 Tab by mouth three (3) times daily as needed (congestion). 100 Tab 2    pioglitazone-metFORMIN (ACTOPLUS MET)  mg per tablet Take 1 Tab by mouth two (2) times daily (with meals). 180 Tab 0    pantoprazole (PROTONIX) 40 mg tablet Take 1 Tab by mouth daily. 90 Tab 2     No current facility-administered medications on file prior to visit.          Allergies   Allergen Reactions    Adhesive Other (comments)     Skin tears    Amoxicillin Hives         Health Maintenance:   Health Maintenance   Topic Date Due    Shingrix Vaccine Age 50> (1 of 2) 12/03/1991    HEMOGLOBIN A1C Q6M  08/08/2019    FOOT EXAM Q1  09/07/2019    MEDICARE YEARLY EXAM  09/08/2019    MICROALBUMIN Q1  09/21/2019    LIPID PANEL Q1  09/21/2019    GLAUCOMA SCREENING Q2Y  12/22/2019    EYE EXAM RETINAL OR DILATED  12/22/2019    DTaP/Tdap/Td series (2 - Td) 08/17/2025    Bone Densitometry (Dexa) Screening  Completed    Influenza Age 5 to Adult  Completed    Pneumococcal 65+ Low/Medium Risk  Addressed       Objective:  Visit Vitals  /84 (BP 1 Location: Left arm, BP Patient Position: Sitting)   Pulse 61   Temp 98.8 °F (37.1 °C) (Oral)   Resp 18   Ht 5' 1\" (1.549 m)   Wt 200 lb (90.7 kg)   SpO2 99%   BMI 37.79 kg/m²          Nurses notes and VS reviewed. Physical Examination: General appearance - alert, well appearing, and in no distress  Chest - clear to auscultation, no wheezes, rales or rhonchi, symmetric air entry  Heart - normal rate, regular rhythm, normal S1, S2, no murmurs, rubs, clicks or gallops        Labwork and Ancillary Studies:    CBC w/Diff  Lab Results   Component Value Date/Time    WBC 4.4 (L) 02/08/2019 11:40 AM    HGB 9.6 (L) 02/08/2019 11:40 AM    PLATELET 737 74/55/5954 11:40 AM         Basic Metabolic Profile  Lab Results   Component Value Date/Time    Sodium 142 02/08/2019 11:40 AM    Potassium 5.1 02/08/2019 11:40 AM    Chloride 111 (H) 02/08/2019 11:40 AM    CO2 23 02/08/2019 11:40 AM    Anion gap 8 02/08/2019 11:40 AM    Glucose 87 02/08/2019 11:40 AM    BUN 37 (H) 02/08/2019 11:40 AM    Creatinine 1.75 (H) 02/08/2019 11:40 AM    BUN/Creatinine ratio 21 (H) 02/08/2019 11:40 AM    GFR est AA 34 (L) 02/08/2019 11:40 AM    GFR est non-AA 28 (L) 02/08/2019 11:40 AM    Calcium 9.1 02/08/2019 11:40 AM         LFT  Lab Results   Component Value Date/Time    ALT (SGPT) 16 09/21/2018 10:24 AM    AST (SGOT) 17 09/21/2018 10:24 AM    Alk.  phosphatase 81 09/21/2018 10:24 AM    Bilirubin, total 0.4 09/21/2018 10:24 AM         Cholesterol  Lab Results Component Value Date/Time    Cholesterol, total 172 09/21/2018 10:24 AM    HDL Cholesterol 75 (H) 09/21/2018 10:24 AM    LDL, calculated 83 09/21/2018 10:24 AM    Triglyceride 70 09/21/2018 10:24 AM    CHOL/HDL Ratio 2.3 09/21/2018 10:24 AM

## 2019-02-15 NOTE — PATIENT INSTRUCTIONS

## 2019-02-15 NOTE — PROGRESS NOTES
Kay Royal is a 69 y.o. female (: 1940) presenting to address:    Chief Complaint   Patient presents with    Hypertension    Nasal Congestion    Cough    Ear Pain       Vitals:    02/15/19 0937   BP: 152/60   Pulse: 61   Resp: 18   Temp: 98.8 °F (37.1 °C)   TempSrc: Oral   SpO2: 99%   Weight: 200 lb (90.7 kg)   Height: 5' 1\" (1.549 m)   PainSc:   0 - No pain       Hearing/Vision:   No exam data present    Learning Assessment:     Learning Assessment 2015   PRIMARY LEARNER Patient   HIGHEST LEVEL OF EDUCATION - PRIMARY LEARNER  DID NOT GRADUATE HIGH SCHOOL   BARRIERS PRIMARY LEARNER NONE   CO-LEARNER CAREGIVER No   PRIMARY LANGUAGE ENGLISH    NEED No   LEARNER PREFERENCE PRIMARY DEMONSTRATION   ANSWERED BY Patient   RELATIONSHIP SELF     Depression Screening:     3 most recent PHQ Screens 2018   Little interest or pleasure in doing things Not at all   Feeling down, depressed, irritable, or hopeless Not at all   Total Score PHQ 2 0     Fall Risk Assessment:     Fall Risk Assessment, last 12 mths 2018   Able to walk? Yes   Fall in past 12 months? No   Fall with injury? -   Number of falls in past 12 months -   Fall Risk Score -     Abuse Screening:     Abuse Screening Questionnaire 2018   Do you ever feel afraid of your partner? N   Are you in a relationship with someone who physically or mentally threatens you? N   Is it safe for you to go home? Y     Coordination of Care Questionaire:   1. Have you been to the ER, urgent care clinic since your last visit? Hospitalized since your last visit? NO    2. Have you seen or consulted any other health care providers outside of the [address] [place] [address] since your last visit? Include any pap smears or colon screening. YES kidney Dr told her to stop actos plus met and protonix     Advanced Directive:   1. Do you have an Advanced Directive? NO    2. Would you like information on Advanced Directives?  NO

## 2019-02-20 ENCOUNTER — PATIENT OUTREACH (OUTPATIENT)
Dept: FAMILY MEDICINE CLINIC | Age: 79
End: 2019-02-20

## 2019-02-20 ENCOUNTER — TELEPHONE (OUTPATIENT)
Dept: FAMILY MEDICINE CLINIC | Age: 79
End: 2019-02-20

## 2019-02-20 DIAGNOSIS — E16.2 HYPOGLYCEMIA: Primary | ICD-10-CM

## 2019-02-20 DIAGNOSIS — E11.9 TYPE 2 DIABETES MELLITUS WITHOUT COMPLICATION, WITHOUT LONG-TERM CURRENT USE OF INSULIN (HCC): Chronic | ICD-10-CM

## 2019-02-20 DIAGNOSIS — Z91.89: ICD-10-CM

## 2019-02-20 PROBLEM — D63.8 ANEMIA OF CHRONIC DISEASE: Status: ACTIVE | Noted: 2019-02-16

## 2019-02-20 PROBLEM — K21.9 GERD WITHOUT ESOPHAGITIS: Status: ACTIVE | Noted: 2019-02-16

## 2019-02-20 PROBLEM — N17.9 ACUTE KIDNEY INJURY (HCC): Status: ACTIVE | Noted: 2019-02-16

## 2019-02-20 RX ORDER — AMLODIPINE BESYLATE 5 MG/1
10 TABLET ORAL DAILY
COMMUNITY
End: 2019-03-21

## 2019-02-20 RX ORDER — BENZONATATE 100 MG/1
100 CAPSULE ORAL
COMMUNITY
Start: 2019-02-19 | End: 2019-03-18

## 2019-02-20 RX ORDER — ISOPROPYL ALCOHOL 70 ML/100ML
SWAB TOPICAL
Qty: 300 PAD | Refills: 2 | Status: SHIPPED | OUTPATIENT
Start: 2019-02-20

## 2019-02-20 RX ORDER — AMLODIPINE BESYLATE 5 MG/1
2 TABLET ORAL 2 TIMES DAILY
COMMUNITY
Start: 2019-02-19 | End: 2019-02-20 | Stop reason: DRUGHIGH

## 2019-02-20 RX ORDER — LANCETS 28 GAUGE
EACH MISCELLANEOUS
Qty: 300 LANCET | Refills: 2 | Status: SHIPPED | OUTPATIENT
Start: 2019-02-20 | End: 2020-03-18 | Stop reason: ALTCHOICE

## 2019-02-20 RX ORDER — ESOMEPRAZOLE MAGNESIUM 40 MG/1
40 CAPSULE, DELAYED RELEASE ORAL DAILY
COMMUNITY
End: 2019-02-25

## 2019-02-20 RX ORDER — INSULIN PUMP SYRINGE, 3 ML
EACH MISCELLANEOUS
Qty: 1 KIT | Refills: 0 | Status: SHIPPED | OUTPATIENT
Start: 2019-02-20

## 2019-02-20 NOTE — PROGRESS NOTES
[facility] Discharge Follow-Up Date/Time:  2019 3:59 PM 
 
Patient was admitted to [facility] on 19 and discharged on 2019 for Hypoglycemia Attack. The physician discharge summary was available at the time of outreach. Patient was contacted within 1 business days of discharge. JESSICA appointment is scheduled for 2019 @ 1:45 PM with Dr Naty Bergman. Top Challenges reviewed with the provider 1. Patient has been taken off all of DM medications 2. Started Norvasc 5 mg tab take 2 daily. 3. Instructed to take blood sugars 2 times daily before Breakfast and dinner until seen by you. Today it was 125 but after breakfast and yesterday evening 144. Method of communication with provider :chart routing Inpatient RRAT score: . Medium Risk 25 Total Score 18 Charlson Comorbidity Score (Age + Comorbid Conditions) Criteria that do not apply:  
 Has Seen PCP in Last 6 Months (Yes=3, No=0) . Living with Significant Other. Assisted Living. LTAC. SNF. or  
Rehab Patient Length of Stay (>5 days = 3) IP Visits Last 12 Months (1-3=4, 4=9, >4=11) Pt. Coverage (Medicare=5 , Medicaid, or Self-Pay=4) Was this a readmission? no  
Patient stated reason for the readmission: N/A Nurse Navigator (NN) contacted the patient by telephone to perform post hospital discharge assessment. Verified name and  with patient as identifiers. Provided introduction to self, and explanation of the Nurse Navigator role. Reviewed discharge instructions and red flags with patient who verbalized understanding. Patient given an opportunity to ask questions and does not have any further questions or concerns at this time. The patient agrees to contact the PCP office for questions related to their healthcare. NN provided contact information for future reference. Disease Specific:   N/A Summary of patient's top problems: 1. Patient has been taken off all of DM medications 2. Started Norvasc 5 mg tab take 2 daily. 3. Instructed to take blood sugars 2 times daily before Breakfast and dinner until seen by you. Today it was 125 but after breakfast and yesterday evening 144. Home Health orders at discharge: None Home Health company: N/A Date of initial visit: N/A Durable Medical Equipment ordered/company: NONE Durable Medical Equipment received: N/A Barriers to care? NONE Advance Care Planning:  
Does patient have an Advance Directive:  not on file Medication(s):  
 
New Medications at Discharge:  
Norvasc 5 mg tabs take 2 tabs daily TESSALON) 100 mg , 1 every 8 hours as needed for cough Patient did not fill the glucose tabs Changed Medications at Discharge: NONE Discontinued Medications at Discharge: STOP taking these medications  
 
furosemide (LASIX) 40 mg PO TABS Comments:  
Reason for Stopping:  
 
glipiZIDE (GLUCOTROL) 5 mg PO TABS Comments:  
Reason for Stopping:  
 
naproxen (NAPROSYN) 500 mg PO TABS Comments:  
Reason for Stopping:  
 
Pioglitazone-Metformin  mg PO TABS Comments:  
Reason for Stopping:  
 
spironolactone (ALDACTONE) 25 mg PO TABS Comments:  
Reason for Stopping:  
 
telmisartan (MICARDIS) 80 mg PO TABS Comments:  
Reason for Stopping:  
 
Medication reconciliation was performed with patient, who verbalizes understanding of administration of home medications. There were barriers to obtaining medications identified at this time. Referral to Pharm D needed: no  
 
Current Outpatient Medications Medication Sig  
 benzonatate (TESSALON) 100 mg capsule Take 100 mg by mouth every eight (8) hours as needed.  NORVASC 5 mg tablet Take 10 mg by mouth daily.  esomeprazole (NEXIUM) 40 mg capsule Take 40 mg by mouth daily.  iron bg,ps/vitC/B12/FA/calcium (NANCY FORTE PO) Take  by mouth.   
 ergocalciferol (ERGOCALCIFEROL) 50,000 unit capsule Take 1 Cap by mouth every seven (7) days.  colesevelam (WELCHOL) 625 mg tablet Take 2 Tabs by mouth two (2) times daily (with meals).  albuterol (PROVENTIL HFA, VENTOLIN HFA, PROAIR HFA) 90 mcg/actuation inhaler Take 2 Puffs by inhalation every six (6) hours as needed for Wheezing.  Blood-Glucose Meter (ONETOUCH ULTRA2) monitoring kit Test daily  carbinoxamine maleate (PALGIC) 4 mg tab Take 1 Tab by mouth three (3) times daily as needed (congestion). No current facility-administered medications for this visit. Medications Discontinued During This Encounter Medication Reason  amLODIPine (NORVASC) 5 mg tablet Discontinued at Discharge  glipiZIDE (GLUCOTROL) 5 mg tablet Discontinued at Discharge  pioglitazone-metFORMIN (ACTOPLUS MET)  mg per tablet Discontinued at Discharge  spironolactone (ALDACTONE) 25 mg tablet Discontinued at Discharge  telmisartan (MICARDIS) 80 mg tablet Discontinued at Discharge  pantoprazole (PROTONIX) 40 mg tablet Discontinued at Discharge  amLODIPine (NORVASC) 5 mg tablet Dose Adjustment [facility] follow up appointment(s):  
Future Appointments Date Time Provider Tavo Hughesi 2/25/2019  1:45 PM Katelyn Guido MD [facility], [place]  
3/18/2019  1:30 PM Katelyn Guido MD [facility], [place] [facility] follow up appointment(s): Dr Shelley Sibley, Nephrology in 2 weeks [facility]:  n/a  
 
 
Goals Diabetes  Patient verbalizes understanding of self -management goals of living with Diabetes.  Reduce risk of comorbid complications related to diabetes (renal disease, heart disease, amputation, and retinopathy). Post Hospitalization  Knowledge and adherence of prescribed medication (ie. action, side effects, missed dose, etc.). amLODIPine (NORVASC) 5 mg PO TABS   Take 2 Tabs by Mouth Once a Day  
 
benzonatate (TESSALON) 100 mg PO CAPS   Take 1 Cap by Mouth Every 8 Hours As Needed for Cough.  Prevent complications post hospitalization.  Understands red flags post discharge.

## 2019-02-20 NOTE — TELEPHONE ENCOUNTER
Called patients son back and spoke with him, and he stated that [facility] did not send over complete orders to the pharmacy. The patient is supposed to be checking her blood sugar three times a day. Requesting that Dr. Leonard Brooks send over prescriptions for the meter, test strips, alcohol swabs and lancets.

## 2019-02-20 NOTE — Clinical Note
Patient has JESSICA f/u appointment for 2/25/2019 @ 1:45. They took her off all of her medications ( Blood pressure meds as well)She is on Norvasc 5 mg tabs take 2 tabs daily. Need update sript to pharmacy concerning her Glucose meter and test strips. They did send you a message today via  pharmacy could not understand what the srcipt said. I told patient to take her blood sugar before breakfast and dinner until seen by you on Monday.

## 2019-02-20 NOTE — TELEPHONE ENCOUNTER
Patient needs a prescription for a Glucometer Freestyle, test strips, alcohol swabs, lancets. Patient was seen at [facility], pharmacy is unable to read prescription. [address]

## 2019-02-25 ENCOUNTER — OFFICE VISIT (OUTPATIENT)
Dept: FAMILY MEDICINE CLINIC | Age: 79
End: 2019-02-25

## 2019-02-25 VITALS
BODY MASS INDEX: 38.33 KG/M2 | HEIGHT: 61 IN | SYSTOLIC BLOOD PRESSURE: 145 MMHG | HEART RATE: 71 BPM | TEMPERATURE: 99.5 F | WEIGHT: 203 LBS | OXYGEN SATURATION: 96 % | DIASTOLIC BLOOD PRESSURE: 60 MMHG | RESPIRATION RATE: 22 BRPM

## 2019-02-25 DIAGNOSIS — I10 ESSENTIAL HYPERTENSION: ICD-10-CM

## 2019-02-25 DIAGNOSIS — E11.21 TYPE 2 DIABETES WITH NEPHROPATHY (HCC): Primary | ICD-10-CM

## 2019-02-25 RX ORDER — ALBUTEROL SULFATE 90 UG/1
2 AEROSOL, METERED RESPIRATORY (INHALATION)
Qty: 1 INHALER | Refills: 3 | Status: SHIPPED | OUTPATIENT
Start: 2019-02-25 | End: 2020-01-06 | Stop reason: SDUPTHER

## 2019-02-25 RX ORDER — METHYLPREDNISOLONE 4 MG/1
TABLET ORAL
Qty: 1 DOSE PACK | Refills: 0 | Status: SHIPPED | OUTPATIENT
Start: 2019-02-25 | End: 2019-03-18

## 2019-02-25 RX ORDER — TELMISARTAN 80 MG/1
80 TABLET ORAL DAILY
COMMUNITY
End: 2019-03-18

## 2019-02-25 NOTE — PROGRESS NOTES
Assessment/Plan: *Diagnoses and all orders for this visit: 
 
1. Type 2 diabetes with nephropathy (HCC) 2. Essential hypertension Other orders 
-     albuterol (PROVENTIL HFA, VENTOLIN HFA, PROAIR HFA) 90 mcg/actuation inhaler; Take 2 Puffs by inhalation every six (6) hours as needed for Wheezing. 
-     methylPREDNISolone (MEDROL DOSEPACK) 4 mg tablet; Use as directed on package. Will restart her Micardis, as this was fine with nephrology when she saw them. Will go without any Tx for her DM for now. Will see how she does, she will keep a BG and BP logs. Will f/u in 1 month with both her logs. The plan was discussed with the patient. The patient verbalized understanding and is in agreement with the plan. All medication potential side effects were discussed with the patient. 
 
------------------------------------------------------------------------------------------------------------------- Manny Vee is a 69 y.o. female and presents with Hospital Follow Up and Medication Refill (hospital said they would give glucose 4 gram chews but no script was written ) Subjective: 
Pt here for hospital f/u. She was admitted from 2/16 to 2/19/19 at [facility]. A patient outreach was made on 2/20/19 by MARYANN Seals. Pt experienced a hypoglycemic episode after we had to change her DM medication from actoplus met and chose glipizide to replace it. Pt claims to have eaten well when she was taking the glipizide. HTN: they took her off several meds including BP. She is only taking Norvasc 10 mg but readings are not quite at goal.. ROS: 
Review of Systems - Negative except as above The problem list was updated as a part of today's visit. Patient Active Problem List  
Diagnosis Code  Iron deficiency anemia D50.9  Type 2 diabetes mellitus without complication, without long-term current use of insulin (HCC) E11.9  Blood in stool K92.1  Secondary localized osteoarthrosis, lower leg M17.5  Unspecified otitis media H66.90  BPPV (benign paroxysmal positional vertigo) H81.10  Type 2 diabetes with nephropathy (HCC) E11.21  
 Severe obesity (BMI 35.0-39. 9) with comorbidity ([facility] E66.01  
 Colostomy present ([facility] Z93.3  Acute kidney injury ([facility] N17.9  Anemia of chronic disease D63.8  GERD without esophagitis K21.9  Hypoglycemia E16.2  
 Essential hypertension I10 The PSH, FH were reviewed. SH: Social History Tobacco Use  Smoking status: Never Smoker  Smokeless tobacco: Never Used Substance Use Topics  Alcohol use: No  
 Drug use: No  
 
 
Medications/Allergies: 
Current Outpatient Medications on File Prior to Visit Medication Sig Dispense Refill  telmisartan (MICARDIS) 80 mg tablet Take 80 mg by mouth daily.  benzonatate (TESSALON) 100 mg capsule Take 100 mg by mouth every eight (8) hours as needed.  NORVASC 5 mg tablet Take 10 mg by mouth daily.  Blood-Glucose Meter (FREESTYLE LITE METER) monitoring kit For three times a day testing    DX: Z91.89;  E16.2, 1 Kit 0  
 glucose blood VI test strips (FREESTYLE LITE STRIPS) strip For three times a day testing    DX: Z91.89;  E16.2, 300 Strip 2  
 lancets (FREESTYLE LANCETS) 28 gauge misc For three times a day testing    DX: Z91.89;  E16.2, 300 Lancet 2  
 alcohol swabs (ALCOHOL PADS) padm For three times a day testing  DX: Z91.89;  E16.2, 300 Pad 2  
 iron bg,ps/vitC/B12/FA/calcium (NANCY FORTE PO) Take  by mouth.  ergocalciferol (ERGOCALCIFEROL) 50,000 unit capsule Take 1 Cap by mouth every seven (7) days. 12 Cap 1  colesevelam (WELCHOL) 625 mg tablet Take 2 Tabs by mouth two (2) times daily (with meals). 360 Tab 3 No current facility-administered medications on file prior to visit. Allergies Allergen Reactions  Adhesive Other (comments) Skin tears  Amoxicillin Hives Health Maintenance: Health Maintenance Topic Date Due  Shingrix Vaccine Age 50> (1 of 2) 12/03/1991  
 HEMOGLOBIN A1C Q6M  08/08/2019  
 FOOT EXAM Q1  09/07/2019  MEDICARE YEARLY EXAM  09/08/2019  MICROALBUMIN Q1  09/21/2019  LIPID PANEL Q1  09/21/2019  GLAUCOMA SCREENING Q2Y  12/22/2019  
 EYE EXAM RETINAL OR DILATED  12/22/2019  
 DTaP/Tdap/Td series (2 - Td) 08/17/2025  Bone Densitometry (Dexa) Screening  Completed  Influenza Age 5 to Adult  Completed  Pneumococcal 65+ Low/Medium Risk  Addressed Objective: 
Visit Vitals /60 (BP 1 Location: Left arm, BP Patient Position: Sitting) Pulse 71 Temp 99.5 °F (37.5 °C) (Oral) Resp 22 Ht 5' 1\" (1.549 m) Wt 203 lb (92.1 kg) SpO2 96% BMI 38.36 kg/m² Nurses notes and VS reviewed. Physical Examination: General appearance - alert, well appearing, and in no distress Chest - clear to auscultation, no wheezes, rales or rhonchi, symmetric air entry Heart - normal rate, regular rhythm, normal S1, S2, no murmurs, rubs, clicks or gallops P Labwork and Ancillary Studies: CBC w/Diff Lab Results Component Value Date/Time WBC 4.4 (L) 02/08/2019 11:40 AM  
 HGB 9.6 (L) 02/08/2019 11:40 AM  
 PLATELET 963 25/95/2075 11:40 AM  
  
 
 Basic Metabolic Profile Lab Results Component Value Date/Time Sodium 142 02/08/2019 11:40 AM  
 Potassium 5.1 02/08/2019 11:40 AM  
 Chloride 111 (H) 02/08/2019 11:40 AM  
 CO2 23 02/08/2019 11:40 AM  
 Anion gap 8 02/08/2019 11:40 AM  
 Glucose 87 02/08/2019 11:40 AM  
 BUN 37 (H) 02/08/2019 11:40 AM  
 Creatinine 1.75 (H) 02/08/2019 11:40 AM  
 BUN/Creatinine ratio 21 (H) 02/08/2019 11:40 AM  
 GFR est AA 34 (L) 02/08/2019 11:40 AM  
 GFR est non-AA 28 (L) 02/08/2019 11:40 AM  
 Calcium 9.1 02/08/2019 11:40 AM  
  
  
LFT Lab Results Component Value Date/Time ALT (SGPT) 16 09/21/2018 10:24 AM  
 AST (SGOT) 17 09/21/2018 10:24 AM  
 Alk.  phosphatase 81 09/21/2018 10:24 AM  
 Bilirubin, total 0.4 09/21/2018 10:24 AM  
 
 
 
Cholesterol Lab Results Component Value Date/Time  Cholesterol, total 172 09/21/2018 10:24 AM  
 HDL Cholesterol 75 (H) 09/21/2018 10:24 AM  
 LDL, calculated 83 09/21/2018 10:24 AM  
 Triglyceride 70 09/21/2018 10:24 AM  
 CHOL/HDL Ratio 2.3 09/21/2018 10:24 AM

## 2019-02-25 NOTE — PROGRESS NOTES
Tran Hunt is a 69 y.o. female (: 1940) presenting to address: Chief Complaint Patient presents with  
[facility] Follow Up  
 Medication Refill  
  hospital said they would give glucose 4 gram chews but no script was written Vitals:  
 19 1403 BP: 150/60 Pulse: 71 Resp: 22 Temp: 99.5 °F (37.5 °C) TempSrc: Oral  
SpO2: 96% Weight: 203 lb (92.1 kg) Height: 5' 1\" (1.549 m) PainSc:   0 - No pain Hearing/Vision: No exam data present Learning Assessment:  
 
Learning Assessment 2015 PRIMARY LEARNER Patient HIGHEST LEVEL OF EDUCATION - PRIMARY LEARNER  DID NOT GRADUATE HIGH SCHOOL  
BARRIERS PRIMARY LEARNER NONE  
CO-LEARNER CAREGIVER No  
PRIMARY LANGUAGE ENGLISH  NEED No  
LEARNER PREFERENCE PRIMARY DEMONSTRATION  
ANSWERED BY Patient RELATIONSHIP SELF Depression Screening:  
 
3 most recent PHQ Screens 2019 Little interest or pleasure in doing things Not at all Feeling down, depressed, irritable, or hopeless Not at all Total Score PHQ 2 0 Fall Risk Assessment:  
 
Fall Risk Assessment, last 12 mths 2019 Able to walk? Yes Fall in past 12 months? -  
Fall with injury? -  
Number of falls in past 12 months - Fall Risk Score -  
 
Abuse Screening:  
 
Abuse Screening Questionnaire 2018 Do you ever feel afraid of your partner? Mick Carlisle Are you in a relationship with someone who physically or mentally threatens you? Mick Carlisle Is it safe for you to go home? Arabella Diop Coordination of Care Questionaire: 1. Have you been to the ER, urgent care clinic since your last visit? Hospitalized since your last visit? Yes hospital for low sugar 2. Have you seen or consulted any other health care providers outside of the [address] [place] [address] since your last visit? Include any pap smears or colon screening. NO Advanced Directive: 1. Do you have an Advanced Directive?  NO 
 
 2. Would you like information on Advanced Directives?  NO

## 2019-02-25 NOTE — PATIENT INSTRUCTIONS

## 2019-03-01 ENCOUNTER — PATIENT OUTREACH (OUTPATIENT)
Dept: FAMILY MEDICINE CLINIC | Age: 79
End: 2019-03-01

## 2019-03-01 NOTE — Clinical Note
Patient is compliant with B/S chks 2 times daily ; ranging  fasting and 129-151 in the PM. See my note for details

## 2019-03-01 NOTE — PROGRESS NOTES
. 
Patient outreach call for [facility] :  Admission 02/16/2019 discharge 02/19/2019 S/P Hypoglycemia Attack Spoke to :  Patient Problems/ concerns/discusion : None Blood sugar readings since JESSICA appointment are ; 
2/26/2019   
2/27/2019   
2/28/2019   
03/01/2019 AM 89 Doing well ,no hypoglycemic or Hyperglycemic attacks Saw Dr Rj Pichardo 2/27/2019 Good eye report Appointment with Nephrology 3/13/2019 Dr. Murray Rivers Provider notified of problems / concerns: YES ,routing encounter Did patient come to [facility] f/u : YES date : 2/25/2019 Any new orders/ changes in office :  
Other orders 
-     albuterol (PROVENTIL HFA, VENTOLIN HFA, PROAIR HFA) 90 mcg/actuation inhaler; Take 2 Puffs by inhalation every six (6) hours as needed for Wheezing. 
-     methylPREDNISolone (MEDROL DOSEPACK) 4 mg tablet; Use as directed on package. 
   
Will restart her Micardis, as this was fine with nephrology when she saw them. 
  
Will go without any Tx for her DM for now. Will see how she does, she will keep a BG and BP logs. 
  
Will f/u in 1 month with both her logs. Next PCP Appointment date:  3/18/2019 Next outreach anticipation date :  3/10/2019 Goals addressed; Zamzam Nielson Goals Addressed This Visit's Progress Diabetes  Patient verbalizes understanding of self -management goals of living with Diabetes. On track Patient is monitoring her blood glucoses 2 times daily as requested .  Reduce risk of comorbid complications related to diabetes (renal disease, heart disease, amputation, and retinopathy). On track Patient is being compliant with f/u PCP, Nephrologist 
  
  
 Post Hospitalization  Knowledge and adherence of prescribed medication (ie. action, side effects, missed dose, etc.). On track  
  amLODIPine (NORVASC) 5 mg PO TABS   Take 2 Tabs by Mouth Once a Day benzonatate (TESSALON) 100 mg PO CAPS   Take 1 Cap by Mouth Every 8 Hours As Needed for Cough.  Prevent complications post hospitalization. On track  Understands red flags post discharge. On track

## 2019-03-15 ENCOUNTER — PATIENT OUTREACH (OUTPATIENT)
Dept: FAMILY MEDICINE CLINIC | Age: 79
End: 2019-03-15

## 2019-03-18 ENCOUNTER — OFFICE VISIT (OUTPATIENT)
Dept: FAMILY MEDICINE CLINIC | Age: 79
End: 2019-03-18

## 2019-03-18 VITALS
HEIGHT: 61 IN | TEMPERATURE: 98 F | SYSTOLIC BLOOD PRESSURE: 132 MMHG | BODY MASS INDEX: 36.74 KG/M2 | DIASTOLIC BLOOD PRESSURE: 64 MMHG | RESPIRATION RATE: 18 BRPM | OXYGEN SATURATION: 98 % | HEART RATE: 61 BPM | WEIGHT: 194.6 LBS

## 2019-03-18 DIAGNOSIS — E11.21 TYPE 2 DIABETES WITH NEPHROPATHY (HCC): Primary | ICD-10-CM

## 2019-03-18 DIAGNOSIS — I10 ESSENTIAL HYPERTENSION: ICD-10-CM

## 2019-03-18 DIAGNOSIS — E66.01 SEVERE OBESITY (BMI 35.0-39.9) WITH COMORBIDITY (HCC): ICD-10-CM

## 2019-03-18 RX ORDER — HYDROCHLOROTHIAZIDE 25 MG/1
25 TABLET ORAL DAILY
COMMUNITY
End: 2019-03-21

## 2019-03-18 NOTE — PROGRESS NOTES
Assessment/Plan:    *Diagnoses and all orders for this visit:    1. Type 2 diabetes with nephropathy ([facility]    2. Essential hypertension    3. Severe obesity (BMI 35.0-39. 9) with comorbidity ([facility]        F/u 6 weeks. Will again review her BS log. Will do an A1c. She will update me on any medication changes. The plan was discussed with the patient. The patient verbalized understanding and is in agreement with the plan. All medication potential side effects were discussed with the patient.    -------------------------------------------------------------------------------------------------------------------        Nimesh Johnson is a 69 y.o. female and presents with Diabetes         Subjective:  Pt here for f/u. HTN: pt here for f/u. Has been to see Dr. Nirav Avery since I last saw her. He took her off Micardis. He put her on HCTZ. She is not sure whether she should continue to take Norvasc so she is going to stop off at his office to ask him. DM: she is off all medications still. Is doing well with her levels, watching her diet closely, has lost some weight. We reviewed her BS log from home. Obesity: has lost some weight and continues to work on this. ROS:  Review of Systems - Negative except as above        The problem list was updated as a part of today's visit. Patient Active Problem List   Diagnosis Code    Iron deficiency anemia D50.9    Type 2 diabetes mellitus without complication, without long-term current use of insulin ([facility] E11.9    Blood in stool K92.1    Secondary localized osteoarthrosis, lower leg M17.5    Unspecified otitis media H66.90    BPPV (benign paroxysmal positional vertigo) H81.10    Type 2 diabetes with nephropathy (HCC) E11.21    Severe obesity (BMI 35.0-39. 9) with comorbidity ([facility] E66.01    Colostomy present ([facility] Z93.3    Acute kidney injury ([facility] N17.9    Anemia of chronic disease D63.8    GERD without esophagitis K21.9    Hypoglycemia E16.2    Essential hypertension I10       The PSH, FH were reviewed. SH:  Social History     Tobacco Use    Smoking status: Never Smoker    Smokeless tobacco: Never Used   Substance Use Topics    Alcohol use: No    Drug use: No       Medications/Allergies:  Current Outpatient Medications on File Prior to Visit   Medication Sig Dispense Refill    hydroCHLOROthiazide (HYDRODIURIL) 25 mg tablet Take 25 mg by mouth daily.  albuterol (PROVENTIL HFA, VENTOLIN HFA, PROAIR HFA) 90 mcg/actuation inhaler Take 2 Puffs by inhalation every six (6) hours as needed for Wheezing. 1 Inhaler 3    NORVASC 5 mg tablet Take 10 mg by mouth daily.  Blood-Glucose Meter (FREESTYLE LITE METER) monitoring kit For three times a day testing    DX: Z91.89;  E16.2, 1 Kit 0    glucose blood VI test strips (FREESTYLE LITE STRIPS) strip For three times a day testing    DX: Z91.89;  E16.2, 300 Strip 2    lancets (FREESTYLE LANCETS) 28 gauge misc For three times a day testing    DX: Z91.89;  E16.2, 300 Lancet 2    alcohol swabs (ALCOHOL PADS) padm For three times a day testing  DX: Z91.89;  E16.2, 300 Pad 2    iron bg,ps/vitC/B12/FA/calcium (NANCY FORTE PO) Take  by mouth.  ergocalciferol (ERGOCALCIFEROL) 50,000 unit capsule Take 1 Cap by mouth every seven (7) days. 12 Cap 1    colesevelam (WELCHOL) 625 mg tablet Take 2 Tabs by mouth two (2) times daily (with meals). 360 Tab 3     No current facility-administered medications on file prior to visit.          Allergies   Allergen Reactions    Adhesive Other (comments)     Skin tears    Amoxicillin Hives         Health Maintenance:   Health Maintenance   Topic Date Due    Shingrix Vaccine Age 50> (1 of 2) 12/03/1991    HEMOGLOBIN A1C Q6M  08/17/2019    FOOT EXAM Q1  09/07/2019    MEDICARE YEARLY EXAM  09/08/2019    MICROALBUMIN Q1  09/21/2019    LIPID PANEL Q1  09/21/2019    GLAUCOMA SCREENING Q2Y  12/22/2019    EYE EXAM RETINAL OR DILATED  12/22/2019    DTaP/Tdap/Td series (2 - Td) 08/17/2025    Bone Densitometry (Dexa) Screening  Completed    Influenza Age 5 to Adult  Completed    Pneumococcal 65+ Low/Medium Risk  Addressed       Objective:  Visit Vitals  /66 (BP 1 Location: Left arm, BP Patient Position: Sitting)   Pulse 61   Temp 98 °F (36.7 °C) (Oral)   Resp 18   Ht 5' 1\" (1.549 m)   Wt 194 lb 9.6 oz (88.3 kg)   SpO2 98%   BMI 36.77 kg/m²          Nurses notes and VS reviewed. Physical Examination: General appearance - alert, well appearing, and in no distress  Chest - clear to auscultation, no wheezes, rales or rhonchi, symmetric air entry  Heart - normal rate, regular rhythm, normal S1, S2, no murmurs, rubs, clicks or gallops  Abdomen - soft, nontender, nondistended, no masses or organomegaly  Extremities - peripheral pulses normal, no pedal edema, no clubbing or cyanosis          Labwork and Ancillary Studies:    CBC w/Diff  Lab Results   Component Value Date/Time    WBC 4.4 (L) 02/08/2019 11:40 AM    HGB 9.6 (L) 02/08/2019 11:40 AM    PLATELET 614 08/47/4875 11:40 AM         Basic Metabolic Profile  Lab Results   Component Value Date/Time    Sodium 142 02/08/2019 11:40 AM    Potassium 5.1 02/08/2019 11:40 AM    Chloride 111 (H) 02/08/2019 11:40 AM    CO2 23 02/08/2019 11:40 AM    Anion gap 8 02/08/2019 11:40 AM    Glucose 87 02/08/2019 11:40 AM    BUN 37 (H) 02/08/2019 11:40 AM    Creatinine 1.75 (H) 02/08/2019 11:40 AM    BUN/Creatinine ratio 21 (H) 02/08/2019 11:40 AM    GFR est AA 34 (L) 02/08/2019 11:40 AM    GFR est non-AA 28 (L) 02/08/2019 11:40 AM    Calcium 9.1 02/08/2019 11:40 AM         LFT  Lab Results   Component Value Date/Time    ALT (SGPT) 16 09/21/2018 10:24 AM    AST (SGOT) 17 09/21/2018 10:24 AM    Alk.  phosphatase 81 09/21/2018 10:24 AM    Bilirubin, total 0.4 09/21/2018 10:24 AM         Cholesterol  Lab Results   Component Value Date/Time    Cholesterol, total 172 09/21/2018 10:24 AM    HDL Cholesterol 75 (H) 09/21/2018 10:24 AM    LDL, calculated 83 09/21/2018 10:24 AM    Triglyceride 70 09/21/2018 10:24 AM    CHOL/HDL Ratio 2.3 09/21/2018 10:24 AM

## 2019-03-18 NOTE — PROGRESS NOTES
Melvin King is a 69 y.o. female (: 1940) presenting to address:    Chief Complaint   Patient presents with    Diabetes       Vitals:    19 1324   BP: 142/66   Pulse: 61   Resp: 18   Temp: 98 °F (36.7 °C)   TempSrc: Oral   SpO2: 98%   Weight: 194 lb 9.6 oz (88.3 kg)   Height: 5' 1\" (1.549 m)   PainSc:   0 - No pain       Hearing/Vision:   No exam data present    Learning Assessment:     Learning Assessment 2015   PRIMARY LEARNER Patient   HIGHEST LEVEL OF EDUCATION - PRIMARY LEARNER  DID NOT GRADUATE HIGH SCHOOL   BARRIERS PRIMARY LEARNER NONE   CO-LEARNER CAREGIVER No   PRIMARY LANGUAGE ENGLISH    NEED No   LEARNER PREFERENCE PRIMARY DEMONSTRATION   ANSWERED BY Patient   RELATIONSHIP SELF     Depression Screening:     3 most recent PHQ Screens 3/18/2019   Little interest or pleasure in doing things Not at all   Feeling down, depressed, irritable, or hopeless Not at all   Total Score PHQ 2 0     Fall Risk Assessment:     Fall Risk Assessment, last 12 mths 2019   Able to walk? Yes   Fall in past 12 months? -   Fall with injury? -   Number of falls in past 12 months -   Fall Risk Score -     Abuse Screening:     Abuse Screening Questionnaire 2018   Do you ever feel afraid of your partner? N   Are you in a relationship with someone who physically or mentally threatens you? N   Is it safe for you to go home? Y     Coordination of Care Questionaire:   1. Have you been to the ER, urgent care clinic since your last visit? Hospitalized since your last visit? NO    2. Have you seen or consulted any other health care providers outside of the [address] [place] [address] since your last visit? Include any pap smears or colon screening. YES Renal     Advanced Directive:   1. Do you have an Advanced Directive? NO    2. Would you like information on Advanced Directives?  YES

## 2019-03-21 ENCOUNTER — PATIENT OUTREACH (OUTPATIENT)
Dept: FAMILY MEDICINE CLINIC | Age: 79
End: 2019-03-21

## 2019-03-21 PROBLEM — E87.5 HYPERKALEMIA: Status: ACTIVE | Noted: 2019-03-20

## 2019-03-21 RX ORDER — TRAVOPROST OPHTHALMIC SOLUTION 0.04 MG/ML
1 SOLUTION OPHTHALMIC
COMMUNITY
End: 2020-11-23 | Stop reason: ALTCHOICE

## 2019-03-21 NOTE — PROGRESS NOTES
. 
Patient outreach call for [facility] :  Admission 02/16/2019 discharge 02/19/2019 S/P Hypoglycemia Spoke with :  No one was available Problems/ concerns /discussion: N/A Provider notified of problems / concerns: N/A Did patient come to [facility] f/u : YES date : 2/25/2019 Did patient have specialist f/u: YES date Dr Ashely Castro, Nephrologist 
 
Any new orders/ changes at office visit :  
 
Next PCP Appointment date:  3/18/2019 Next outreach anticipation date :  3/25 close out eisode

## 2019-03-21 NOTE — PROGRESS NOTES
. 
ED Discharge Follow-Up Date/Time:     3/21/2019 11:45 AM 
 
Patient presented to THE [facility]  ED on 3/20/2019 and was diagnosed with Admission diagnosis: Hyperkalemia. Was sent by Dr. Latasha Cobos , Nephrology office. Top Challenges reviewed with the provider Admitted for Hyperkalemia Most likely 2/2 aldactone and telmisartan . Patient state's that she is NOT on these medications. She said it was the Norvasc and the HYDRODIURIL . They took her off of these medications Method of communication with provider :chart routing Nurse Navigator(NN) contacted the  patient  by telephone to perform post ED discharge assessment. Verified name and  with patient as identifiers. Provided introduction to self, and explanation of the Nurse Navigator role. Patient reported assessment: Abnormal labs, One place say Hypoglycemia the other say Hyperkalemia sent there from Dr. Rashad Krueger office . Medication(s):  
Discrepancies with medications. Jamar Apodaca' EMR Has all f patient's discontinued medications Patient State's they told her specifically not to take Amlodipine and Hydrodiuril. The discharge summary is saying Hyperkalemia 2:2 aldactone and telmisartan/micardis, which patient is not taking these medications. NN will make a medication current PCP list for patient when she comes in for her appointment on 3/26/2019 Reviewed discharge instructions and red flags with  patient who voiced understanding. Patient given an opportunity to ask questions and does not have any further questions or concerns at this time. The patient agrees to contact the PCP office for questions related to their healthcare. Patient reminded that there are physicians on call 24 hours a day / 7 days a week (M-F 5pm to 8am and from Friday 5pm until Monday 8am for the weekend) should the patient have questions or concerns. NN provided contact information for future reference. Offered follow up appointment with PCP: yes [facility] follow up appointment(s):  
Future Appointments Date Time Provider Tavo Vick 3/27/2019  2:00 PM Arnie Mcclelland  [address] [place] [address]  
4/29/2019 10:30 AM Arnie Mcclelland  [address] [place] [address] [facility] follow up appointment(s): n/a [facility]:  n/a  
  
Goals Diabetes  Patient verbalizes understanding of self -management goals of living with Diabetes. Patient is monitoring her blood glucoses 2 times daily as requested . Post Hospitalization  Knowledge and adherence of prescribed medication (ie. action, side effects, missed dose, etc.). amLODIPine (NORVASC) 5 mg PO TABS   Take 2 Tabs by Mouth Once a Day  
 
benzonatate (TESSALON) 100 mg PO CAPS   Take 1 Cap by Mouth Every 8 Hours As Needed for Cough.  Prevent complications post hospitalization. Patient had an ED visit on 3/19-20 for Hyperkalemia sent by Nephrology

## 2019-03-21 NOTE — Clinical Note
Patient coming in for ED visit 3/26/2019. Patient is not taking all of medications that are listed for her. I will write up the current list of medications that we have for her and give it to her.

## 2019-03-27 ENCOUNTER — OFFICE VISIT (OUTPATIENT)
Dept: FAMILY MEDICINE CLINIC | Age: 79
End: 2019-03-27

## 2019-03-27 ENCOUNTER — PATIENT OUTREACH (OUTPATIENT)
Dept: FAMILY MEDICINE CLINIC | Age: 79
End: 2019-03-27

## 2019-03-27 VITALS
HEIGHT: 61 IN | OXYGEN SATURATION: 99 % | HEART RATE: 58 BPM | WEIGHT: 195 LBS | RESPIRATION RATE: 18 BRPM | TEMPERATURE: 98.3 F | SYSTOLIC BLOOD PRESSURE: 156 MMHG | DIASTOLIC BLOOD PRESSURE: 62 MMHG | BODY MASS INDEX: 36.82 KG/M2

## 2019-03-27 DIAGNOSIS — E87.5 HYPERKALEMIA: Primary | ICD-10-CM

## 2019-03-27 LAB — POTASSIUM SERPL-SCNC: 4.7 MMOL/L (ref 3.5–5.5)

## 2019-03-27 RX ORDER — HYDROCHLOROTHIAZIDE 25 MG/1
25 TABLET ORAL DAILY
COMMUNITY
End: 2020-11-23 | Stop reason: ALTCHOICE

## 2019-03-27 NOTE — PATIENT INSTRUCTIONS
Hyperkalemia: Care Instructions Your Care Instructions Hyperkalemia is too much potassium in the blood. Potassium helps keep the right mix of fluids in your body. It also helps your nerves and muscles work as they should. And it keeps your heartbeat in a normal rhythm. Some things can raise potassium levels. These include some health problems, medicines, and kidney problems. (Normally, your kidneys remove extra potassium.) Too much potassium can cause nausea. It also can cause a heartbeat that isn't normal. But you may not have any symptoms. Too much potassium can be dangerous. That's why it's important to treat it. If you are taking any of the medicines that can raise your levels, your doctor will ask you to stop. You may get medicines to lower your levels. And you may have to limit or not eat foods that have a lot of potassium. Follow-up care is a key part of your treatment and safety. Be sure to make and go to all appointments, and call your doctor if you are having problems. It's also a good idea to know your test results and keep a list of the medicines you take. How can you care for yourself at home? · Take your medicines exactly as prescribed. Call your doctor if you think you are having a problem with your medicine. · Stop taking certain medicines if your doctor asks you to. They may be causing your high potassium levels. If you have concerns about stopping medicine, talk with your doctor. · If you have kidney, heart, or liver disease and have to limit fluids, talk with your doctor before you increase the amount of fluids you drink. If the doctor says it's okay, drink plenty of fluids. This means drinking enough so that your urine is light yellow or clear like water. · Avoid strenuous exercise until your doctor tells you it is okay. · Potassium is in many foods, including vegetables, fruits, and milk products.  Foods high in potassium include bananas, cantaloupe, broccoli, milk, potatoes, and tomatoes. · Low potassium foods include blueberries, raspberries, cucumber, white or brown rice, spaghetti, and macaroni. · Do not use a salt substitute without talking to your doctor first. Most of these are very high in potassium. · Be sure to tell your doctor about any prescription, over-the-counter, or herbal medicines you take. Some of these can raise potassium. When should you call for help? Call 911 anytime you think you may need emergency care. For example, call if: 
  · You passed out (lost consciousness).  
  · You have an unusual heartbeat. Your heart may beat fast or skip beats.  
 Call your doctor now or seek immediate medical care if: 
  · You have muscle aches.  
  · You feel very weak.  
 Watch closely for changes in your health, and be sure to contact your doctor if: 
  · You do not get better as expected. Where can you learn more? Go to http://liliam-adrianna.info/. Enter N071 in the search box to learn more about \"Hyperkalemia: Care Instructions. \" Current as of: March 14, 2018 Content Version: 11.9 © 6868-2739 [employer]. Care instructions adapted under license by [employer] (which disclaims liability or warranty for this information). If you have questions about a medical condition or this instruction, always ask your healthcare professional. [address] any warranty or liability for your use of this information.

## 2019-03-27 NOTE — LETTER
3/27/2019 4:40 PM 
 
Ms. Kay Siddiqui [id number] Dear Mrs. Marco Antonio Berrios, Thank you for speaking with me on 3/27/2019. Please find enclosed the information we had discussed. If you need further assistance, please do not hesitate to give me a call at the telephone number listed . Medication list Mrs. Kay Royal 3/27/2019 PCP Glenn Granda  [phone] Nicole Pugh [facility] [address], [place], [facility] 1. Colesevalam (Welchol) 625 mg take 2 tabs twice daily (Lowers Cholesterol) 2. Ergocalciferol (Vitamin D2) 50,000 unit take 1 cap every 7 days (Increase Vit. D) 3. Travoprost (Travatan 0.004% eye drops) 1 drop both eyes @ night 4. Proventil (Pro-air, albuterol) inhaler 2 puffs every 6 hours as needed Wheezing 5. Claudio Forte (Iron 28 mg) 2 tabs daily for anemia 6. Hydrochlorothiazide (HCTZ) 25 mg, 1 tab daily for blood pressure RESTARTED 3/27/19 Dr. Rodolfo Ray may restart Norvasc 5mg at next visit depending on blood pressure Kind regards, 
 
Kelly Salguero RN

## 2019-03-27 NOTE — PROGRESS NOTES
Oxana Acuña is a 69 y.o. female (: 1940) presenting to address: Chief Complaint Patient presents with  
[facility] Follow Up  
  high potassium . new bp medication ? Vitals:  
 19 1400 BP: 156/62 Pulse: (!) 58 Resp: 18 Temp: 98.3 °F (36.8 °C) TempSrc: Oral  
SpO2: 99% Weight: 195 lb (88.5 kg) Height: 5' 1\" (1.549 m) PainSc:   0 - No pain Hearing/Vision: No exam data present Learning Assessment:  
 
Learning Assessment 2015 PRIMARY LEARNER Patient HIGHEST LEVEL OF EDUCATION - PRIMARY LEARNER  DID NOT GRADUATE HIGH SCHOOL  
BARRIERS PRIMARY LEARNER NONE  
CO-LEARNER CAREGIVER No  
PRIMARY LANGUAGE ENGLISH  NEED No  
LEARNER PREFERENCE PRIMARY DEMONSTRATION  
ANSWERED BY Patient RELATIONSHIP SELF Depression Screening:  
 
3 most recent PHQ Screens 3/27/2019 Little interest or pleasure in doing things Not at all Feeling down, depressed, irritable, or hopeless Not at all Total Score PHQ 2 0 Fall Risk Assessment:  
 
Fall Risk Assessment, last 12 mths 3/27/2019 Able to walk? Yes Fall in past 12 months? No  
Fall with injury? -  
Number of falls in past 12 months - Fall Risk Score -  
 
Abuse Screening:  
 
Abuse Screening Questionnaire 2018 Do you ever feel afraid of your partner? [employer] Are you in a relationship with someone who physically or mentally threatens you? [employer] Is it safe for you to go home? Joey Quinones Coordination of Care Questionaire: 1. Have you been to the ER, urgent care clinic since your last visit? Hospitalized since your last visit? Yes for high potassium 2. Have you seen or consulted any other health care providers outside of the [address] [place] [address] since your last visit? Include any pap smears or colon screening. NO Advanced Directive: 1. Do you have an Advanced Directive? NO 
 
2. Would you like information on Advanced Directives?  NO

## 2019-03-27 NOTE — PROGRESS NOTES
NN spoke to patient post her [facility]ESSICA follow up appointment. Doing well. Dr Paulie Knowles restarted her HCTZ today b/p was 156/62. She is to return in 2 weeks for repeat blood pressure check. Patent appointment is 4/10/2019. NN updated medication list and mailed patient a copy as requested . Will check next week to see if she received information.

## 2019-03-27 NOTE — PROGRESS NOTES
Assessment/Plan: *Diagnoses and all orders for this visit: 
 
1. Hyperkalemia 
-     POTASSIUM; Future Pt's BP is not controlled. As per pt, the ER told her that her potassium was high because of her medications and to not take her HCTZ or her Norvasc until she saw me. I told her that neither of these medications would cause a rise in potassium. We will start her back on HCTZ first.  She will get her potassium level checked today. Will f/u in 2 weeks for BP and to see how she is doing. The plan was discussed with the patient. The patient verbalized understanding and is in agreement with the plan. All medication potential side effects were discussed with the patient. 
 
------------------------------------------------------------------------------------------------------------------- Moni Forbes is a 69 y.o. female and presents with Hospital Follow Up (high potassium . new bp medication ? ) Subjective: 
Pt here for f/u after her recent visit to the ER. She had routine labs for Dr. Ry Hill and was found to have an elevated potassium. She was advised to go direct tot he ER, level was 6.5, went up on repeat to 6.7. She has been taking HCTZ and Norvasc for HTN and was taking this prior to her getting this lab done. She was treated as per protocol for hyperkalemia and released. They told her not to take any of her BP meds until she saw us so it has been a week! She feels great otherwise, no other complaints. ROS: 
Review of Systems - Negative The problem list was updated as a part of today's visit. Patient Active Problem List  
Diagnosis Code  Iron deficiency anemia D50.9  Type 2 diabetes mellitus without complication, without long-term current use of insulin (HCC) E11.9  Blood in stool K92.1  Secondary localized osteoarthrosis, lower leg M17.5  Unspecified otitis media H66.90  BPPV (benign paroxysmal positional vertigo) H81.10  Type 2 diabetes with nephropathy (HCC) E11.21  
 Severe obesity (BMI 35.0-39. 9) with comorbidity ([facility] [address] E66.01  
 Colostomy present ([facility] [address] Z93.3  Acute kidney injury ([facility] [address] N17.9  Anemia of chronic disease D63.8  GERD without esophagitis K21.9  Hypoglycemia E16.2  
 Essential hypertension I10  
 Hyperkalemia E87.5 The PSH, FH were reviewed. SH: Social History Tobacco Use  Smoking status: Never Smoker  Smokeless tobacco: Never Used Substance Use Topics  Alcohol use: No  
 Drug use: No  
 
 
Medications/Allergies: 
Current Outpatient Medications on File Prior to Visit Medication Sig Dispense Refill  travoprost (TRAVATAN Z) 0.004 % ophthalmic solution Administer 1 Drop to both eyes nightly.  albuterol (PROVENTIL HFA, VENTOLIN HFA, PROAIR HFA) 90 mcg/actuation inhaler Take 2 Puffs by inhalation every six (6) hours as needed for Wheezing. 1 Inhaler 3  Blood-Glucose Meter (FREESTYLE LITE METER) monitoring kit For three times a day testing    DX: Z91.89;  E16.2, 1 Kit 0  
 glucose blood VI test strips (FREESTYLE LITE STRIPS) strip For three times a day testing    DX: Z91.89;  E16.2, 300 Strip 2  
 alcohol swabs (ALCOHOL PADS) padm For three times a day testing  DX: Z91.89;  E16.2, 300 Pad 2  
 iron bg,ps/vitC/B12/FA/calcium (NANCY FORTE PO) Take  by mouth.  ergocalciferol (ERGOCALCIFEROL) 50,000 unit capsule Take 1 Cap by mouth every seven (7) days. 12 Cap 1  colesevelam (WELCHOL) 625 mg tablet Take 2 Tabs by mouth two (2) times daily (with meals). 360 Tab 3  
 lancets (FREESTYLE LANCETS) 28 gauge misc For three times a day testing    DX: Z91.89;  E16.2, 300 Lancet 2 No current facility-administered medications on file prior to visit. Allergies Allergen Reactions  Adhesive Other (comments) Skin tears  Amoxicillin Hives Health Maintenance:  
Health Maintenance Topic Date Due  
  Shingrix Vaccine Age 50> (1 of 2) 12/03/1991  Pneumococcal 65+ years (1 of 2 - PCV13) 12/03/2006  HEMOGLOBIN A1C Q6M  08/17/2019  
 FOOT EXAM Q1  09/07/2019  MEDICARE YEARLY EXAM  09/08/2019  MICROALBUMIN Q1  09/21/2019  LIPID PANEL Q1  09/21/2019  GLAUCOMA SCREENING Q2Y  12/22/2019  
 EYE EXAM RETINAL OR DILATED  12/22/2019  
 DTaP/Tdap/Td series (2 - Td) 08/17/2025  Bone Densitometry (Dexa) Screening  Completed  Influenza Age 5 to Adult  Completed Objective: 
Visit Vitals /62 (BP 1 Location: Left arm, BP Patient Position: Sitting) Pulse (!) 58 Temp 98.3 °F (36.8 °C) (Oral) Resp 18 Ht 5' 1\" (1.549 m) Wt 195 lb (88.5 kg) SpO2 99% BMI 36.84 kg/m² Nurses notes and VS reviewed. Physical Examination: General appearance - alert, well appearing, and in no distress Chest - clear to auscultation, no wheezes, rales or rhonchi, symmetric air entry Heart - normal rate, regular rhythm, normal S1, S2, no murmurs, rubs, clicks or gallops Labwork and Ancillary Studies: CBC w/Diff Lab Results Component Value Date/Time WBC 4.4 (L) 02/08/2019 11:40 AM  
 HGB 9.6 (L) 02/08/2019 11:40 AM  
 PLATELET 549 27/44/9866 11:40 AM  
  
 
 Basic Metabolic Profile Lab Results Component Value Date/Time Sodium 142 02/08/2019 11:40 AM  
 Potassium 5.1 02/08/2019 11:40 AM  
 Chloride 111 (H) 02/08/2019 11:40 AM  
 CO2 23 02/08/2019 11:40 AM  
 Anion gap 8 02/08/2019 11:40 AM  
 Glucose 87 02/08/2019 11:40 AM  
 BUN 37 (H) 02/08/2019 11:40 AM  
 Creatinine 1.75 (H) 02/08/2019 11:40 AM  
 BUN/Creatinine ratio 21 (H) 02/08/2019 11:40 AM  
 GFR est AA 34 (L) 02/08/2019 11:40 AM  
 GFR est non-AA 28 (L) 02/08/2019 11:40 AM  
 Calcium 9.1 02/08/2019 11:40 AM  
  
  
LFT Lab Results Component Value Date/Time ALT (SGPT) 16 09/21/2018 10:24 AM  
 AST (SGOT) 17 09/21/2018 10:24 AM  
 Alk.  phosphatase 81 09/21/2018 10:24 AM  
 Bilirubin, total 0.4 09/21/2018 10:24 AM  
 
 
 
Cholesterol Lab Results Component Value Date/Time  Cholesterol, total 172 09/21/2018 10:24 AM  
 HDL Cholesterol 75 (H) 09/21/2018 10:24 AM  
 LDL, calculated 83 09/21/2018 10:24 AM  
 Triglyceride 70 09/21/2018 10:24 AM  
 CHOL/HDL Ratio 2.3 09/21/2018 10:24 AM

## 2019-04-08 ENCOUNTER — PATIENT OUTREACH (OUTPATIENT)
Dept: FAMILY MEDICINE CLINIC | Age: 79
End: 2019-04-08

## 2019-04-08 NOTE — PROGRESS NOTES
. 
Patient outreach call for [facility] ED :  Admission 3/20/2019 discharge 3/21/2019 S/P Hyperkalemia Spoke with :  Patient Problems/ concerns /discussion: Patient did received Medication updated list from NN as requested. Stated she has an appointment this week for her B/P check and was going to stop by to thank NN. She is doing very well blood sugar and blood pressure. She is taking her medications as prescribed . Provider notified of problems / concerns: N/A Did patient come to [facility] f/u : YES Next PCP Appointment date:  4/10/2019 Next out reach: 4/10/2019 maybe close out episode

## 2019-04-10 ENCOUNTER — OFFICE VISIT (OUTPATIENT)
Dept: FAMILY MEDICINE CLINIC | Age: 79
End: 2019-04-10

## 2019-04-10 VITALS
SYSTOLIC BLOOD PRESSURE: 118 MMHG | BODY MASS INDEX: 37.38 KG/M2 | HEART RATE: 110 BPM | DIASTOLIC BLOOD PRESSURE: 80 MMHG | TEMPERATURE: 98.7 F | OXYGEN SATURATION: 98 % | HEIGHT: 61 IN | WEIGHT: 198 LBS | RESPIRATION RATE: 16 BRPM

## 2019-04-10 DIAGNOSIS — R00.0 TACHYCARDIA: ICD-10-CM

## 2019-04-10 DIAGNOSIS — I48.91 NEW ONSET ATRIAL FIBRILLATION (HCC): Primary | ICD-10-CM

## 2019-04-10 DIAGNOSIS — I48.19 PERSISTENT ATRIAL FIBRILLATION (HCC): ICD-10-CM

## 2019-04-10 DIAGNOSIS — R06.02 SOB (SHORTNESS OF BREATH): ICD-10-CM

## 2019-04-10 NOTE — PROGRESS NOTES
Corine Linn is a 69 y.o. female (: 1940) presenting to address:    Chief Complaint   Patient presents with    Hypertension       Vitals:    04/10/19 1345   BP: 118/80   Pulse: (!) 110   Resp: 16   Temp: 98.7 °F (37.1 °C)   TempSrc: Oral   SpO2: 98%   Weight: 198 lb (89.8 kg)   Height: 5' 1\" (1.549 m)   PainSc:   0 - No pain       Hearing/Vision:   No exam data present    Learning Assessment:     Learning Assessment 2015   PRIMARY LEARNER Patient   HIGHEST LEVEL OF EDUCATION - PRIMARY LEARNER  DID NOT GRADUATE HIGH SCHOOL   BARRIERS PRIMARY LEARNER NONE   CO-LEARNER CAREGIVER No   PRIMARY LANGUAGE ENGLISH    NEED No   LEARNER PREFERENCE PRIMARY DEMONSTRATION   ANSWERED BY Patient   RELATIONSHIP SELF     Depression Screening:     3 most recent PHQ Screens 3/27/2019   Little interest or pleasure in doing things Not at all   Feeling down, depressed, irritable, or hopeless Not at all   Total Score PHQ 2 0     Fall Risk Assessment:     Fall Risk Assessment, last 12 mths 3/27/2019   Able to walk? Yes   Fall in past 12 months? No   Fall with injury? -   Number of falls in past 12 months -   Fall Risk Score -     Abuse Screening:     Abuse Screening Questionnaire 2018   Do you ever feel afraid of your partner? N   Are you in a relationship with someone who physically or mentally threatens you? N   Is it safe for you to go home? Y     Coordination of Care Questionaire:   1. Have you been to the ER, urgent care clinic since your last visit? Hospitalized since your last visit? NO    2. Have you seen or consulted any other health care providers outside of the [address] [place] [address] since your last visit? Include any pap smears or colon screening. NO    Advanced Directive:   1. Do you have an Advanced Directive? NO    2. Would you like information on Advanced Directives?  NO

## 2019-04-10 NOTE — PROGRESS NOTES
Assessment/Plan:    *Diagnoses and all orders for this visit:    1. New onset atrial fibrillation ([address]    2. Tachycardia  -     AMB POC EKG ROUTINE W/ 12 LEADS, INTER & REP    3. Persistent atrial fibrillation (HCC)  -     ECHO ADULT COMPLETE; Future  -     NUCLEAR CARDIAC STRESS TEST; Future  -     REFERRAL TO CARDIOLOGY  -     apixaban (ELIQUIS) 2.5 mg tablet; Take 1 Tab by mouth two (2) times a day. 4. SOB (shortness of breath)  -     ECHO ADULT COMPLETE; Future  -     NUCLEAR CARDIAC STRESS TEST; Future      New onset atrial fibrillation jesse on EKG. On EKG 2 months ago in [facility], she was NSR. Will help pt set up her appts for the above. I explained to her the need for putting her on a blood thinner but the challenges we face with her age and chronic kidney disease with elevated creatinine. Will try to get eliquis but may need to change to coumadin. The plan was discussed with the patient. The patient verbalized understanding and is in agreement with the plan. All medication potential side effects were discussed with the patient.    -------------------------------------------------------------------------------------------------------------------        Sue Alfaro is a 69 y.o. female and presents with Hypertension         Subjective:  Pt here for f/u of her BP. After her last issue with high potassium, and the ER telling her to stop all her BP meds, when her reading was high, we recommended she start just HCTZ for her HTN. She has been able to come off all DM meds for now as her A1c has been low. On evaluation, she is tachycardic. On questioning her, she says that she sometimes has a rapid heart beat but not always. ROS:  Review of Systems - Negative except as above        The problem list was updated as a part of today's visit.   Patient Active Problem List   Diagnosis Code    Iron deficiency anemia D50.9    Type 2 diabetes mellitus without complication, without long-term current use of insulin ([facility]) E11.9    Blood in stool K92.1    Secondary localized osteoarthrosis, lower leg M17.5    Unspecified otitis media H66.90    BPPV (benign paroxysmal positional vertigo) H81.10    Type 2 diabetes with nephropathy ([facility]) E11.21    Severe obesity (BMI 35.0-39. 9) with comorbidity ([facility] [address] E66.01    Colostomy present ([facility] [address] Z93.3    Acute kidney injury ([facility] [address] N17.9    Anemia of chronic disease D63.8    GERD without esophagitis K21.9    Hypoglycemia E16.2    Essential hypertension I10    Hyperkalemia E87.5       The PSH, FH were reviewed. SH:  Social History     Tobacco Use    Smoking status: Never Smoker    Smokeless tobacco: Never Used   Substance Use Topics    Alcohol use: No    Drug use: No       Medications/Allergies:  Current Outpatient Medications on File Prior to Visit   Medication Sig Dispense Refill    hydroCHLOROthiazide (HYDRODIURIL) 25 mg tablet Take 25 mg by mouth daily. Indications: high blood pressure, Started 03/13/2019/ restarted by Dr Khris Mims 3/27/2019      travoprost (TRAVATAN Z) 0.004 % ophthalmic solution Administer 1 Drop to both eyes nightly.  albuterol (PROVENTIL HFA, VENTOLIN HFA, PROAIR HFA) 90 mcg/actuation inhaler Take 2 Puffs by inhalation every six (6) hours as needed for Wheezing. 1 Inhaler 3    Blood-Glucose Meter (FREESTYLE LITE METER) monitoring kit For three times a day testing    DX: Z91.89;  E16.2, 1 Kit 0    glucose blood VI test strips (FREESTYLE LITE STRIPS) strip For three times a day testing    DX: Z91.89;  E16.2, 300 Strip 2    lancets (FREESTYLE LANCETS) 28 gauge misc For three times a day testing    DX: Z91.89;  E16.2, 300 Lancet 2    alcohol swabs (ALCOHOL PADS) padm For three times a day testing  DX: Z91.89;  E16.2, 300 Pad 2    iron bg,ps/vitC/B12/FA/calcium (NANCY FORTE PO) Take 1 Tab by mouth daily.  ergocalciferol (ERGOCALCIFEROL) 50,000 unit capsule Take 1 Cap by mouth every seven (7) days.  12 Cap 1    colesevelam (WELCHOL) 625 mg tablet Take 2 Tabs by mouth two (2) times daily (with meals). 360 Tab 3     No current facility-administered medications on file prior to visit. Allergies   Allergen Reactions    Adhesive Other (comments)     Skin tears    Amoxicillin Hives         Health Maintenance:   Health Maintenance   Topic Date Due    Shingrix Vaccine Age 49> (1 of 2) 12/03/1991    Pneumococcal 65+ years (1 of 2 - PCV13) 12/03/2006    Influenza Age 5 to Adult  08/01/2019    HEMOGLOBIN A1C Q6M  08/17/2019    FOOT EXAM Q1  09/07/2019    MEDICARE YEARLY EXAM  09/08/2019    MICROALBUMIN Q1  09/21/2019    LIPID PANEL Q1  09/21/2019    GLAUCOMA SCREENING Q2Y  12/22/2019    EYE EXAM RETINAL OR DILATED  12/22/2019    DTaP/Tdap/Td series (2 - Td) 08/17/2025    Bone Densitometry (Dexa) Screening  Completed       Objective:  Visit Vitals  /80 (BP 1 Location: Left arm, BP Patient Position: Sitting)   Pulse (!) 110   Temp 98.7 °F (37.1 °C) (Oral)   Resp 16   Ht 5' 1\" (1.549 m)   Wt 198 lb (89.8 kg)   SpO2 98%   BMI 37.41 kg/m²          Nurses notes and VS reviewed. Physical Examination: General appearance - alert, well appearing, and in no distress  Chest - clear to auscultation, no wheezes, rales or rhonchi, symmetric air entry  Heart - irregularly irregular rhythm with rate tachycardic.         Labwork and Ancillary Studies:    CBC w/Diff  Lab Results   Component Value Date/Time    WBC 4.4 (L) 02/08/2019 11:40 AM    HGB 9.6 (L) 02/08/2019 11:40 AM    PLATELET 673 11/08/3094 11:40 AM         Basic Metabolic Profile  Lab Results   Component Value Date/Time    Sodium 142 02/08/2019 11:40 AM    Potassium 4.7 03/27/2019 04:22 PM    Chloride 111 (H) 02/08/2019 11:40 AM    CO2 23 02/08/2019 11:40 AM    Anion gap 8 02/08/2019 11:40 AM    Glucose 87 02/08/2019 11:40 AM    BUN 37 (H) 02/08/2019 11:40 AM    Creatinine 1.75 (H) 02/08/2019 11:40 AM    BUN/Creatinine ratio 21 (H) 02/08/2019 11:40 AM    GFR est AA 34 (L) 02/08/2019 11:40 AM    GFR est non-AA 28 (L) 02/08/2019 11:40 AM    Calcium 9.1 02/08/2019 11:40 AM         LFT  Lab Results   Component Value Date/Time    ALT (SGPT) 16 09/21/2018 10:24 AM    AST (SGOT) 17 09/21/2018 10:24 AM    Alk.  phosphatase 81 09/21/2018 10:24 AM    Bilirubin, total 0.4 09/21/2018 10:24 AM         Cholesterol  Lab Results   Component Value Date/Time    Cholesterol, total 172 09/21/2018 10:24 AM    HDL Cholesterol 75 (H) 09/21/2018 10:24 AM    LDL, calculated 83 09/21/2018 10:24 AM    Triglyceride 70 09/21/2018 10:24 AM    CHOL/HDL Ratio 2.3 09/21/2018 10:24 AM

## 2019-04-12 ENCOUNTER — TELEPHONE (OUTPATIENT)
Dept: FAMILY MEDICINE CLINIC | Age: 79
End: 2019-04-12

## 2019-05-01 ENCOUNTER — PATIENT OUTREACH (OUTPATIENT)
Dept: FAMILY MEDICINE CLINIC | Age: 79
End: 2019-05-01

## 2019-05-02 ENCOUNTER — TELEPHONE (OUTPATIENT)
Dept: FAMILY MEDICINE CLINIC | Age: 79
End: 2019-05-02

## 2019-05-02 NOTE — TELEPHONE ENCOUNTER
Please notify pt that her stress test shows that she does NOT have any arterial blockages that affect blood flow to the heart muscle. But her echo does show that she does have a decrease in the pumping function of her heart. Her ejection fraction is 38% where it should typically be 55-65%. I know she sees cardiology on 5/6/19 and they will advise on how this should be treated. They will likely make some changes to her medications. I will wait for their recommendations.

## 2019-05-10 DIAGNOSIS — R06.02 SOB (SHORTNESS OF BREATH): ICD-10-CM

## 2019-05-10 DIAGNOSIS — I48.19 PERSISTENT ATRIAL FIBRILLATION (HCC): ICD-10-CM

## 2019-05-14 ENCOUNTER — OFFICE VISIT (OUTPATIENT)
Dept: FAMILY MEDICINE CLINIC | Age: 79
End: 2019-05-14

## 2019-05-14 VITALS
HEIGHT: 61 IN | OXYGEN SATURATION: 98 % | HEART RATE: 68 BPM | WEIGHT: 200 LBS | RESPIRATION RATE: 16 BRPM | TEMPERATURE: 97.8 F | DIASTOLIC BLOOD PRESSURE: 86 MMHG | BODY MASS INDEX: 37.76 KG/M2 | SYSTOLIC BLOOD PRESSURE: 120 MMHG

## 2019-05-14 DIAGNOSIS — E55.9 HYPOVITAMINOSIS D: ICD-10-CM

## 2019-05-14 DIAGNOSIS — E11.21 TYPE 2 DIABETES WITH NEPHROPATHY (HCC): Primary | ICD-10-CM

## 2019-05-14 DIAGNOSIS — I48.19 PERSISTENT ATRIAL FIBRILLATION (HCC): ICD-10-CM

## 2019-05-14 DIAGNOSIS — I10 ESSENTIAL HYPERTENSION: ICD-10-CM

## 2019-05-14 LAB — HBA1C MFR BLD HPLC: 5.3 %

## 2019-05-14 NOTE — PROGRESS NOTES
Yeyo Storey is a 69 y.o. female (: 1940) presenting to address: Chief Complaint Patient presents with  Irregular Heart Beat  
  follow up from cardio Vitals:  
 19 1014 BP: 120/86 Pulse: 68 Resp: 16 Temp: 97.8 °F (36.6 °C) TempSrc: Oral  
SpO2: 98% Weight: 200 lb (90.7 kg) Height: 5' 1\" (1.549 m) PainSc:   0 - No pain Hearing/Vision: No exam data present Learning Assessment:  
 
Learning Assessment 2015 PRIMARY LEARNER Patient HIGHEST LEVEL OF EDUCATION - PRIMARY LEARNER  DID NOT GRADUATE HIGH SCHOOL  
BARRIERS PRIMARY LEARNER NONE  
CO-LEARNER CAREGIVER No  
PRIMARY LANGUAGE ENGLISH  NEED No  
LEARNER PREFERENCE PRIMARY DEMONSTRATION  
ANSWERED BY Patient RELATIONSHIP SELF Depression Screening:  
 
3 most recent PHQ Screens 2019 Little interest or pleasure in doing things Not at all Feeling down, depressed, irritable, or hopeless Not at all Total Score PHQ 2 0 Fall Risk Assessment:  
 
Fall Risk Assessment, last 12 mths 3/27/2019 Able to walk? Yes Fall in past 12 months? No  
Fall with injury? -  
Number of falls in past 12 months - Fall Risk Score -  
 
Abuse Screening:  
 
Abuse Screening Questionnaire 2018 Do you ever feel afraid of your partner? Stefani Schneider Are you in a relationship with someone who physically or mentally threatens you? Stefani Schneider Is it safe for you to go home? Maria M Valenzuela Coordination of Care Questionaire: 1. Have you been to the ER, urgent care clinic since your last visit? Hospitalized since your last visit? NO 
 
2. Have you seen or consulted any other health care providers outside of the [address] [place] [address] since your last visit? Include any pap smears or colon screening. NO Advanced Directive: 1. Do you have an Advanced Directive? NO 
 
2. Would you like information on Advanced Directives?  NO

## 2019-05-14 NOTE — PATIENT INSTRUCTIONS
High Blood Pressure: Care Instructions Overview It's normal for blood pressure to go up and down throughout the day. But if it stays up, you have high blood pressure. Another name for high blood pressure is hypertension. Despite what a lot of people think, high blood pressure usually doesn't cause headaches or make you feel dizzy or lightheaded. It usually has no symptoms. But it does increase your risk of stroke, heart attack, and other problems. You and your doctor will talk about your risks of these problems based on your blood pressure. Your doctor will give you a goal for your blood pressure. Your goal will be based on your health and your age. Lifestyle changes, such as eating healthy and being active, are always important to help lower blood pressure. You might also take medicine to reach your blood pressure goal. 
Follow-up care is a key part of your treatment and safety. Be sure to make and go to all appointments, and call your doctor if you are having problems. It's also a good idea to know your test results and keep a list of the medicines you take. How can you care for yourself at home? Medical treatment · If you stop taking your medicine, your blood pressure will go back up. You may take one or more types of medicine to lower your blood pressure. Be safe with medicines. Take your medicine exactly as prescribed. Call your doctor if you think you are having a problem with your medicine. · Talk to your doctor before you start taking aspirin every day. Aspirin can help certain people lower their risk of a heart attack or stroke. But taking aspirin isn't right for everyone, because it can cause serious bleeding. · See your doctor regularly. You may need to see the doctor more often at first or until your blood pressure comes down. · If you are taking blood pressure medicine, talk to your doctor before you take decongestants or anti-inflammatory medicine, such as ibuprofen. Some of these medicines can raise blood pressure. · Learn how to check your blood pressure at home. Lifestyle changes · Stay at a healthy weight. This is especially important if you put on weight around the waist. Losing even 10 pounds can help you lower your blood pressure. · If your doctor recommends it, get more exercise. Walking is a good choice. Bit by bit, increase the amount you walk every day. Try for at least 30 minutes on most days of the week. You also may want to swim, bike, or do other activities. · Avoid or limit alcohol. Talk to your doctor about whether you can drink any alcohol. · Try to limit how much sodium you eat to less than 2,300 milligrams (mg) a day. Your doctor may ask you to try to eat less than 1,500 mg a day. · Eat plenty of fruits (such as bananas and oranges), vegetables, legumes, whole grains, and low-fat dairy products. · Lower the amount of saturated fat in your diet. Saturated fat is found in animal products such as milk, cheese, and meat. Limiting these foods may help you lose weight and also lower your risk for heart disease. · Do not smoke. Smoking increases your risk for heart attack and stroke. If you need help quitting, talk to your doctor about stop-smoking programs and medicines. These can increase your chances of quitting for good. When should you call for help? Call 911 anytime you think you may need emergency care. This may mean having symptoms that suggest that your blood pressure is causing a serious heart or blood vessel problem. Your blood pressure may be over 180/120. 
 For example, call 911 if: 
  · You have symptoms of a heart attack. These may include: 
? Chest pain or pressure, or a strange feeling in the chest. 
? Sweating. ? Shortness of breath. ? Nausea or vomiting. ? Pain, pressure, or a strange feeling in the back, neck, jaw, or upper belly or in one or both shoulders or arms. ? Lightheadedness or sudden weakness. ? A fast or irregular heartbeat.  
  · You have symptoms of a stroke. These may include: 
? Sudden numbness, tingling, weakness, or loss of movement in your face, arm, or leg, especially on only one side of your body. ? Sudden vision changes. ? Sudden trouble speaking. ? Sudden confusion or trouble understanding simple statements. ? Sudden problems with walking or balance. ? A sudden, severe headache that is different from past headaches.  
  · You have severe back or belly pain.  
 Do not wait until your blood pressure comes down on its own. Get help right away. 
 Call your doctor now or seek immediate care if: 
  · Your blood pressure is much higher than normal (such as 180/120 or higher), but you don't have symptoms.  
  · You think high blood pressure is causing symptoms, such as: 
? Severe headache. 
? Blurry vision.  
 Watch closely for changes in your health, and be sure to contact your doctor if: 
  · Your blood pressure measures higher than your doctor recommends at least 2 times. That means the top number is higher or the bottom number is higher, or both.  
  · You think you may be having side effects from your blood pressure medicine. Where can you learn more? Go to http://liliam-adrianna.info/. Enter T992 in the search box to learn more about \"High Blood Pressure: Care Instructions. \" Current as of: July 22, 2018 Content Version: 11.9 © 5951-8316 [employer]. Care instructions adapted under license by [employer] (which disclaims liability or warranty for this information). If you have questions about a medical condition or this instruction, always ask your healthcare professional. [place] [address] any warranty or liability for your use of this information.

## 2019-05-14 NOTE — PROGRESS NOTES
Assessment/Plan: *Diagnoses and all orders for this visit: 
 
1. Type 2 diabetes with nephropathy (HCC) -     AMB POC HEMOGLOBIN A1C 
-     CBC WITH AUTOMATED DIFF; Future 
-     HEPATIC FUNCTION PANEL; Future -     LIPID PANEL; Future -     METABOLIC PANEL, BASIC; Future 
-     TSH 3RD GENERATION; Future -     T4, FREE; Future -     URINALYSIS W/ RFLX MICROSCOPIC; Future 
-     HEMOGLOBIN A1C W/O EAG; Future -     MICROALBUMIN, UR, RAND W/ MICROALB/CREAT RATIO; Future 2. Persistent atrial fibrillation ([address] 3. Essential hypertension 4. Hypovitaminosis D 
-     VITAMIN D, 25 HYDROXY; Future Will return for physical in Sept. 2019. The plan was discussed with the patient. The patient verbalized understanding and is in agreement with the plan. All medication potential side effects were discussed with the patient. 
 
------------------------------------------------------------------------------------------------------------------- Mirian Trevino is a 69 y.o. female and presents with Irregular Heart Beat (follow up from cardio ) Subjective: 
Pt here for f/u. AF:  Pt has been seen by Dr. Henry Lynne. He increased her Eliquis to 5 mg. He has plans to do cardioversion, pt is waiting to hear about when this will be but it will occur in the next few weeks. DM:  Is no longer on medication and is no longer diabetic! HTN:  Well controlled. ROS: 
Review of Systems - Negative except as above The problem list was updated as a part of today's visit. Patient Active Problem List  
Diagnosis Code  Iron deficiency anemia D50.9  Type 2 diabetes mellitus without complication, without long-term current use of insulin (HCC) E11.9  Blood in stool K92.1  Secondary localized osteoarthrosis, lower leg M17.5  Unspecified otitis media H66.90  BPPV (benign paroxysmal positional vertigo) H81.10  Type 2 diabetes with nephropathy (HCC) E11.21  
  Severe obesity (BMI 35.0-39. 9) with comorbidity ([facility] E66.01  
 Colostomy present ([facility] [address] Z93.3  Acute kidney injury ([facility] [address] N17.9  Anemia of chronic disease D63.8  GERD without esophagitis K21.9  Hypoglycemia E16.2  
 Essential hypertension I10  
 Hyperkalemia E87.5 The PSH, FH were reviewed. SH: Social History Tobacco Use  Smoking status: Never Smoker  Smokeless tobacco: Never Used Substance Use Topics  Alcohol use: No  
 Drug use: No  
 
 
Medications/Allergies: 
Current Outpatient Medications on File Prior to Visit Medication Sig Dispense Refill  peg 400-propylene glycol (SYSTANE, PROPYLENE GLYCOL,) 0.4-0.3 % drop as needed.  apixaban (ELIQUIS) 2.5 mg tablet Take 1 Tab by mouth two (2) times a day. (Patient taking differently: Take 5 mg by mouth two (2) times a day.) 60 Tab 2  
 hydroCHLOROthiazide (HYDRODIURIL) 25 mg tablet Take 25 mg by mouth daily. Indications: high blood pressure, Started 03/13/2019/ restarted by Dr Steven Askew 3/27/2019  travoprost (TRAVATAN Z) 0.004 % ophthalmic solution Administer 1 Drop to both eyes nightly.  albuterol (PROVENTIL HFA, VENTOLIN HFA, PROAIR HFA) 90 mcg/actuation inhaler Take 2 Puffs by inhalation every six (6) hours as needed for Wheezing. 1 Inhaler 3  Blood-Glucose Meter (FREESTYLE LITE METER) monitoring kit For three times a day testing    DX: Z91.89;  E16.2, 1 Kit 0  
 glucose blood VI test strips (FREESTYLE LITE STRIPS) strip For three times a day testing    DX: Z91.89;  E16.2, 300 Strip 2  
 lancets (FREESTYLE LANCETS) 28 gauge misc For three times a day testing    DX: Z91.89;  E16.2, 300 Lancet 2  
 alcohol swabs (ALCOHOL PADS) padm For three times a day testing  DX: Z91.89;  E16.2, 300 Pad 2  
 iron bg,ps/vitC/B12/FA/calcium (NANCY FORTE PO) Take 1 Tab by mouth daily.  ergocalciferol (ERGOCALCIFEROL) 50,000 unit capsule Take 1 Cap by mouth every seven (7) days.  12 Cap 1  
  colesevelam (WELCHOL) 625 mg tablet Take 2 Tabs by mouth two (2) times daily (with meals). 360 Tab 3 No current facility-administered medications on file prior to visit. Allergies Allergen Reactions  Adhesive Other (comments) Skin tears  Amoxicillin Hives Health Maintenance:  
Health Maintenance Topic Date Due  Shingrix Vaccine Age 50> (1 of 2) 12/03/1991  Pneumococcal 65+ years (1 of 2 - PCV13) 12/03/2006  Influenza Age 5 to Adult  08/01/2019  
 HEMOGLOBIN A1C Q6M  08/17/2019  
 FOOT EXAM Q1  09/07/2019  MEDICARE YEARLY EXAM  09/08/2019  MICROALBUMIN Q1  09/21/2019  LIPID PANEL Q1  09/21/2019  GLAUCOMA SCREENING Q2Y  12/22/2019  
 EYE EXAM RETINAL OR DILATED  12/22/2019  
 DTaP/Tdap/Td series (2 - Td) 08/17/2025  Bone Densitometry (Dexa) Screening  Completed Objective: 
Visit Vitals /86 (BP 1 Location: Left arm, BP Patient Position: Sitting) Pulse 68 Temp 97.8 °F (36.6 °C) (Oral) Resp 16 Ht 5' 1\" (1.549 m) Wt 200 lb (90.7 kg) SpO2 98% BMI 37.79 kg/m² Nurses notes and VS reviewed. Physical Examination: General appearance - alert, well appearing, and in no distress Chest - clear to auscultation, no wheezes, rales or rhonchi, symmetric air entry Heart - irregularly irregular rhythm with rate controlled. Labwork and Ancillary Studies: CBC w/Diff Lab Results Component Value Date/Time WBC 4.4 (L) 02/08/2019 11:40 AM  
 HGB 9.6 (L) 02/08/2019 11:40 AM  
 PLATELET 798 18/38/7852 11:40 AM  
  
 
 Basic Metabolic Profile Lab Results Component Value Date/Time  Sodium 142 02/08/2019 11:40 AM  
 Potassium 4.7 03/27/2019 04:22 PM  
 Chloride 111 (H) 02/08/2019 11:40 AM  
 CO2 23 02/08/2019 11:40 AM  
 Anion gap 8 02/08/2019 11:40 AM  
 Glucose 87 02/08/2019 11:40 AM  
 BUN 37 (H) 02/08/2019 11:40 AM  
 Creatinine 1.75 (H) 02/08/2019 11:40 AM  
 BUN/Creatinine ratio 21 (H) 02/08/2019 11:40 AM  
 GFR est AA 34 (L) 02/08/2019 11:40 AM  
 GFR est non-AA 28 (L) 02/08/2019 11:40 AM  
 Calcium 9.1 02/08/2019 11:40 AM  
  
  
LFT Lab Results Component Value Date/Time ALT (SGPT) 16 09/21/2018 10:24 AM  
 AST (SGOT) 17 09/21/2018 10:24 AM  
 Alk. phosphatase 81 09/21/2018 10:24 AM  
 Bilirubin, total 0.4 09/21/2018 10:24 AM  
 
 
 
Cholesterol Lab Results Component Value Date/Time  Cholesterol, total 172 09/21/2018 10:24 AM  
 HDL Cholesterol 75 (H) 09/21/2018 10:24 AM  
 LDL, calculated 83 09/21/2018 10:24 AM  
 Triglyceride 70 09/21/2018 10:24 AM  
 CHOL/HDL Ratio 2.3 09/21/2018 10:24 AM

## 2019-06-05 RX ORDER — ERGOCALCIFEROL 1.25 MG/1
CAPSULE ORAL
Qty: 12 CAP | Refills: 1 | Status: SHIPPED | OUTPATIENT
Start: 2019-06-05 | End: 2020-01-06 | Stop reason: SDUPTHER

## 2019-08-23 ENCOUNTER — OFFICE VISIT (OUTPATIENT)
Dept: FAMILY MEDICINE CLINIC | Age: 79
End: 2019-08-23

## 2019-08-23 VITALS
RESPIRATION RATE: 16 BRPM | SYSTOLIC BLOOD PRESSURE: 162 MMHG | WEIGHT: 197 LBS | HEIGHT: 61 IN | TEMPERATURE: 97.1 F | DIASTOLIC BLOOD PRESSURE: 80 MMHG | BODY MASS INDEX: 37.19 KG/M2 | HEART RATE: 49 BPM | OXYGEN SATURATION: 100 %

## 2019-08-23 DIAGNOSIS — I10 ESSENTIAL HYPERTENSION: ICD-10-CM

## 2019-08-23 DIAGNOSIS — J06.9 UPPER RESPIRATORY TRACT INFECTION, UNSPECIFIED TYPE: Primary | ICD-10-CM

## 2019-08-23 RX ORDER — TELMISARTAN 80 MG/1
80 TABLET ORAL DAILY
COMMUNITY
End: 2019-08-23 | Stop reason: ALTCHOICE

## 2019-08-23 RX ORDER — AZITHROMYCIN 250 MG/1
TABLET, FILM COATED ORAL
Qty: 6 TAB | Refills: 0 | Status: SHIPPED | OUTPATIENT
Start: 2019-08-23 | End: 2019-08-23 | Stop reason: SDUPTHER

## 2019-08-23 RX ORDER — AZITHROMYCIN 250 MG/1
TABLET, FILM COATED ORAL
Qty: 6 TAB | Refills: 0 | Status: SHIPPED | OUTPATIENT
Start: 2019-08-23 | End: 2019-08-28

## 2019-08-23 RX ORDER — VALSARTAN 160 MG/1
160 TABLET ORAL DAILY
Qty: 90 TAB | Refills: 1 | Status: SHIPPED | OUTPATIENT
Start: 2019-08-23 | End: 2019-09-11 | Stop reason: SDUPTHER

## 2019-08-23 NOTE — PATIENT INSTRUCTIONS

## 2019-08-23 NOTE — PROGRESS NOTES
Jeanine Lennox is a 69 y.o. female (: 1940) presenting to address:    Chief Complaint   Patient presents with    Hypertension       Vitals:    19 0909   BP: 162/80   Pulse: (!) 49   Resp: 16   Temp: 97.1 °F (36.2 °C)   TempSrc: Oral   SpO2: 100%   Weight: 197 lb (89.4 kg)   Height: 5' 1\" (1.549 m)   PainSc:   3   PainLoc: Head       Hearing/Vision:   No exam data present    Learning Assessment:     Learning Assessment 2015   PRIMARY LEARNER Patient   HIGHEST LEVEL OF EDUCATION - PRIMARY LEARNER  DID NOT GRADUATE HIGH SCHOOL   BARRIERS PRIMARY LEARNER NONE   CO-LEARNER CAREGIVER No   PRIMARY LANGUAGE ENGLISH    NEED No   LEARNER PREFERENCE PRIMARY DEMONSTRATION   ANSWERED BY Patient   RELATIONSHIP SELF     Depression Screening:     3 most recent PHQ Screens 2019   Little interest or pleasure in doing things Not at all   Feeling down, depressed, irritable, or hopeless Not at all   Total Score PHQ 2 0     Fall Risk Assessment:     Fall Risk Assessment, last 12 mths 3/27/2019   Able to walk? Yes   Fall in past 12 months? No   Fall with injury? -   Number of falls in past 12 months -   Fall Risk Score -     Abuse Screening:     Abuse Screening Questionnaire 2018   Do you ever feel afraid of your partner? N   Are you in a relationship with someone who physically or mentally threatens you? N   Is it safe for you to go home? Y     Coordination of Care Questionaire:   1. Have you been to the ER, urgent care clinic since your last visit? Hospitalized since your last visit? YES    2. Have you seen or consulted any other health care providers outside of the [address] [place] [address] since your last visit? Include any pap smears or colon screening. NO    Advanced Directive:   1. Do you have an Advanced Directive? NO    2. Would you like information on Advanced Directives?  NO

## 2019-09-05 ENCOUNTER — HOSPITAL ENCOUNTER (OUTPATIENT)
Dept: LAB | Age: 79
Discharge: HOME | End: 2019-09-05
Payer: MEDICARE

## 2019-09-05 DIAGNOSIS — E11.21 TYPE 2 DIABETES WITH NEPHROPATHY (HCC): ICD-10-CM

## 2019-09-05 LAB
ALBUMIN SERPL-MCNC: 3.5 G/DL (ref 3.4–5)
ALBUMIN/GLOB SERPL: 1 {RATIO} (ref 0.8–1.7)
ALP SERPL-CCNC: 107 U/L (ref 45–117)
ALT SERPL-CCNC: 21 U/L (ref 13–56)
ANION GAP SERPL CALC-SCNC: 3 MMOL/L (ref 3–18)
APPEARANCE UR: CLEAR
AST SERPL-CCNC: 17 U/L (ref 10–38)
BACTERIA URNS QL MICRO: NEGATIVE /HPF
BASOPHILS # BLD: 0 K/UL (ref 0–0.1)
BASOPHILS NFR BLD: 0 % (ref 0–2)
BILIRUB DIRECT SERPL-MCNC: 0.1 MG/DL (ref 0–0.2)
BILIRUB SERPL-MCNC: 0.4 MG/DL (ref 0.2–1)
BILIRUB UR QL: NEGATIVE
BUN SERPL-MCNC: 59 MG/DL (ref 7–18)
BUN/CREAT SERPL: 37 (ref 12–20)
CALCIUM SERPL-MCNC: 9.3 MG/DL (ref 8.5–10.1)
CHLORIDE SERPL-SCNC: 106 MMOL/L (ref 100–111)
CHOLEST SERPL-MCNC: 207 MG/DL
CO2 SERPL-SCNC: 29 MMOL/L (ref 21–32)
COLOR UR: YELLOW
CREAT SERPL-MCNC: 1.61 MG/DL (ref 0.6–1.3)
CREAT UR-MCNC: 58 MG/DL (ref 30–125)
DIFFERENTIAL METHOD BLD: ABNORMAL
EOSINOPHIL # BLD: 0.1 K/UL (ref 0–0.4)
EOSINOPHIL NFR BLD: 1 % (ref 0–5)
EPITH CASTS URNS QL MICRO: NORMAL /LPF (ref 0–5)
ERYTHROCYTE [DISTWIDTH] IN BLOOD BY AUTOMATED COUNT: 16.1 % (ref 11.6–14.5)
GLOBULIN SER CALC-MCNC: 3.4 G/DL (ref 2–4)
GLUCOSE SERPL-MCNC: 86 MG/DL (ref 74–99)
GLUCOSE UR STRIP.AUTO-MCNC: NEGATIVE MG/DL
HBA1C MFR BLD: 5.8 % (ref 4.2–5.6)
HCT VFR BLD AUTO: 34.7 % (ref 35–45)
HDLC SERPL-MCNC: 92 MG/DL (ref 40–60)
HDLC SERPL: 2.3 {RATIO} (ref 0–5)
HGB BLD-MCNC: 11.4 G/DL (ref 12–16)
HGB UR QL STRIP: NEGATIVE
KETONES UR QL STRIP.AUTO: NEGATIVE MG/DL
LDLC SERPL CALC-MCNC: 97.6 MG/DL (ref 0–100)
LEUKOCYTE ESTERASE UR QL STRIP.AUTO: ABNORMAL
LIPID PROFILE,FLP: ABNORMAL
LYMPHOCYTES # BLD: 2.3 K/UL (ref 0.9–3.6)
LYMPHOCYTES NFR BLD: 39 % (ref 21–52)
MCH RBC QN AUTO: 28.8 PG (ref 24–34)
MCHC RBC AUTO-ENTMCNC: 32.9 G/DL (ref 31–37)
MCV RBC AUTO: 87.6 FL (ref 74–97)
MICROALBUMIN UR-MCNC: 2.89 MG/DL (ref 0–3)
MICROALBUMIN/CREAT UR-RTO: 50 MG/G (ref 0–30)
MONOCYTES # BLD: 0.4 K/UL (ref 0.05–1.2)
MONOCYTES NFR BLD: 6 % (ref 3–10)
NEUTS SEG # BLD: 3.1 K/UL (ref 1.8–8)
NEUTS SEG NFR BLD: 54 % (ref 40–73)
NITRITE UR QL STRIP.AUTO: NEGATIVE
PH UR STRIP: 5 [PH] (ref 5–8)
PLATELET # BLD AUTO: 162 K/UL (ref 135–420)
PMV BLD AUTO: 10.5 FL (ref 9.2–11.8)
POTASSIUM SERPL-SCNC: 4.9 MMOL/L (ref 3.5–5.5)
PROT SERPL-MCNC: 6.9 G/DL (ref 6.4–8.2)
PROT UR STRIP-MCNC: NEGATIVE MG/DL
RBC # BLD AUTO: 3.96 M/UL (ref 4.2–5.3)
RBC #/AREA URNS HPF: 0 /HPF (ref 0–5)
SODIUM SERPL-SCNC: 138 MMOL/L (ref 136–145)
SP GR UR REFRACTOMETRY: 1.01 (ref 1–1.03)
T4 FREE SERPL-MCNC: 0.9 NG/DL (ref 0.7–1.5)
TRIGL SERPL-MCNC: 87 MG/DL (ref ?–150)
TSH SERPL DL<=0.05 MIU/L-ACNC: 1.55 UIU/ML (ref 0.36–3.74)
UROBILINOGEN UR QL STRIP.AUTO: 0.2 EU/DL (ref 0.2–1)
VLDLC SERPL CALC-MCNC: 17.4 MG/DL
WBC # BLD AUTO: 5.8 K/UL (ref 4.6–13.2)
WBC URNS QL MICRO: NORMAL /HPF (ref 0–4)

## 2019-09-05 PROCEDURE — 85025 COMPLETE CBC W/AUTO DIFF WBC: CPT

## 2019-09-05 PROCEDURE — 80061 LIPID PANEL: CPT

## 2019-09-05 PROCEDURE — 80048 BASIC METABOLIC PNL TOTAL CA: CPT

## 2019-09-05 PROCEDURE — 82043 UR ALBUMIN QUANTITATIVE: CPT

## 2019-09-05 PROCEDURE — 81001 URINALYSIS AUTO W/SCOPE: CPT

## 2019-09-05 PROCEDURE — 36415 COLL VENOUS BLD VENIPUNCTURE: CPT

## 2019-09-05 PROCEDURE — 83036 HEMOGLOBIN GLYCOSYLATED A1C: CPT

## 2019-09-05 PROCEDURE — 80076 HEPATIC FUNCTION PANEL: CPT

## 2019-09-05 PROCEDURE — 84439 ASSAY OF FREE THYROXINE: CPT

## 2019-09-05 PROCEDURE — 84443 ASSAY THYROID STIM HORMONE: CPT

## 2019-09-11 ENCOUNTER — OFFICE VISIT (OUTPATIENT)
Dept: FAMILY MEDICINE CLINIC | Age: 79
End: 2019-09-11

## 2019-09-11 VITALS
OXYGEN SATURATION: 100 % | WEIGHT: 197 LBS | SYSTOLIC BLOOD PRESSURE: 160 MMHG | HEIGHT: 61 IN | RESPIRATION RATE: 16 BRPM | BODY MASS INDEX: 37.19 KG/M2 | DIASTOLIC BLOOD PRESSURE: 72 MMHG | HEART RATE: 54 BPM | TEMPERATURE: 97.4 F

## 2019-09-11 DIAGNOSIS — Z23 ENCOUNTER FOR IMMUNIZATION: ICD-10-CM

## 2019-09-11 DIAGNOSIS — I10 ESSENTIAL HYPERTENSION: Primary | ICD-10-CM

## 2019-09-11 DIAGNOSIS — J30.89 SEASONAL ALLERGIC RHINITIS DUE TO OTHER ALLERGIC TRIGGER: ICD-10-CM

## 2019-09-11 DIAGNOSIS — R00.1 BRADYCARDIA: ICD-10-CM

## 2019-09-11 DIAGNOSIS — Z00.00 MEDICARE ANNUAL WELLNESS VISIT, SUBSEQUENT: ICD-10-CM

## 2019-09-11 RX ORDER — VALSARTAN 320 MG/1
320 TABLET ORAL DAILY
Qty: 90 TAB | Refills: 0 | Status: SHIPPED | OUTPATIENT
Start: 2019-09-11 | End: 2019-11-18

## 2019-09-11 NOTE — PATIENT INSTRUCTIONS
Medicare Wellness Visit, Female     The best way to live healthy is to have a lifestyle where you eat a well-balanced diet, exercise regularly, limit alcohol use, and quit all forms of tobacco/nicotine, if applicable. Regular preventive services are another way to keep healthy. Preventive services (vaccines, screening tests, monitoring & exams) can help personalize your care plan, which helps you manage your own care. Screening tests can find health problems at the earliest stages, when they are easiest to treat. [facility] follows the current, evidence-based guidelines published by the [facility] (RUSTSTF) when recommending preventive services for our patients. Because we follow these guidelines, sometimes recommendations change over time as research supports it. (For example, mammograms used to be recommended annually. Even though Medicare will still pay for an annual mammogram, the newer guidelines recommend a mammogram every two years for women of average risk.)  Of course, you and your doctor may decide to screen more often for some diseases, based on your risk and your health status. Preventive services for you include:  - Medicare offers their members a free annual wellness visit, which is time for you and your primary care provider to discuss and plan for your preventive service needs. Take advantage of this benefit every year!  -All adults over the age of 72 should receive the recommended pneumonia vaccines. Current USPSTF guidelines recommend a series of two vaccines for the best pneumonia protection.   -All adults should have a flu vaccine yearly and a tetanus vaccine every 10 years. All adults age 61 and older should receive a shingles vaccine once in their lifetime.    -A bone mass density test is recommended when a woman turns 65 to screen for osteoporosis. This test is only recommended one time, as a screening.  Some providers will use this same test as a disease monitoring tool if you already have osteoporosis. -All adults age 38-68 who are overweight should have a diabetes screening test once every three years.   -Other screening tests and preventive services for persons with diabetes include: an eye exam to screen for diabetic retinopathy, a kidney function test, a foot exam, and stricter control over your cholesterol.   -Cardiovascular screening for adults with routine risk involves an electrocardiogram (ECG) at intervals determined by your doctor.   -Colorectal cancer screenings should be done for adults age 54-65 with no increased risk factors for colorectal cancer. There are a number of acceptable methods of screening for this type of cancer. Each test has its own benefits and drawbacks. Discuss with your doctor what is most appropriate for you during your annual wellness visit. The different tests include: colonoscopy (considered the best screening method), a fecal occult blood test, a fecal DNA test, and sigmoidoscopy. -Breast cancer screenings are recommended every other year for women of normal risk, age 54-69.  -Cervical cancer screenings for women over age 72 are only recommended with certain risk factors.   -All adults born between [facility] should be screened once for Hepatitis C.      Here is a list of your current Health Maintenance items (your personalized list of preventive services) with a due date:  Health Maintenance Due   Topic Date Due    Shingles Vaccine (1 of 2) 12/03/1991    Pneumococcal Vaccine (1 of 2 - PCV13) 12/03/2006    Flu Vaccine  08/01/2019    Annual Well Visit  09/08/2019

## 2019-09-11 NOTE — PROGRESS NOTES
Immunization/s administered 9/11/2019 by Hugo Mobley LPN with guardian's consent. Patient tolerated procedure well. No reactions noted. FLUAD left deltoid.

## 2019-09-11 NOTE — PROGRESS NOTES
Leno Patel is a 69 y.o. female (: 1940) presenting to address:    Chief Complaint   Patient presents with    Headache     for a couple of days     Dizziness     started yesterday     Hypertension     bp running high monday 190/75 , today 178/71 at home heart rate 49 and 53    Annual Wellness Visit       Vitals:    19 1110   BP: 182/67   Pulse: (!) 54   Resp: 16   Temp: 97.4 °F (36.3 °C)   TempSrc: Oral   SpO2: 100%   Weight: 197 lb (89.4 kg)   Height: 5' 1\" (1.549 m)   PainSc:   3   PainLoc: Head       Hearing/Vision:      Visual Acuity Screening    Right eye Left eye Both eyes   Without correction: 20/30 20/25 20/25   With correction:          Learning Assessment:     Learning Assessment 2015   PRIMARY LEARNER Patient   HIGHEST LEVEL OF EDUCATION - PRIMARY LEARNER  DID NOT GRADUATE HIGH SCHOOL   BARRIERS PRIMARY LEARNER NONE   CO-LEARNER CAREGIVER No   PRIMARY LANGUAGE ENGLISH    NEED No   LEARNER PREFERENCE PRIMARY DEMONSTRATION   ANSWERED BY Patient   RELATIONSHIP SELF     Depression Screening:     3 most recent PHQ Screens 2019   Little interest or pleasure in doing things Not at all   Feeling down, depressed, irritable, or hopeless Not at all   Total Score PHQ 2 0     Fall Risk Assessment:     Fall Risk Assessment, last 12 mths 2019   Able to walk? Yes   Fall in past 12 months? No   Fall with injury? -   Number of falls in past 12 months -   Fall Risk Score -     Abuse Screening:     Abuse Screening Questionnaire 2018   Do you ever feel afraid of your partner? N   Are you in a relationship with someone who physically or mentally threatens you? N   Is it safe for you to go home? Y     Coordination of Care Questionaire:   1. Have you been to the ER, urgent care clinic since your last visit? Hospitalized since your last visit? NO    2. Have you seen or consulted any other health care providers outside of the [address] [place] [address] since your last visit? Include any pap smears or colon screening. NO    Advanced Directive:   1. Do you have an Advanced Directive? NO    2. Would you like information on Advanced Directives?  NO

## 2019-09-11 NOTE — PROGRESS NOTES
Assessment/Plan:    *Diagnoses and all orders for this visit:    1. Essential hypertension    2. Seasonal allergic rhinitis due to other allergic trigger    3. Bradycardia    4. Medicare annual wellness visit, subsequent    5. Encounter for immunization  -     ADMIN INFLUENZA VIRUS VAC  -     INFLUENZA VACCINE INACTIVATED (IIV), SUBUNIT, ADJUVANTED, IM    Other orders  -     valsartan (DIOVAN) 320 mg tablet; Take 1 Tab by mouth daily. Will incr Diovan to 320 mg. Will monitor at home. Will return in 3-4 weeks for BP repeat. Will call before if having issues. Will try Zyrtec for sinuses. Her bradycardic episodes may be from her Travatan eye drops. She will consult with her ophthal, Dr. Lieutenant Riley. The plan was discussed with the patient. The patient verbalized understanding and is in agreement with the plan. All medication potential side effects were discussed with the patient.    -------------------------------------------------------------------------------------------------------------------        Kevin Bailey is a 69 y.o. female and presents with Headache (for a couple of days ); Dizziness (started yesterday ); Hypertension (bp running high monday 190/75 , today 178/71 at home heart rate 49 and 53); and Annual Wellness Visit         Subjective:  Pt here for f/u. HTN:  Not well controlled. We changed to Diovan and HCTZ since previous med was not working well to control her BP. Has also noted some episodes of low HR at home, in the 40's. Today, it is in the normal range for her. Allergies have been bothering her, runny nose, post nasal drainage. ROS:  Review of Systems - Negative except as above        The problem list was updated as a part of today's visit.   Patient Active Problem List   Diagnosis Code    Iron deficiency anemia D50.9    Type 2 diabetes mellitus without complication, without long-term current use of insulin ([address] E11.9    Blood in stool K92.1    Secondary localized osteoarthrosis, lower leg M17.5    Unspecified otitis media H66.90    BPPV (benign paroxysmal positional vertigo) H81.10    Type 2 diabetes with nephropathy (HCC) E11.21    Severe obesity (BMI 35.0-39. 9) with comorbidity ([facility] [address] E66.01    Colostomy present ([facility] [address] Z93.3    Acute kidney injury ([facility] [address] N17.9    Anemia of chronic disease D63.8    GERD without esophagitis K21.9    Hypoglycemia E16.2    Essential hypertension I10    Hyperkalemia E87.5       The PSH, FH were reviewed. SH:  Social History     Tobacco Use    Smoking status: Never Smoker    Smokeless tobacco: Never Used   Substance Use Topics    Alcohol use: No    Drug use: No       Medications/Allergies:  Current Outpatient Medications on File Prior to Visit   Medication Sig Dispense Refill    VITAMIN D2 50,000 unit capsule take 1 capsule by mouth every 7 days 12 Cap 1    peg 400-propylene glycol (SYSTANE, PROPYLENE GLYCOL,) 0.4-0.3 % drop as needed.  apixaban (ELIQUIS) 2.5 mg tablet Take 1 Tab by mouth two (2) times a day. (Patient taking differently: Take 5 mg by mouth two (2) times a day.) 60 Tab 2    hydroCHLOROthiazide (HYDRODIURIL) 25 mg tablet Take 25 mg by mouth every Monday, Thursday, Saturday. Indications: high blood pressure, Started 03/13/2019/ restarted by Dr Hector Nielson 3/27/2019      travoprost (TRAVATAN Z) 0.004 % ophthalmic solution Administer 1 Drop to both eyes nightly.  albuterol (PROVENTIL HFA, VENTOLIN HFA, PROAIR HFA) 90 mcg/actuation inhaler Take 2 Puffs by inhalation every six (6) hours as needed for Wheezing.  1 Inhaler 3    Blood-Glucose Meter (FREESTYLE LITE METER) monitoring kit For three times a day testing    DX: Z91.89;  E16.2, 1 Kit 0    glucose blood VI test strips (FREESTYLE LITE STRIPS) strip For three times a day testing    DX: Z91.89;  E16.2, 300 Strip 2    lancets (FREESTYLE LANCETS) 28 gauge misc For three times a day testing    DX: Z91.89;  E16.2, 300 Lancet 2    alcohol swabs (ALCOHOL PADS) padm For three times a day testing  DX: Z91.89;  E16.2, 300 Pad 2    iron bg,ps/vitC/B12/FA/calcium (NANCY FORTE PO) Take 1 Tab by mouth daily.  colesevelam (WELCHOL) 625 mg tablet Take 2 Tabs by mouth two (2) times daily (with meals). 360 Tab 3     No current facility-administered medications on file prior to visit. Allergies   Allergen Reactions    Adhesive Other (comments)     Skin tears    Amoxicillin Hives         Health Maintenance:   Health Maintenance   Topic Date Due    Shingrix Vaccine Age 49> (1 of 2) 12/03/1991    Pneumococcal 65+ years (1 of 2 - PCV13) 12/03/2006    Influenza Age 5 to Adult  08/01/2019    HEMOGLOBIN A1C Q6M  03/05/2020    LIPID PANEL Q1  09/05/2020    MEDICARE YEARLY EXAM  09/11/2020    GLAUCOMA SCREENING Q2Y  06/03/2021    DTaP/Tdap/Td series (2 - Td) 08/17/2025    Bone Densitometry (Dexa) Screening  Completed       Objective:  Visit Vitals  /72   Pulse (!) 54   Temp 97.4 °F (36.3 °C) (Oral)   Resp 16   Ht 5' 1\" (1.549 m)   Wt 197 lb (89.4 kg)   SpO2 100%   BMI 37.22 kg/m²          Nurses notes and VS reviewed.       Physical Examination: General appearance - alert, well appearing, and in no distress  Ears - bilateral TM's and external ear canals normal  Mouth - mucous membranes moist, pharynx normal without lesions  Neck - supple, no significant adenopathy  Chest - clear to auscultation, no wheezes, rales or rhonchi, symmetric air entry  Heart - normal rate, regular rhythm, normal S1, S2, no murmurs, rubs, clicks or gallops  Abdomen - soft, nontender, nondistended, no masses or organomegaly  Musculoskeletal - no joint tenderness, deformity or swelling  Extremities - peripheral pulses normal, no pedal edema, no clubbing or cyanosis          Labwork and Ancillary Studies:    CBC w/Diff  Lab Results   Component Value Date/Time    WBC 5.8 09/05/2019 09:57 AM    HGB 11.4 (L) 09/05/2019 09:57 AM    PLATELET 505 49/51/3107 09:57 AM         Basic Metabolic Profile  Lab Results   Component Value Date/Time    Sodium 138 09/05/2019 09:57 AM    Potassium 4.9 09/05/2019 09:57 AM    Chloride 106 09/05/2019 09:57 AM    CO2 29 09/05/2019 09:57 AM    Anion gap 3 09/05/2019 09:57 AM    Glucose 86 09/05/2019 09:57 AM    BUN 59 (H) 09/05/2019 09:57 AM    Creatinine 1.61 (H) 09/05/2019 09:57 AM    BUN/Creatinine ratio 37 (H) 09/05/2019 09:57 AM    GFR est AA 38 (L) 09/05/2019 09:57 AM    GFR est non-AA 31 (L) 09/05/2019 09:57 AM    Calcium 9.3 09/05/2019 09:57 AM         LFT  Lab Results   Component Value Date/Time    ALT (SGPT) 21 09/05/2019 09:57 AM    AST (SGOT) 17 09/05/2019 09:57 AM    Alk.  phosphatase 107 09/05/2019 09:57 AM    Bilirubin, direct 0.1 09/05/2019 09:57 AM    Bilirubin, total 0.4 09/05/2019 09:57 AM         Cholesterol  Lab Results   Component Value Date/Time    Cholesterol, total 207 (H) 09/05/2019 09:57 AM    HDL Cholesterol 92 (H) 09/05/2019 09:57 AM    LDL, calculated 97.6 09/05/2019 09:57 AM    Triglyceride 87 09/05/2019 09:57 AM    CHOL/HDL Ratio 2.3 09/05/2019 09:57 AM

## 2019-09-11 NOTE — PROGRESS NOTES
This is the Subsequent Medicare Annual Wellness Exam, performed 12 months or more after the Initial AWV or the last Subsequent AWV    I have reviewed the patient's medical history in detail and updated the computerized patient record. History     Past Medical History:   Diagnosis Date    Cancer [facility])     colon    Colostomy present ([facility] [address]     Diabetes ([facility] [address]     Hypertension       Past Surgical History:   Procedure Laterality Date    ABDOMEN SURGERY PROC UNLISTED      colectomy    ENDOSCOPY, COLON, DIAGNOSTIC       Current Outpatient Medications   Medication Sig Dispense Refill    valsartan (DIOVAN) 320 mg tablet Take 1 Tab by mouth daily. 90 Tab 0    VITAMIN D2 50,000 unit capsule take 1 capsule by mouth every 7 days 12 Cap 1    peg 400-propylene glycol (SYSTANE, PROPYLENE GLYCOL,) 0.4-0.3 % drop as needed.  apixaban (ELIQUIS) 2.5 mg tablet Take 1 Tab by mouth two (2) times a day. (Patient taking differently: Take 5 mg by mouth two (2) times a day.) 60 Tab 2    hydroCHLOROthiazide (HYDRODIURIL) 25 mg tablet Take 25 mg by mouth every Monday, Thursday, Saturday. Indications: high blood pressure, Started 03/13/2019/ restarted by Dr Casie Larkin 3/27/2019      travoprost (TRAVATAN Z) 0.004 % ophthalmic solution Administer 1 Drop to both eyes nightly.  albuterol (PROVENTIL HFA, VENTOLIN HFA, PROAIR HFA) 90 mcg/actuation inhaler Take 2 Puffs by inhalation every six (6) hours as needed for Wheezing.  1 Inhaler 3    Blood-Glucose Meter (FREESTYLE LITE METER) monitoring kit For three times a day testing    DX: Z91.89;  E16.2, 1 Kit 0    glucose blood VI test strips (FREESTYLE LITE STRIPS) strip For three times a day testing    DX: Z91.89;  E16.2, 300 Strip 2    lancets (FREESTYLE LANCETS) 28 gauge misc For three times a day testing    DX: Z91.89;  E16.2, 300 Lancet 2    alcohol swabs (ALCOHOL PADS) padm For three times a day testing  DX: Z91.89;  E16.2, 300 Pad 2    iron bg,ps/vitC/B12/FA/calcium (NANCY FORTE PO) Take 1 Tab by mouth daily.  colesevelam (WELCHOL) 625 mg tablet Take 2 Tabs by mouth two (2) times daily (with meals). 360 Tab 3     Allergies   Allergen Reactions    Adhesive Other (comments)     Skin tears    Amoxicillin Hives     History reviewed. No pertinent family history. 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 9) with comorbidity ([facility] [address] E66.01    Colostomy present ([facility] [address] Z93.3    Acute kidney injury ([facility] [address] N17.9    Anemia of chronic disease D63.8    GERD without esophagitis K21.9    Hypoglycemia E16.2    Essential hypertension I10    Hyperkalemia E87.5       Depression Risk Factor Screening:     3 most recent PHQ Screens 9/11/2019   Little interest or pleasure in doing things Not at all   Feeling down, depressed, irritable, or hopeless Not at all   Total Score PHQ 2 0     Alcohol Risk Factor Screening: You do not drink alcohol or very rarely. Functional Ability and Level of Safety:   Hearing Loss  Hearing is good. Activities of Daily Living  The home contains: no safety equipment. Patient does total self care    Fall Risk  Fall Risk Assessment, last 12 mths 9/11/2019   Able to walk? Yes   Fall in past 12 months?  No   Fall with injury? -   Number of falls in past 12 months -   Fall Risk Score -       Abuse Screen  Patient is not abused    Cognitive Screening   Evaluation of Cognitive Function:  Has your family/caregiver stated any concerns about your memory: no  Normal    Patient Care Team   Patient Care Team:  Sara Vieyra MD as PCP - General (Internal Medicine)  Ishan Bae MD as Consulting Provider (Gastroenterology)  Anne Gomez MD as Consulting Provider (Ophthalmology)  Imer Miles DPM as Consulting Provider (Podiatry)  Charlie Romo MD (Inactive) as Consulting Provider (Orthopedic Surgery)  Imer Herrera MD (Orthopedic Surgery)    Assessment/Plan   Education and counseling provided:  Are appropriate based on today's review and evaluation    Diagnoses and all orders for this visit:    1. Medicare annual wellness visit, subsequent    2. Seasonal allergic rhinitis due to other allergic trigger    3. Essential hypertension    4. Bradycardia    5. Encounter for immunization  -     ADMIN INFLUENZA VIRUS VAC  -     INFLUENZA VACCINE INACTIVATED (IIV), SUBUNIT, ADJUVANTED, IM    Other orders  -     valsartan (DIOVAN) 320 mg tablet; Take 1 Tab by mouth daily.         Health Maintenance Due   Topic Date Due    Shingrix Vaccine Age 49> (1 of 2) 12/03/1991    Pneumococcal 65+ years (1 of 2 - PCV13) 12/03/2006    Influenza Age 5 to Adult  08/01/2019

## 2019-10-22 ENCOUNTER — OFFICE VISIT (OUTPATIENT)
Dept: FAMILY MEDICINE CLINIC | Age: 79
End: 2019-10-22

## 2019-10-22 VITALS
TEMPERATURE: 97.4 F | HEART RATE: 53 BPM | WEIGHT: 196 LBS | RESPIRATION RATE: 16 BRPM | HEIGHT: 61 IN | SYSTOLIC BLOOD PRESSURE: 122 MMHG | BODY MASS INDEX: 37 KG/M2 | DIASTOLIC BLOOD PRESSURE: 68 MMHG

## 2019-10-22 DIAGNOSIS — H10.33 ACUTE CONJUNCTIVITIS OF BOTH EYES, UNSPECIFIED ACUTE CONJUNCTIVITIS TYPE: ICD-10-CM

## 2019-10-22 DIAGNOSIS — I10 ESSENTIAL HYPERTENSION: Primary | ICD-10-CM

## 2019-10-22 RX ORDER — PRAZOSIN HYDROCHLORIDE 1 MG/1
1 CAPSULE ORAL
Qty: 90 CAP | Refills: 1 | Status: SHIPPED | OUTPATIENT
Start: 2019-10-22 | End: 2019-10-22

## 2019-10-22 RX ORDER — TOBRAMYCIN 3 MG/ML
1 SOLUTION/ DROPS OPHTHALMIC EVERY 6 HOURS
Qty: 1 BOTTLE | Refills: 0 | Status: SHIPPED | OUTPATIENT
Start: 2019-10-22 | End: 2019-10-29

## 2019-10-22 NOTE — PROGRESS NOTES
Assessment/Plan:    *Diagnoses and all orders for this visit:    1. Essential hypertension    2. Acute conjunctivitis of both eyes, unspecified acute conjunctivitis type  -     tobramycin (TOBREX) 0.3 % ophthalmic solution; Administer 1 Drop to both eyes every six (6) hours for 7 days. After sending in the new BP med, at the end of the visit, we rechecked her BP and it was down to normal range. She will continue to monitor her BP BUT will NOT just start taking the prazosin yet. She will see how her readings are at home and will return in 3 weeks with readings and machine so we can verify its accuracy. I do not want to risk her becoming hypotensive. F/u 3 weeks. The plan was discussed with the patient. The patient verbalized understanding and is in agreement with the plan. All medication potential side effects were discussed with the patient.    -------------------------------------------------------------------------------------------------------------------        Ruth Sparrow is a 69 y.o. female and presents with Red Eye (eye's have a crust when getting up in the morning . ) and Hypertension         Subjective:  Pt returns today for f/u of her HTN. Last visit, we increased her Diovan to 320 mg. Her reading is still not controlled. Also having redness, itching, watering and crusting along the eyelashes of both eyes x 1 week. No change in vision. Has not seen her eye doctor yet for this issue. ROS:  Review of Systems - Negative except as above        The problem list was updated as a part of today's visit.   Patient Active Problem List   Diagnosis Code    Iron deficiency anemia D50.9    Type 2 diabetes mellitus without complication, without long-term current use of insulin ([facility] [address] E11.9    Blood in stool K92.1    Secondary localized osteoarthrosis, lower leg M17.5    Unspecified otitis media H66.90    BPPV (benign paroxysmal positional vertigo) H81.10    Type 2 diabetes with nephropathy (HCC) E11.21    Severe obesity (BMI 35.0-39. 9) with comorbidity ([facility] [address] E66.01    Colostomy present ([facility] [address] Z93.3    Acute kidney injury ([facility] [address] N17.9    Anemia of chronic disease D63.8    GERD without esophagitis K21.9    Hypoglycemia E16.2    Essential hypertension I10    Hyperkalemia E87.5       The PSH, FH were reviewed. SH:  Social History     Tobacco Use    Smoking status: Never Smoker    Smokeless tobacco: Never Used   Substance Use Topics    Alcohol use: No    Drug use: No       Medications/Allergies:  Current Outpatient Medications on File Prior to Visit   Medication Sig Dispense Refill    brimonidine (ALPHAGAN P) 0.1 % ophthalmic solution Administer 1 Drop to both eyes daily.  valsartan (DIOVAN) 320 mg tablet Take 1 Tab by mouth daily. 90 Tab 0    VITAMIN D2 50,000 unit capsule take 1 capsule by mouth every 7 days 12 Cap 1    peg 400-propylene glycol (SYSTANE, PROPYLENE GLYCOL,) 0.4-0.3 % drop as needed.  apixaban (ELIQUIS) 2.5 mg tablet Take 1 Tab by mouth two (2) times a day. (Patient taking differently: Take 5 mg by mouth two (2) times a day.) 60 Tab 2    hydroCHLOROthiazide (HYDRODIURIL) 25 mg tablet Take 25 mg by mouth every Monday, Thursday, Saturday. Indications: high blood pressure, Started 03/13/2019/ restarted by Dr Monet Reyes 3/27/2019      travoprost (TRAVATAN Z) 0.004 % ophthalmic solution Administer 1 Drop to both eyes nightly.  albuterol (PROVENTIL HFA, VENTOLIN HFA, PROAIR HFA) 90 mcg/actuation inhaler Take 2 Puffs by inhalation every six (6) hours as needed for Wheezing.  1 Inhaler 3    Blood-Glucose Meter (FREESTYLE LITE METER) monitoring kit For three times a day testing    DX: Z91.89;  E16.2, 1 Kit 0    glucose blood VI test strips (FREESTYLE LITE STRIPS) strip For three times a day testing    DX: Z91.89;  E16.2, 300 Strip 2    lancets (FREESTYLE LANCETS) 28 gauge misc For three times a day testing    DX: Z91.89;  E16.2, 300 Lancet 2    alcohol swabs (ALCOHOL PADS) padm For three times a day testing  DX: Z91.89;  E16.2, 300 Pad 2    iron bg,ps/vitC/B12/FA/calcium (NANCY FORTE PO) Take 1 Tab by mouth daily.  colesevelam (WELCHOL) 625 mg tablet Take 2 Tabs by mouth two (2) times daily (with meals). 360 Tab 3     No current facility-administered medications on file prior to visit. Allergies   Allergen Reactions    Adhesive Other (comments)     Skin tears    Amoxicillin Hives         Health Maintenance:   Health Maintenance   Topic Date Due    Shingrix Vaccine Age 49> (1 of 2) 12/03/1991    Pneumococcal 65+ years (1 of 2 - PCV13) 12/03/2006    HEMOGLOBIN A1C Q6M  03/05/2020    LIPID PANEL Q1  09/05/2020    MEDICARE YEARLY EXAM  09/11/2020    GLAUCOMA SCREENING Q2Y  06/03/2021    DTaP/Tdap/Td series (2 - Td) 08/17/2025    Bone Densitometry (Dexa) Screening  Completed    Influenza Age 9 to Adult  Completed       Objective:  Visit Vitals  /68   Pulse (!) 53   Temp 97.4 °F (36.3 °C) (Oral)   Resp 16   Ht 5' 1\" (1.549 m)   Wt 196 lb (88.9 kg)   BMI 37.03 kg/m²          Nurses notes and VS reviewed.       Physical Examination: General appearance - alert, well appearing, and in no distress  Chest - clear to auscultation, no wheezes, rales or rhonchi, symmetric air entry  Heart - normal rate, regular rhythm, normal S1, S2, no murmurs, rubs, clicks or gallops          Labwork and Ancillary Studies:    CBC w/Diff  Lab Results   Component Value Date/Time    WBC 5.8 09/05/2019 09:57 AM    HGB 11.4 (L) 09/05/2019 09:57 AM    PLATELET 491 10/43/6588 09:57 AM         Basic Metabolic Profile  Lab Results   Component Value Date/Time    Sodium 138 09/05/2019 09:57 AM    Potassium 4.9 09/05/2019 09:57 AM    Chloride 106 09/05/2019 09:57 AM    CO2 29 09/05/2019 09:57 AM    Anion gap 3 09/05/2019 09:57 AM    Glucose 86 09/05/2019 09:57 AM    BUN 59 (H) 09/05/2019 09:57 AM    Creatinine 1.61 (H) 09/05/2019 09:57 AM    BUN/Creatinine ratio 37 (H) 09/05/2019 09:57 AM    GFR est AA 38 (L) 09/05/2019 09:57 AM    GFR est non-AA 31 (L) 09/05/2019 09:57 AM    Calcium 9.3 09/05/2019 09:57 AM         LFT  Lab Results   Component Value Date/Time    ALT (SGPT) 21 09/05/2019 09:57 AM    AST (SGOT) 17 09/05/2019 09:57 AM    Alk.  phosphatase 107 09/05/2019 09:57 AM    Bilirubin, direct 0.1 09/05/2019 09:57 AM    Bilirubin, total 0.4 09/05/2019 09:57 AM         Cholesterol  Lab Results   Component Value Date/Time    Cholesterol, total 207 (H) 09/05/2019 09:57 AM    HDL Cholesterol 92 (H) 09/05/2019 09:57 AM    LDL, calculated 97.6 09/05/2019 09:57 AM    Triglyceride 87 09/05/2019 09:57 AM    CHOL/HDL Ratio 2.3 09/05/2019 09:57 AM

## 2019-10-22 NOTE — PATIENT INSTRUCTIONS
High Blood Pressure: Care Instructions Overview It's normal for blood pressure to go up and down throughout the day. But if it stays up, you have high blood pressure. Another name for high blood pressure is hypertension. Despite what a lot of people think, high blood pressure usually doesn't cause headaches or make you feel dizzy or lightheaded. It usually has no symptoms. But it does increase your risk of stroke, heart attack, and other problems. You and your doctor will talk about your risks of these problems based on your blood pressure. Your doctor will give you a goal for your blood pressure. Your goal will be based on your health and your age. Lifestyle changes, such as eating healthy and being active, are always important to help lower blood pressure. You might also take medicine to reach your blood pressure goal. 
Follow-up care is a key part of your treatment and safety. Be sure to make and go to all appointments, and call your doctor if you are having problems. It's also a good idea to know your test results and keep a list of the medicines you take. How can you care for yourself at home? Medical treatment · If you stop taking your medicine, your blood pressure will go back up. You may take one or more types of medicine to lower your blood pressure. Be safe with medicines. Take your medicine exactly as prescribed. Call your doctor if you think you are having a problem with your medicine. · Talk to your doctor before you start taking aspirin every day. Aspirin can help certain people lower their risk of a heart attack or stroke. But taking aspirin isn't right for everyone, because it can cause serious bleeding. · See your doctor regularly. You may need to see the doctor more often at first or until your blood pressure comes down. · If you are taking blood pressure medicine, talk to your doctor before you take decongestants or anti-inflammatory medicine, such as ibuprofen. Some of these medicines can raise blood pressure. · Learn how to check your blood pressure at home. Lifestyle changes · Stay at a healthy weight. This is especially important if you put on weight around the waist. Losing even 10 pounds can help you lower your blood pressure. · If your doctor recommends it, get more exercise. Walking is a good choice. Bit by bit, increase the amount you walk every day. Try for at least 30 minutes on most days of the week. You also may want to swim, bike, or do other activities. · Avoid or limit alcohol. Talk to your doctor about whether you can drink any alcohol. · Try to limit how much sodium you eat to less than 2,300 milligrams (mg) a day. Your doctor may ask you to try to eat less than 1,500 mg a day. · Eat plenty of fruits (such as bananas and oranges), vegetables, legumes, whole grains, and low-fat dairy products. · Lower the amount of saturated fat in your diet. Saturated fat is found in animal products such as milk, cheese, and meat. Limiting these foods may help you lose weight and also lower your risk for heart disease. · Do not smoke. Smoking increases your risk for heart attack and stroke. If you need help quitting, talk to your doctor about stop-smoking programs and medicines. These can increase your chances of quitting for good. When should you call for help? Call  911 anytime you think you may need emergency care. This may mean having symptoms that suggest that your blood pressure is causing a serious heart or blood vessel problem. Your blood pressure may be over 180/120. 
 For example, call  911 if: 
  · You have symptoms of a heart attack. These may include: 
? Chest pain or pressure, or a strange feeling in the chest. 
? Sweating. ? Shortness of breath. ? Nausea or vomiting. ? Pain, pressure, or a strange feeling in the back, neck, jaw, or upper belly or in one or both shoulders or arms. ? Lightheadedness or sudden weakness. ? A fast or irregular heartbeat.  
  · You have symptoms of a stroke. These may include: 
? Sudden numbness, tingling, weakness, or loss of movement in your face, arm, or leg, especially on only one side of your body. ? Sudden vision changes. ? Sudden trouble speaking. ? Sudden confusion or trouble understanding simple statements. ? Sudden problems with walking or balance. ? A sudden, severe headache that is different from past headaches.  
  · You have severe back or belly pain.  
 Do not wait until your blood pressure comes down on its own. Get help right away. 
 Call your doctor now or seek immediate care if: 
  · Your blood pressure is much higher than normal (such as 180/120 or higher), but you don't have symptoms.  
  · You think high blood pressure is causing symptoms, such as: 
? Severe headache. 
? Blurry vision.  
 Watch closely for changes in your health, and be sure to contact your doctor if: 
  · Your blood pressure measures higher than your doctor recommends at least 2 times. That means the top number is higher or the bottom number is higher, or both.  
  · You think you may be having side effects from your blood pressure medicine. Where can you learn more? Go to http://liliam-adrianna.info/. Enter U607 in the search box to learn more about \"High Blood Pressure: Care Instructions. \" Current as of: April 9, 2019 Content Version: 12.2 © 2895-8028 [employer]. Care instructions adapted under license by [employer] (which disclaims liability or warranty for this information). If you have questions about a medical condition or this instruction, always ask your healthcare professional. [place] [address] any warranty or liability for your use of this information.

## 2019-10-22 NOTE — PROGRESS NOTES
Della Sy is a 69 y.o. female (: 1940) presenting to address:    Chief Complaint   Patient presents with    Red Eye     eye's have a crust when getting up in the morning .  Hypertension       Vitals:    10/22/19 0812   BP: 167/60   Pulse: (!) 53   Resp: 16   Temp: 97.4 °F (36.3 °C)   TempSrc: Oral   Weight: 196 lb (88.9 kg)   Height: 5' 1\" (1.549 m)   PainSc:   0 - No pain       Hearing/Vision:   No exam data present    Learning Assessment:     Learning Assessment 2015   PRIMARY LEARNER Patient   HIGHEST LEVEL OF EDUCATION - PRIMARY LEARNER  DID NOT GRADUATE HIGH SCHOOL   BARRIERS PRIMARY LEARNER NONE   CO-LEARNER CAREGIVER No   PRIMARY LANGUAGE ENGLISH    NEED No   LEARNER PREFERENCE PRIMARY DEMONSTRATION   ANSWERED BY Patient   RELATIONSHIP SELF     Depression Screening:     3 most recent PHQ Screens 2019   Little interest or pleasure in doing things Not at all   Feeling down, depressed, irritable, or hopeless Not at all   Total Score PHQ 2 0     Fall Risk Assessment:     Fall Risk Assessment, last 12 mths 10/22/2019   Able to walk? Yes   Fall in past 12 months? No   Fall with injury? -   Number of falls in past 12 months -   Fall Risk Score -     Abuse Screening:     Abuse Screening Questionnaire 2018   Do you ever feel afraid of your partner? N   Are you in a relationship with someone who physically or mentally threatens you? N   Is it safe for you to go home? Y     Coordination of Care Questionaire:   1. Have you been to the ER, urgent care clinic since your last visit? Hospitalized since your last visit? NO    2. Have you seen or consulted any other health care providers outside of the [facility] since your last visit? Include any pap smears or colon screening. NO    Advanced Directive:   1. Do you have an Advanced Directive? NO    2. Would you like information on Advanced Directives?  NO

## 2019-11-11 ENCOUNTER — TELEPHONE (OUTPATIENT)
Dept: FAMILY MEDICINE CLINIC | Age: 79
End: 2019-11-11

## 2019-11-11 NOTE — TELEPHONE ENCOUNTER
Patient called to inform Dr Latha Green that she is going to [facility] based on the advice of her cardiologist

## 2019-11-18 ENCOUNTER — PATIENT OUTREACH (OUTPATIENT)
Dept: FAMILY MEDICINE CLINIC | Age: 79
End: 2019-11-18

## 2019-11-18 RX ORDER — ONDANSETRON 4 MG/1
4 TABLET, ORALLY DISINTEGRATING ORAL
COMMUNITY
Start: 2019-08-14 | End: 2020-03-18 | Stop reason: ALTCHOICE

## 2019-11-18 RX ORDER — LOSARTAN POTASSIUM 25 MG/1
50 TABLET ORAL DAILY
COMMUNITY
Start: 2019-11-16 | End: 2020-01-06 | Stop reason: SDUPTHER

## 2019-11-18 RX ORDER — AMIODARONE HYDROCHLORIDE 200 MG/1
200 TABLET ORAL DAILY
COMMUNITY
Start: 2019-11-16 | End: 2020-10-28 | Stop reason: SDUPTHER

## 2019-11-18 NOTE — PROGRESS NOTES
Hospital Discharge Follow-Up      Date/Time:  2019 4:57 PM    Patient was admitted to [facility] on 19 and discharged on 11/15/19 for AFIB. The physician discharge summary was available at the time of outreach. Patient was contacted within 1 business days of discharge. Top Challenges reviewed with the provider   Patient reports:   · Feeling much better  Patient denies:  · SOB   · Chest pain  · Lightheadedness  Patient aware of when to call PCP or 911  Advance Care Planning:   Does patient have an Advance Directive:  not on file        Method of communication with provider :chart routing    Inpatient RRAT score: Medium Risk            14       Total Score        14 Charlson Comorbidity Score (Age + Comorbid Conditions)        Criteria that do not apply:    Has Seen PCP in Last 6 Months (Yes=3, No=0)    . Living with Significant Other. Assisted Living. LTAC. SNF. or   Rehab    Patient Length of Stay (>5 days = 3)    IP Visits Last 12 Months (1-3=4, 4=9, >4=11)    Pt. Coverage (Medicare=5 , Medicaid, or Self-Pay=4)      Was this a readmission? no   Patient stated reason for the readmission: n/a    Care Transition Nurse (CTN) contacted the patient by telephone to perform post hospital discharge assessment. Verified name and  with patient as identifiers. Provided introduction to self, and explanation of the CTN role. Patient received hospital discharge instructions. CTN reviewed discharge instructions and red flags with patient who verbalized understanding. Patient given an opportunity to ask questions and does not have any further questions or concerns at this time. The patient agrees to contact the PCP office for questions related to their healthcare. CTN provided contact information for future reference.     Disease Specific:   N/A    Patients top risk factors for readmission:  no barriers to health at this time    Home Health orders at discharge: none    Durable Medical Equipment ordered at discharge: none    Medication(s):   New Medications at Discharge: amioderone, losartan  Changed Medications at Discharge: none  Discontinued Medications at Discharge: diovan    Medication reconciliation was performed with patient, who verbalizes understanding of administration of home medications. There were no barriers to obtaining medications identified at this time. Referral to Pharm D needed: no     Current Outpatient Medications   Medication Sig    amiodarone (CORDARONE) 200 mg tablet Take 200 mg by mouth daily.  losartan (COZAAR) 25 mg tablet Take 25 mg by mouth daily.  ondansetron (ZOFRAN ODT) 4 mg disintegrating tablet Take 4 mg by mouth every eight (8) hours as needed.  brimonidine (ALPHAGAN P) 0.1 % ophthalmic solution Administer 1 Drop to both eyes daily.  VITAMIN D2 50,000 unit capsule take 1 capsule by mouth every 7 days    peg 400-propylene glycol (SYSTANE, PROPYLENE GLYCOL,) 0.4-0.3 % drop as needed.  apixaban (ELIQUIS) 2.5 mg tablet Take 1 Tab by mouth two (2) times a day. (Patient taking differently: Take 5 mg by mouth two (2) times a day.)    travoprost (TRAVATAN Z) 0.004 % ophthalmic solution Administer 1 Drop to both eyes nightly.  albuterol (PROVENTIL HFA, VENTOLIN HFA, PROAIR HFA) 90 mcg/actuation inhaler Take 2 Puffs by inhalation every six (6) hours as needed for Wheezing.  iron bg,ps/vitC/B12/FA/calcium (NANCY FORTE PO) Take 1 Tab by mouth daily.  colesevelam (WELCHOL) 625 mg tablet Take 2 Tabs by mouth two (2) times daily (with meals).  hydroCHLOROthiazide (HYDRODIURIL) 25 mg tablet Take 25 mg by mouth every Monday, Thursday, Saturday.  Indications: high blood pressure, Started 03/13/2019/ restarted by Dr Rich Hopkins 3/27/2019    Blood-Glucose Meter (FREESTYLE LITE METER) monitoring kit For three times a day testing    DX: Z91.89;  E16.2,    glucose blood VI test strips (FREESTYLE LITE STRIPS) strip For three times a day testing    DX: Z91.89;  E16.2,    lancets (FREESTYLE LANCETS) 28 gauge misc For three times a day testing    DX: Z91.89;  E16.2,    alcohol swabs (ALCOHOL PADS) padm For three times a day testing  DX: Z91.89;  E16.2,     No current facility-administered medications for this visit. Medications Discontinued During This Encounter   Medication Reason    valsartan (DIOVAN) 320 mg tablet Discontinued at Discharge       [facility] follow up appointment(s):   Future Appointments   Date Time Provider Tavo Vick   11/20/2019  8:00 AM Prashanth Bhardwaj MD [facility]      Non-[facility] follow up appointment(s): cardiology appointment 11/21/19  [facility]:  out of service area       Goals      Attends follow-up appointments as directed.  Prevent complications post hospitalization. 1. CTN will monitor X 4 weeks    2. Ensure provider appt is scheduled within 7 days post-discharge    3. Confirm patient attended post-discharge provider apt    4. Complete post-visit call to confirm attendance and update care needs      5. Review/educate common or potential \"red flags\" of condition worsening    6. Evaluate adherence to medications and priority barriers to resolve        7. Instruct on adherence to medications as ordered and assess for therapeutic response and side-effects         8. Discuss and evaluate ADL performance. Provide recommendations on energy conservation, particularly related to transition home from an inpatient admission.

## 2019-11-20 ENCOUNTER — OFFICE VISIT (OUTPATIENT)
Dept: FAMILY MEDICINE CLINIC | Age: 79
End: 2019-11-20

## 2019-11-20 VITALS
WEIGHT: 199 LBS | HEART RATE: 53 BPM | TEMPERATURE: 97.3 F | RESPIRATION RATE: 16 BRPM | SYSTOLIC BLOOD PRESSURE: 170 MMHG | BODY MASS INDEX: 37.57 KG/M2 | HEIGHT: 61 IN | DIASTOLIC BLOOD PRESSURE: 90 MMHG

## 2019-11-20 DIAGNOSIS — I48.0 PAROXYSMAL ATRIAL FIBRILLATION (HCC): ICD-10-CM

## 2019-11-20 DIAGNOSIS — R00.1 BRADYCARDIA: ICD-10-CM

## 2019-11-20 DIAGNOSIS — I10 ESSENTIAL HYPERTENSION: Primary | ICD-10-CM

## 2019-11-20 RX ORDER — AZELASTINE HYDROCHLORIDE 0.5 MG/ML
SOLUTION/ DROPS OPHTHALMIC 2 TIMES DAILY
COMMUNITY
End: 2020-02-19 | Stop reason: ALTCHOICE

## 2019-11-20 RX ORDER — MAGNESIUM SULFATE 100 %
15 CRYSTALS MISCELLANEOUS AS NEEDED
Qty: 30 TAB | Refills: 3 | Status: SHIPPED | OUTPATIENT
Start: 2019-11-20 | End: 2020-03-18 | Stop reason: ALTCHOICE

## 2019-11-20 NOTE — PROGRESS NOTES
Assessment/Plan:    *Diagnoses and all orders for this visit:    1. Essential hypertension    2. Paroxysmal atrial fibrillation (HCC)    3. Bradycardia    Other orders  -     glucose 4 gram chewable tablet; Take 4 Tabs by mouth as needed (when blood glucose is low). The plan was discussed with the patient. The patient verbalized understanding and is in agreement with the plan. All medication potential side effects were discussed with the patient.    -------------------------------------------------------------------------------------------------------------------        Meera Frederick is a 69 y.o. female and presents with Hospital Follow Up         Subjective:  Pt here for a JESSICA visit. A pt outreach was made on 11/18/19 by Corry Jackson. She wa admitted from 11/11/19 to 11/15/19 for Atrial Fib with RVR. She is better now. They cardioverted her. She experienced bradycardia and hypotension, received IB fluid bolus and atropine. This helped and cards determined that she did not need a pacemaker. She was also DC cardioverted in une 2019. She has no complaints today. HTN: elevated b/c she got mixed up and thought she was to stop the Losartan when in fact she was supposed to continue this. She has started back today. ROS:  Review of Systems - Negative except as above        The problem list was updated as a part of today's visit. 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 9) with comorbidity ([address] E66.01    Colostomy present ([facility] Z93.3    Acute kidney injury ([facility] N17.9    Anemia of chronic disease D63.8    GERD without esophagitis K21.9    Hypoglycemia E16.2    Essential hypertension I10    Hyperkalemia E87.5       The PS,  were reviewed. SH:  Social History     Tobacco Use    Smoking status: Never Smoker    Smokeless tobacco: Never Used   Substance Use Topics    Alcohol use: No    Drug use: No       Medications/Allergies:  Current Outpatient Medications on File Prior to Visit   Medication Sig Dispense Refill    azelastine (OPTIVAR) 0.05 % ophthalmic solution Administer  to both eyes two (2) times a day. Use in affected eye(s)      amiodarone (CORDARONE) 200 mg tablet Take 200 mg by mouth daily.  losartan (COZAAR) 25 mg tablet Take 25 mg by mouth daily.  brimonidine (ALPHAGAN P) 0.1 % ophthalmic solution Administer 1 Drop to both eyes daily.  VITAMIN D2 50,000 unit capsule take 1 capsule by mouth every 7 days 12 Cap 1    peg 400-propylene glycol (SYSTANE, PROPYLENE GLYCOL,) 0.4-0.3 % drop as needed.  apixaban (ELIQUIS) 2.5 mg tablet Take 1 Tab by mouth two (2) times a day. (Patient taking differently: Take 5 mg by mouth two (2) times a day.) 60 Tab 2    travoprost (TRAVATAN Z) 0.004 % ophthalmic solution Administer 1 Drop to both eyes nightly.  albuterol (PROVENTIL HFA, VENTOLIN HFA, PROAIR HFA) 90 mcg/actuation inhaler Take 2 Puffs by inhalation every six (6) hours as needed for Wheezing. 1 Inhaler 3    Blood-Glucose Meter (FREESTYLE LITE METER) monitoring kit For three times a day testing    DX: Z91.89;  E16.2, 1 Kit 0    glucose blood VI test strips (FREESTYLE LITE STRIPS) strip For three times a day testing    DX: Z91.89;  E16.2, 300 Strip 2    lancets (FREESTYLE LANCETS) 28 gauge misc For three times a day testing    DX: Z91.89;  E16.2, 300 Lancet 2    alcohol swabs (ALCOHOL PADS) padm For three times a day testing  DX: Z91.89;  E16.2, 300 Pad 2    iron bg,ps/vitC/B12/FA/calcium (NANCY FORTE PO) Take 1 Tab by mouth daily.       colesevelam (WELCHOL) 625 mg tablet Take 2 Tabs by mouth two (2) times daily (with meals). 360 Tab 3    ondansetron (ZOFRAN ODT) 4 mg disintegrating tablet Take 4 mg by mouth every eight (8) hours as needed.  hydroCHLOROthiazide (HYDRODIURIL) 25 mg tablet Take 25 mg by mouth every Monday, Thursday, Saturday. Indications: high blood pressure, Started 03/13/2019/ restarted by Dr Bhakti Kendrick 3/27/2019       No current facility-administered medications on file prior to visit. Allergies   Allergen Reactions    Adhesive Other (comments)     Skin tears    Amoxicillin Hives         Health Maintenance:   Health Maintenance   Topic Date Due    Shingrix Vaccine Age 49> (1 of 2) 12/03/1991    Pneumococcal 65+ years (1 of 1 - PPSV23) 12/03/2006    HEMOGLOBIN A1C Q6M  03/05/2020    LIPID PANEL Q1  09/05/2020    MEDICARE YEARLY EXAM  09/11/2020    GLAUCOMA SCREENING Q2Y  06/03/2021    DTaP/Tdap/Td series (2 - Td) 08/17/2025    Bone Densitometry (Dexa) Screening  Completed    Influenza Age 9 to Adult  Completed       Objective:  Visit Vitals  /90   Pulse (!) 53   Temp 97.3 °F (36.3 °C) (Oral)   Resp 16   Ht 5' 1\" (1.549 m)   Wt 199 lb (90.3 kg)   BMI 37.60 kg/m²          Nurses notes and VS reviewed.       Physical Examination: General appearance - alert, well appearing, and in no distress  Chest - clear to auscultation, no wheezes, rales or rhonchi, symmetric air entry  Heart - normal rate, regular rhythm, normal S1, S2, no murmurs, rubs, clicks or gallops          Labwork and Ancillary Studies:    CBC w/Diff  Lab Results   Component Value Date/Time    WBC 5.8 09/05/2019 09:57 AM    HGB 11.4 (L) 09/05/2019 09:57 AM    PLATELET 705 15/79/0179 09:57 AM         Basic Metabolic Profile  Lab Results   Component Value Date/Time    Sodium 138 09/05/2019 09:57 AM    Potassium 4.9 09/05/2019 09:57 AM    Chloride 106 09/05/2019 09:57 AM    CO2 29 09/05/2019 09:57 AM    Anion gap 3 09/05/2019 09:57 AM    Glucose 86 09/05/2019 09:57 AM    BUN 59 (H) 09/05/2019 09:57 AM    Creatinine 1.61 (H) 09/05/2019 09:57 AM    BUN/Creatinine ratio 37 (H) 09/05/2019 09:57 AM    GFR est AA 38 (L) 09/05/2019 09:57 AM    GFR est non-AA 31 (L) 09/05/2019 09:57 AM    Calcium 9.3 09/05/2019 09:57 AM         LFT  Lab Results   Component Value Date/Time    ALT (SGPT) 21 09/05/2019 09:57 AM    AST (SGOT) 17 09/05/2019 09:57 AM    Alk.  phosphatase 107 09/05/2019 09:57 AM    Bilirubin, direct 0.1 09/05/2019 09:57 AM    Bilirubin, total 0.4 09/05/2019 09:57 AM         Cholesterol  Lab Results   Component Value Date/Time    Cholesterol, total 207 (H) 09/05/2019 09:57 AM    HDL Cholesterol 92 (H) 09/05/2019 09:57 AM    LDL, calculated 97.6 09/05/2019 09:57 AM    Triglyceride 87 09/05/2019 09:57 AM    CHOL/HDL Ratio 2.3 09/05/2019 09:57 AM

## 2019-11-20 NOTE — PATIENT INSTRUCTIONS
Bradycardia: Care Instructions Your Care Instructions Bradycardia is a slow heart rate. A slow heart rate can be normal and healthy. Or it could be a sign of a problem with the heart's electrical system. If your heart beats too slowly, it may not supply your body with enough blood. This can make you weak or dizzy. Or it may make you pass out. Bradycardia can be caused by many things. This includes medicine, certain medical conditions, and changes in the heart that are the result of aging. How bradycardia is treated depends on what is causing it. Treatments include treating another health problem, changing a medicine, and getting a pacemaker. Treatment also depends on the symptoms. If bradycardia doesn't cause symptoms, it may not be treated. You and your doctor can decide what treatment is right for you. Follow-up care is a key part of your treatment and safety. Be sure to make and go to all appointments, and call your doctor if you are having problems. It's also a good idea to know your test results and keep a list of the medicines you take. How can you care for yourself at home? · Take your medicines exactly as prescribed. Call your doctor if you think you are having a problem with your medicine. If your bradycardia is caused by another disease, your doctor will try to treat the disease. If it is caused by heart medicines, he or she will adjust your medicines. · Make lifestyle changes to improve your heart health. ? Eat a heart-healthy diet that includes vegetables, fruits, nuts, beans, lean meat, fish, and whole grains. Limit alcohol, sodium, and sugar. ? Get regular exercise. Try for 30 minutes on most days of the week. If you do not have other heart problems, you likely do not have limits on the type or level of activity that you can do. You may want to walk, swim, bike, or do other activities. Ask your doctor what level of exercise is safe for you. ? Stay at a healthy weight. Lose weight if you need to. 
? Do not smoke. If you need help quitting, talk to your doctor about stop-smoking programs and medicines. These can increase your chances of quitting for good. ? Manage other health problems such as high blood pressure, high cholesterol, and diabetes. · If you get a pacemaker, you will get information about it. When should you call for help? Call 911 anytime you think you may need emergency care. For example, call if: 
  · You have symptoms of sudden heart failure. These may include: 
? Severe trouble breathing. ? A fast or irregular heartbeat. ? Coughing up pink, foamy mucus. ? You passed out.  
  · You have symptoms of a stroke. These may include: 
? Sudden numbness, tingling, weakness, or loss of movement in your face, arm, or leg, especially on only one side of your body. ? Sudden vision changes. ? Sudden trouble speaking. ? Sudden confusion or trouble understanding simple statements. ? Sudden problems with walking or balance. ? A sudden, severe headache that is different from past headaches.  
 Call your doctor now or seek immediate medical care if: 
  · You have new or changed symptoms of heart failure, such as: ? New or increased shortness of breath. ? New or worse swelling in your legs, ankles, or feet. ? Sudden weight gain, such as more than 2 to 3 pounds in a day or 5 pounds in a week. (Your doctor may suggest a different range of weight gain.) ? Feeling dizzy or lightheaded or like you may faint. ? Feeling so tired or weak that you cannot do your usual activities. ? Not sleeping well. Shortness of breath wakes you at night. You need extra pillows to prop yourself up to breathe easier.  
 Watch closely for changes in your health, and be sure to contact your doctor if: 
  · You do not get better as expected. Where can you learn more? Go to http://liliam-adrianna.info/. Enter Z655 in the search box to learn more about \"Bradycardia: Care Instructions. \" Current as of: April 9, 2019 Content Version: 12.2 © 2629-8178 [employer]. Care instructions adapted under license by [employer] (which disclaims liability or warranty for this information). If you have questions about a medical condition or this instruction, always ask your healthcare professional. [place] [address] any warranty or liability for your use of this information.

## 2019-11-20 NOTE — PROGRESS NOTES
Mona Pierre is a 69 y.o. female (: 1940) presenting to address:    Chief Complaint   Patient presents with   [facility] Follow Up       Vitals:    19 0814   BP: 193/80   Pulse: (!) 53   Resp: 16   Temp: 97.3 °F (36.3 °C)   TempSrc: Oral   Weight: 199 lb (90.3 kg)   Height: 5' 1\" (1.549 m)   PainSc:   0 - No pain       Hearing/Vision:   No exam data present    Learning Assessment:     Learning Assessment 2015   PRIMARY LEARNER Patient   HIGHEST LEVEL OF EDUCATION - PRIMARY LEARNER  DID NOT GRADUATE HIGH SCHOOL   BARRIERS PRIMARY LEARNER NONE   CO-LEARNER CAREGIVER No   PRIMARY LANGUAGE ENGLISH    NEED No   LEARNER PREFERENCE PRIMARY DEMONSTRATION   ANSWERED BY Patient   RELATIONSHIP SELF     Depression Screening:     3 most recent PHQ Screens 2019   Little interest or pleasure in doing things Not at all   Feeling down, depressed, irritable, or hopeless Not at all   Total Score PHQ 2 0     Fall Risk Assessment:     Fall Risk Assessment, last 12 mths 2019   Able to walk? Yes   Fall in past 12 months? No   Fall with injury? -   Number of falls in past 12 months -   Fall Risk Score -     Abuse Screening:     Abuse Screening Questionnaire 2018   Do you ever feel afraid of your partner? N   Are you in a relationship with someone who physically or mentally threatens you? N   Is it safe for you to go home? Y     Coordination of Care Questionaire:   1. Have you been to the ER, urgent care clinic since your last visit? Hospitalized since your last visit? Yes hospital      2. Have you seen or consulted any other health care providers outside of the [address] [place] [address] since your last visit? Include any pap smears or colon screening. NO    Advanced Directive:   1. Do you have an Advanced Directive? NO    2. Would you like information on Advanced Directives?  NO

## 2019-11-26 ENCOUNTER — PATIENT OUTREACH (OUTPATIENT)
Dept: FAMILY MEDICINE CLINIC | Age: 79
End: 2019-11-26

## 2019-11-26 NOTE — PROGRESS NOTES
11/26/2019 1:28 PM     Nurse Navigator attempted to contact patient for follow up call. Message left introducing myself, the purpose of the call and giving my contact information. Requested that patient call back at her earliest convenience.

## 2019-12-13 ENCOUNTER — PATIENT OUTREACH (OUTPATIENT)
Dept: FAMILY MEDICINE CLINIC | Age: 79
End: 2019-12-13

## 2019-12-13 NOTE — PROGRESS NOTES
Patient has graduated from the Transitions of Care Coordination  program on 12/13/2019. Patient's symptoms are stable at this time. Patient/family has the ability to self-manage. Care management goals have been completed at this time. No further care transitions nurse follow up scheduled. Goals Addressed                 This Visit's Progress     Attends follow-up appointments as directed. On track     Prevent complications post hospitalization. On track     1. CTN will monitor X 4 weeks    2. Ensure provider appt is scheduled within 7 days post-discharge    3. Confirm patient attended post-discharge provider apt  Patient attended PCP appointment on 11/20/19    Patient was wearing a cardiac event monitor until last Friday and has mailed it in. Patient has an appointment with her cardiologist with [facility] cardiology on 12/19/19 where she will get her results. 4. Complete post-visit call to confirm attendance and update care needs      5. Review/educate common or potential \"red flags\" of condition worsening    6. Evaluate adherence to medications and priority barriers to resolve      Patient needs refill on her amiodarone. CTN referred her to her cardiologist for that prescription. 7. Instruct on adherence to medications as ordered and assess for therapeutic response and side-effects         8. Discuss and evaluate ADL performance. Provide recommendations on energy conservation, particularly related to transition home from an inpatient admission. Patient has care transitions nurse contact information for any further questions, concerns, or needs.   Patient's upcoming visits:    Future Appointments   Date Time Provider Tavo Vick   1/21/2020 10:45 AM Barrett Gibson MD [facility]

## 2020-01-06 ENCOUNTER — OFFICE VISIT (OUTPATIENT)
Dept: FAMILY MEDICINE CLINIC | Age: 80
End: 2020-01-06

## 2020-01-06 VITALS
SYSTOLIC BLOOD PRESSURE: 182 MMHG | BODY MASS INDEX: 37.38 KG/M2 | RESPIRATION RATE: 18 BRPM | DIASTOLIC BLOOD PRESSURE: 80 MMHG | HEIGHT: 61 IN | HEART RATE: 56 BPM | WEIGHT: 198 LBS | OXYGEN SATURATION: 96 % | TEMPERATURE: 97.9 F

## 2020-01-06 DIAGNOSIS — I10 ESSENTIAL HYPERTENSION: Primary | ICD-10-CM

## 2020-01-06 RX ORDER — AMLODIPINE BESYLATE 5 MG/1
5 TABLET ORAL DAILY
Qty: 90 TAB | Refills: 1 | Status: SHIPPED | OUTPATIENT
Start: 2020-01-06 | End: 2020-02-19 | Stop reason: ALTCHOICE

## 2020-01-06 RX ORDER — ALBUTEROL SULFATE 90 UG/1
2 AEROSOL, METERED RESPIRATORY (INHALATION)
Qty: 2 INHALER | Refills: 2 | Status: SHIPPED | OUTPATIENT
Start: 2020-01-06 | End: 2020-07-09 | Stop reason: SDUPTHER

## 2020-01-06 RX ORDER — ERGOCALCIFEROL 1.25 MG/1
CAPSULE ORAL
Qty: 12 CAP | Refills: 2 | Status: SHIPPED | OUTPATIENT
Start: 2020-01-06 | End: 2020-08-26 | Stop reason: SDUPTHER

## 2020-01-06 RX ORDER — LOSARTAN POTASSIUM 100 MG/1
100 TABLET ORAL DAILY
Qty: 90 TAB | Refills: 1 | Status: SHIPPED | OUTPATIENT
Start: 2020-01-06 | End: 2020-07-10 | Stop reason: SDUPTHER

## 2020-01-06 NOTE — PROGRESS NOTES
Denise Nolan is a 67 y.o. female (: 1940) presenting to address:    Chief Complaint   Patient presents with    Hypertension     at home yesterday 227/100        Vitals:    20 1408   BP: 198/74   Pulse: (!) 56   Resp: 18   Temp: 97.9 °F (36.6 °C)   TempSrc: Oral   SpO2: 96%   Weight: 198 lb (89.8 kg)   Height: 5' 1\" (1.549 m)   PainSc:   0 - No pain       Hearing/Vision:   No exam data present    Learning Assessment:     Learning Assessment 2015   PRIMARY LEARNER Patient   HIGHEST LEVEL OF EDUCATION - PRIMARY LEARNER  DID NOT GRADUATE HIGH SCHOOL   BARRIERS PRIMARY LEARNER NONE   CO-LEARNER CAREGIVER No   PRIMARY LANGUAGE ENGLISH    NEED No   LEARNER PREFERENCE PRIMARY DEMONSTRATION   ANSWERED BY Patient   RELATIONSHIP SELF     Depression Screening:     3 most recent PHQ Screens 2020   Little interest or pleasure in doing things Not at all   Feeling down, depressed, irritable, or hopeless Not at all   Total Score PHQ 2 0     Fall Risk Assessment:     Fall Risk Assessment, last 12 mths 2019   Able to walk? Yes   Fall in past 12 months? No   Fall with injury? -   Number of falls in past 12 months -   Fall Risk Score -     Abuse Screening:     Abuse Screening Questionnaire 2018   Do you ever feel afraid of your partner? N   Are you in a relationship with someone who physically or mentally threatens you? N   Is it safe for you to go home? Y     Coordination of Care Questionaire:   1. Have you been to the ER, urgent care clinic since your last visit? Hospitalized since your last visit? YES    2. Have you seen or consulted any other health care providers outside of the [address] [place] [address] since your last visit? Include any pap smears or colon screening. NO    Advanced Directive:   1. Do you have an Advanced Directive? NO    2. Would you like information on Advanced Directives?  NO

## 2020-01-06 NOTE — PATIENT INSTRUCTIONS
High Blood Pressure: Care Instructions  Overview    It's normal for blood pressure to go up and down throughout the day. But if it stays up, you have high blood pressure. Another name for high blood pressure is hypertension. Despite what a lot of people think, high blood pressure usually doesn't cause headaches or make you feel dizzy or lightheaded. It usually has no symptoms. But it does increase your risk of stroke, heart attack, and other problems. You and your doctor will talk about your risks of these problems based on your blood pressure. Your doctor will give you a goal for your blood pressure. Your goal will be based on your health and your age. Lifestyle changes, such as eating healthy and being active, are always important to help lower blood pressure. You might also take medicine to reach your blood pressure goal.  Follow-up care is a key part of your treatment and safety. Be sure to make and go to all appointments, and call your doctor if you are having problems. It's also a good idea to know your test results and keep a list of the medicines you take. How can you care for yourself at home? Medical treatment  · If you stop taking your medicine, your blood pressure will go back up. You may take one or more types of medicine to lower your blood pressure. Be safe with medicines. Take your medicine exactly as prescribed. Call your doctor if you think you are having a problem with your medicine. · Talk to your doctor before you start taking aspirin every day. Aspirin can help certain people lower their risk of a heart attack or stroke. But taking aspirin isn't right for everyone, because it can cause serious bleeding. · See your doctor regularly. You may need to see the doctor more often at first or until your blood pressure comes down. · If you are taking blood pressure medicine, talk to your doctor before you take decongestants or anti-inflammatory medicine, such as ibuprofen.  Some of these medicines can raise blood pressure. · Learn how to check your blood pressure at home. Lifestyle changes  · Stay at a healthy weight. This is especially important if you put on weight around the waist. Losing even 10 pounds can help you lower your blood pressure. · If your doctor recommends it, get more exercise. Walking is a good choice. Bit by bit, increase the amount you walk every day. Try for at least 30 minutes on most days of the week. You also may want to swim, bike, or do other activities. · Avoid or limit alcohol. Talk to your doctor about whether you can drink any alcohol. · Try to limit how much sodium you eat to less than 2,300 milligrams (mg) a day. Your doctor may ask you to try to eat less than 1,500 mg a day. · Eat plenty of fruits (such as bananas and oranges), vegetables, legumes, whole grains, and low-fat dairy products. · Lower the amount of saturated fat in your diet. Saturated fat is found in animal products such as milk, cheese, and meat. Limiting these foods may help you lose weight and also lower your risk for heart disease. · Do not smoke. Smoking increases your risk for heart attack and stroke. If you need help quitting, talk to your doctor about stop-smoking programs and medicines. These can increase your chances of quitting for good. When should you call for help? Call  911 anytime you think you may need emergency care. This may mean having symptoms that suggest that your blood pressure is causing a serious heart or blood vessel problem. Your blood pressure may be over 180/120.   For example, call  911 if:    · You have symptoms of a heart attack. These may include:  ? Chest pain or pressure, or a strange feeling in the chest.  ? Sweating. ? Shortness of breath. ? Nausea or vomiting. ? Pain, pressure, or a strange feeling in the back, neck, jaw, or upper belly or in one or both shoulders or arms. ? Lightheadedness or sudden weakness.   ? A fast or irregular heartbeat.     · You have symptoms of a stroke. These may include:  ? Sudden numbness, tingling, weakness, or loss of movement in your face, arm, or leg, especially on only one side of your body. ? Sudden vision changes. ? Sudden trouble speaking. ? Sudden confusion or trouble understanding simple statements. ? Sudden problems with walking or balance. ? A sudden, severe headache that is different from past headaches.     · You have severe back or belly pain.    Do not wait until your blood pressure comes down on its own. Get help right away.   Call your doctor now or seek immediate care if:    · Your blood pressure is much higher than normal (such as 180/120 or higher), but you don't have symptoms.     · You think high blood pressure is causing symptoms, such as:  ? Severe headache.  ? Blurry vision.    Watch closely for changes in your health, and be sure to contact your doctor if:    · Your blood pressure measures higher than your doctor recommends at least 2 times. That means the top number is higher or the bottom number is higher, or both.     · You think you may be having side effects from your blood pressure medicine. Where can you learn more? Go to http://liliam-adrianna.info/. Enter C712 in the search box to learn more about \"High Blood Pressure: Care Instructions. \"  Current as of: April 9, 2019  Content Version: 12.2  © 3055-2767 [employer]. Care instructions adapted under license by [employer] (which disclaims liability or warranty for this information). If you have questions about a medical condition or this instruction, always ask your healthcare professional. [address] any warranty or liability for your use of this information.

## 2020-01-06 NOTE — PROGRESS NOTES
Assessment/Plan:    *Diagnoses and all orders for this visit:    1. Essential hypertension    Other orders  -     albuterol (PROVENTIL HFA, VENTOLIN HFA, PROAIR HFA) 90 mcg/actuation inhaler; Take 2 Puffs by inhalation every six (6) hours as needed for Wheezing.  -     losartan (COZAAR) 100 mg tablet; Take 1 Tab by mouth daily. -     ergocalciferol (VITAMIN D2) 50,000 unit capsule; take 1 capsule by mouth every 7 days  -     amLODIPine (NORVASC) 5 mg tablet; Take 1 Tab by mouth daily. She will monitor BP for the next 24-48 hrs. If BP is not coming down to acceptable range, will add Norvasc. Has appt with cards this Friday. Has next appt with me on 1/21/20. The plan was discussed with the patient. The patient verbalized understanding and is in agreement with the plan. All medication potential side effects were discussed with the patient.    -------------------------------------------------------------------------------------------------------------------        Luis Carlos Longo is a 67 y.o. female and presents with Hypertension (at home yesterday 227/100 )         Subjective:  Pt here for f/u to a recent ER visit for her BP. It has been running high. On Losartan 100 mg as this was increased by her cardiologist and she has been taking this for a month before going to the ER. She also reports that she had been off the HCTZ since Nov, as the hospital had D/jairo this whaen she was admoitted. I was not aware of that change. So fter ER visit, they started her back on HCTZ along with the Losartan. BP is coming down but still high. She says she feels better since HCTZ was added.                 Review of Systems - General ROS: negative  Respiratory ROS: no cough, shortness of breath, or wheezing  Cardiovascular ROS: no chest pain or dyspnea on exertion  Gastrointestinal ROS: no abdominal pain, change in bowel habits, or black or bloody stools  Genito-Urinary ROS: no dysuria, trouble voiding, or hematuria  Musculoskeletal ROS: negative         The problem list was updated as a part of today's visit. 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 9) with comorbidity ([address] E66.01    Colostomy present ([address] Z93.3    Acute kidney injury ([address] N17.9    Anemia of chronic disease D63.8    GERD without esophagitis K21.9    Hypoglycemia E16.2    Essential hypertension I10    Hyperkalemia E87.5       The PSH, FH were reviewed. SH:  Social History     Tobacco Use    Smoking status: Never Smoker    Smokeless tobacco: Never Used   Substance Use Topics    Alcohol use: No    Drug use: No       Medications/Allergies:  Current Outpatient Medications on File Prior to Visit   Medication Sig Dispense Refill    azelastine (OPTIVAR) 0.05 % ophthalmic solution Administer  to both eyes two (2) times a day. Use in affected eye(s)      glucose 4 gram chewable tablet Take 4 Tabs by mouth as needed (when blood glucose is low). 30 Tab 3    amiodarone (CORDARONE) 200 mg tablet Take 200 mg by mouth daily.  ondansetron (ZOFRAN ODT) 4 mg disintegrating tablet Take 4 mg by mouth every eight (8) hours as needed.  brimonidine (ALPHAGAN P) 0.1 % ophthalmic solution Administer 1 Drop to both eyes daily.  peg 400-propylene glycol (SYSTANE, PROPYLENE GLYCOL,) 0.4-0.3 % drop as needed.  apixaban (ELIQUIS) 2.5 mg tablet Take 1 Tab by mouth two (2) times a day. (Patient taking differently: Take 5 mg by mouth two (2) times a day.) 60 Tab 2    hydroCHLOROthiazide (HYDRODIURIL) 25 mg tablet Take 25 mg by mouth daily. Indications: high blood pressure, Started 03/13/2019/ restarted by Dr Aniceto Solo 3/27/2019      travoprost (TRAVATAN Z) 0.004 % ophthalmic solution Administer 1 Drop to both eyes nightly.  Blood-Glucose Meter (FREESTYLE LITE METER) monitoring kit For three times a day testing    DX: Z91.89;  E16.2, 1 Kit 0    glucose blood VI test strips (FREESTYLE LITE STRIPS) strip For three times a day testing    DX: Z91.89;  E16.2, 300 Strip 2    lancets (FREESTYLE LANCETS) 28 gauge misc For three times a day testing    DX: Z91.89;  E16.2, 300 Lancet 2    alcohol swabs (ALCOHOL PADS) padm For three times a day testing  DX: Z91.89;  E16.2, 300 Pad 2    iron bg,ps/vitC/B12/FA/calcium (NANCY FORTE PO) Take 1 Tab by mouth daily.  colesevelam (WELCHOL) 625 mg tablet Take 2 Tabs by mouth two (2) times daily (with meals). 360 Tab 3    [DISCONTINUED] losartan (COZAAR) 25 mg tablet Take 50 mg by mouth daily.  [DISCONTINUED] VITAMIN D2 50,000 unit capsule take 1 capsule by mouth every 7 days 12 Cap 1    [DISCONTINUED] albuterol (PROVENTIL HFA, VENTOLIN HFA, PROAIR HFA) 90 mcg/actuation inhaler Take 2 Puffs by inhalation every six (6) hours as needed for Wheezing. 1 Inhaler 3     No current facility-administered medications on file prior to visit.          Allergies   Allergen Reactions    Adhesive Other (comments)     Skin tears    Amoxicillin Hives         Health Maintenance:   Health Maintenance   Topic Date Due    Shingrix Vaccine Age 49> (1 of 2) 12/03/1991    Pneumococcal 65+ years (1 of 1 - PPSV23) 12/03/2006    HEMOGLOBIN A1C Q6M  03/05/2020    LIPID PANEL Q1  09/05/2020    MEDICARE YEARLY EXAM  09/11/2020    GLAUCOMA SCREENING Q2Y  06/03/2021    DTaP/Tdap/Td series (2 - Td) 08/17/2025    Bone Densitometry (Dexa) Screening  Completed    Influenza Age 9 to Adult  Completed       Objective:  Visit Vitals  /80   Pulse (!) 56   Temp 97.9 °F (36.6 °C) (Oral)   Resp 18   Ht 5' 1\" (1.549 m)   Wt 198 lb (89.8 kg)   SpO2 96%   BMI 37.41 kg/m²          Nurses notes and VS reviewed. Physical Examination: General appearance - alert, well appearing, and in no distress  Chest - clear to auscultation, no wheezes, rales or rhonchi, symmetric air entry  Heart - normal rate, regular rhythm, normal S1, S2, no murmurs, rubs, clicks or gallops  Abdomen - soft, nontender, nondistended, no masses or organomegaly          Labwork and Ancillary Studies:    CBC w/Diff  Lab Results   Component Value Date/Time    WBC 5.8 09/05/2019 09:57 AM    HGB 11.4 (L) 09/05/2019 09:57 AM    PLATELET 360 73/95/9930 09:57 AM         Basic Metabolic Profile  Lab Results   Component Value Date/Time    Sodium 138 09/05/2019 09:57 AM    Potassium 4.9 09/05/2019 09:57 AM    Chloride 106 09/05/2019 09:57 AM    CO2 29 09/05/2019 09:57 AM    Anion gap 3 09/05/2019 09:57 AM    Glucose 86 09/05/2019 09:57 AM    BUN 59 (H) 09/05/2019 09:57 AM    Creatinine 1.61 (H) 09/05/2019 09:57 AM    BUN/Creatinine ratio 37 (H) 09/05/2019 09:57 AM    GFR est AA 38 (L) 09/05/2019 09:57 AM    GFR est non-AA 31 (L) 09/05/2019 09:57 AM    Calcium 9.3 09/05/2019 09:57 AM         LFT  Lab Results   Component Value Date/Time    ALT (SGPT) 21 09/05/2019 09:57 AM    AST (SGOT) 17 09/05/2019 09:57 AM    Alk.  phosphatase 107 09/05/2019 09:57 AM    Bilirubin, direct 0.1 09/05/2019 09:57 AM    Bilirubin, total 0.4 09/05/2019 09:57 AM         Cholesterol  Lab Results   Component Value Date/Time    Cholesterol, total 207 (H) 09/05/2019 09:57 AM    HDL Cholesterol 92 (H) 09/05/2019 09:57 AM    LDL, calculated 97.6 09/05/2019 09:57 AM    Triglyceride 87 09/05/2019 09:57 AM    CHOL/HDL Ratio 2.3 09/05/2019 09:57 AM

## 2020-01-22 ENCOUNTER — OFFICE VISIT (OUTPATIENT)
Dept: FAMILY MEDICINE CLINIC | Age: 80
End: 2020-01-22

## 2020-01-22 VITALS
HEART RATE: 53 BPM | OXYGEN SATURATION: 98 % | SYSTOLIC BLOOD PRESSURE: 162 MMHG | TEMPERATURE: 97.7 F | BODY MASS INDEX: 36.44 KG/M2 | DIASTOLIC BLOOD PRESSURE: 70 MMHG | RESPIRATION RATE: 16 BRPM | WEIGHT: 193 LBS | HEIGHT: 61 IN

## 2020-01-22 DIAGNOSIS — Z91.89: ICD-10-CM

## 2020-01-22 DIAGNOSIS — I10 ESSENTIAL HYPERTENSION: Primary | ICD-10-CM

## 2020-01-22 DIAGNOSIS — E11.9 TYPE 2 DIABETES MELLITUS WITHOUT COMPLICATION, WITHOUT LONG-TERM CURRENT USE OF INSULIN (HCC): Chronic | ICD-10-CM

## 2020-01-22 DIAGNOSIS — E16.2 HYPOGLYCEMIA: ICD-10-CM

## 2020-01-22 NOTE — PROGRESS NOTES
Kar Gentile is a 67 y.o. female (: 1940) presenting to address:    Chief Complaint   Patient presents with    Hypertension       Vitals:    20 1450   BP: 162/70   Pulse: (!) 53   Resp: 16   Temp: 97.7 °F (36.5 °C)   TempSrc: Oral   SpO2: 98%   Weight: 193 lb (87.5 kg)   Height: 5' 1\" (1.549 m)   PainSc:   0 - No pain       Hearing/Vision:   No exam data present    Learning Assessment:     Learning Assessment 2015   PRIMARY LEARNER Patient   HIGHEST LEVEL OF EDUCATION - PRIMARY LEARNER  DID NOT GRADUATE HIGH SCHOOL   BARRIERS PRIMARY LEARNER NONE   CO-LEARNER CAREGIVER No   PRIMARY LANGUAGE ENGLISH    NEED No   LEARNER PREFERENCE PRIMARY DEMONSTRATION   ANSWERED BY Patient   RELATIONSHIP SELF     Depression Screening:     3 most recent PHQ Screens 2020   Little interest or pleasure in doing things Not at all   Feeling down, depressed, irritable, or hopeless Not at all   Total Score PHQ 2 0     Fall Risk Assessment:     Fall Risk Assessment, last 12 mths 2019   Able to walk? Yes   Fall in past 12 months? No   Fall with injury? -   Number of falls in past 12 months -   Fall Risk Score -     Abuse Screening:     Abuse Screening Questionnaire 2018   Do you ever feel afraid of your partner? N   Are you in a relationship with someone who physically or mentally threatens you? N   Is it safe for you to go home? Y     Coordination of Care Questionaire:   1. Have you been to the ER, urgent care clinic since your last visit? Hospitalized since your last visit? NO    2. Have you seen or consulted any other health care providers outside of the [address] [place] [address] since your last visit? Include any pap smears or colon screening. YES Cardio     Advanced Directive:   1. Do you have an Advanced Directive? NO    2. Would you like information on Advanced Directives?  NO

## 2020-01-22 NOTE — PROGRESS NOTES
Assessment/Plan:    *Diagnoses and all orders for this visit:    1. Essential hypertension    2. Hypoglycemia  -     glucose blood VI test strips (FREESTYLE LITE STRIPS) strip; For once daily testing    3. Type 2 diabetes mellitus without complication, without long-term current use of insulin (HCC)  -     glucose blood VI test strips (FREESTYLE LITE STRIPS) strip; For once daily testing    4. At risk for unstable blood glucose level  -     glucose blood VI test strips (FREESTYLE LITE STRIPS) strip; For once daily testing        Pt will increase Norvasc to 10 mg every day. Will continue to monitor BP at home. Will f/u in 4 weeks. The plan was discussed with the patient. The patient verbalized understanding and is in agreement with the plan. All medication potential side effects were discussed with the patient.    -------------------------------------------------------------------------------------------------------------------        Rufus Palumbo is a 67 y.o. female and presents with Hypertension         Subjective:  Pt here for f/u of HTN. Readings are improving with the addition of Norvasc 5 mg but still not at goal.        Review of Systems - General ROS: negative         The problem list was updated as a part of today's visit. 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 9) with comorbidity ([address] E66.01    Colostomy present ([address] Z93.3    Acute kidney injury ([address] N17.9    Anemia of chronic disease D63.8    GERD without esophagitis K21.9    Hypoglycemia E16.2    Essential hypertension I10    Hyperkalemia E87.5 The PSH,  were reviewed. SH:  Social History     Tobacco Use    Smoking status: Never Smoker    Smokeless tobacco: Never Used   Substance Use Topics    Alcohol use: No    Drug use: No       Medications/Allergies:  Current Outpatient Medications on File Prior to Visit   Medication Sig Dispense Refill    albuterol (PROVENTIL HFA, VENTOLIN HFA, PROAIR HFA) 90 mcg/actuation inhaler Take 2 Puffs by inhalation every six (6) hours as needed for Wheezing. 2 Inhaler 2    losartan (COZAAR) 100 mg tablet Take 1 Tab by mouth daily. 90 Tab 1    ergocalciferol (VITAMIN D2) 50,000 unit capsule take 1 capsule by mouth every 7 days 12 Cap 2    amLODIPine (NORVASC) 5 mg tablet Take 1 Tab by mouth daily. 90 Tab 1    glucose 4 gram chewable tablet Take 4 Tabs by mouth as needed (when blood glucose is low). 30 Tab 3    amiodarone (CORDARONE) 200 mg tablet Take 200 mg by mouth daily.  brimonidine (ALPHAGAN P) 0.1 % ophthalmic solution Administer 1 Drop to both eyes daily.  peg 400-propylene glycol (SYSTANE, PROPYLENE GLYCOL,) 0.4-0.3 % drop as needed.  apixaban (ELIQUIS) 2.5 mg tablet Take 1 Tab by mouth two (2) times a day. (Patient taking differently: Take 5 mg by mouth two (2) times a day.) 60 Tab 2    hydroCHLOROthiazide (HYDRODIURIL) 25 mg tablet Take 25 mg by mouth daily. Indications: high blood pressure, Started 03/13/2019/ restarted by Dr Viraj Cornell 3/27/2019      travoprost (TRAVATAN Z) 0.004 % ophthalmic solution Administer 1 Drop to both eyes nightly.  Blood-Glucose Meter (FREESTYLE LITE METER) monitoring kit For three times a day testing    DX: Z91.89;  E16.2, 1 Kit 0    lancets (FREESTYLE LANCETS) 28 gauge misc For three times a day testing    DX: Z91.89;  E16.2, 300 Lancet 2    alcohol swabs (ALCOHOL PADS) padm For three times a day testing  DX: Z91.89;  E16.2, 300 Pad 2    iron bg,ps/vitC/B12/FA/calcium (NANCY FORTE PO) Take 2 Caps by mouth daily.       [facility] 867 mg tablet Take 2 Tabs by mouth two (2) times daily (with meals). 360 Tab 3    azelastine (OPTIVAR) 0.05 % ophthalmic solution Administer  to both eyes two (2) times a day. Use in affected eye(s)      ondansetron (ZOFRAN ODT) 4 mg disintegrating tablet Take 4 mg by mouth every eight (8) hours as needed.  [DISCONTINUED] glucose blood VI test strips (FREESTYLE LITE STRIPS) strip For three times a day testing    DX: Z91.89;  E16.2, 300 Strip 2     No current facility-administered medications on file prior to visit. Allergies   Allergen Reactions    Adhesive Other (comments)     Skin tears    Amoxicillin Hives         Health Maintenance:   Health Maintenance   Topic Date Due    Shingrix Vaccine Age 49> (1 of 2) 12/03/1991    Pneumococcal 65+ years (1 of 1 - PPSV23) 12/03/2006    HEMOGLOBIN A1C Q6M  03/05/2020    LIPID PANEL Q1  09/05/2020    MEDICARE YEARLY EXAM  09/11/2020    GLAUCOMA SCREENING Q2Y  06/03/2021    DTaP/Tdap/Td series (2 - Td) 08/17/2025    Bone Densitometry (Dexa) Screening  Completed    Influenza Age 9 to Adult  Completed       Objective:  Visit Vitals  /70   Pulse (!) 53   Temp 97.7 °F (36.5 °C) (Oral)   Resp 16   Ht 5' 1\" (1.549 m)   Wt 193 lb (87.5 kg)   SpO2 98%   BMI 36.47 kg/m²          Nurses notes and VS reviewed.       Physical Examination: General appearance - alert, well appearing, and in no distress  Chest - clear to auscultation, no wheezes, rales or rhonchi, symmetric air entry  Heart - normal rate, regular rhythm, normal S1, S2, no murmurs, rubs, clicks or gallops  Extremities - peripheral pulses normal, no pedal edema, no clubbing or cyanosis          Labwork and Ancillary Studies:    CBC w/Diff  Lab Results   Component Value Date/Time    WBC 5.8 09/05/2019 09:57 AM    HGB 11.4 (L) 09/05/2019 09:57 AM    PLATELET 076 49/73/5532 09:57 AM         Basic Metabolic Profile  Lab Results   Component Value Date/Time    Sodium 138 09/05/2019 09:57 AM    Potassium 4.9 09/05/2019 09:57 AM    Chloride 106 09/05/2019 09:57 AM    CO2 29 09/05/2019 09:57 AM    Anion gap 3 09/05/2019 09:57 AM    Glucose 86 09/05/2019 09:57 AM    BUN 59 (H) 09/05/2019 09:57 AM    Creatinine 1.61 (H) 09/05/2019 09:57 AM    BUN/Creatinine ratio 37 (H) 09/05/2019 09:57 AM    GFR est AA 38 (L) 09/05/2019 09:57 AM    GFR est non-AA 31 (L) 09/05/2019 09:57 AM    Calcium 9.3 09/05/2019 09:57 AM         LFT  Lab Results   Component Value Date/Time    ALT (SGPT) 21 09/05/2019 09:57 AM    AST (SGOT) 17 09/05/2019 09:57 AM    Alk.  phosphatase 107 09/05/2019 09:57 AM    Bilirubin, direct 0.1 09/05/2019 09:57 AM    Bilirubin, total 0.4 09/05/2019 09:57 AM         Cholesterol  Lab Results   Component Value Date/Time    Cholesterol, total 207 (H) 09/05/2019 09:57 AM    HDL Cholesterol 92 (H) 09/05/2019 09:57 AM    LDL, calculated 97.6 09/05/2019 09:57 AM    Triglyceride 87 09/05/2019 09:57 AM    CHOL/HDL Ratio 2.3 09/05/2019 09:57 AM

## 2020-01-22 NOTE — PATIENT INSTRUCTIONS
High Blood Pressure: Care Instructions Overview It's normal for blood pressure to go up and down throughout the day. But if it stays up, you have high blood pressure. Another name for high blood pressure is hypertension. Despite what a lot of people think, high blood pressure usually doesn't cause headaches or make you feel dizzy or lightheaded. It usually has no symptoms. But it does increase your risk of stroke, heart attack, and other problems. You and your doctor will talk about your risks of these problems based on your blood pressure. Your doctor will give you a goal for your blood pressure. Your goal will be based on your health and your age. Lifestyle changes, such as eating healthy and being active, are always important to help lower blood pressure. You might also take medicine to reach your blood pressure goal. 
Follow-up care is a key part of your treatment and safety. Be sure to make and go to all appointments, and call your doctor if you are having problems. It's also a good idea to know your test results and keep a list of the medicines you take. How can you care for yourself at home? Medical treatment · If you stop taking your medicine, your blood pressure will go back up. You may take one or more types of medicine to lower your blood pressure. Be safe with medicines. Take your medicine exactly as prescribed. Call your doctor if you think you are having a problem with your medicine. · Talk to your doctor before you start taking aspirin every day. Aspirin can help certain people lower their risk of a heart attack or stroke. But taking aspirin isn't right for everyone, because it can cause serious bleeding. · See your doctor regularly. You may need to see the doctor more often at first or until your blood pressure comes down. · If you are taking blood pressure medicine, talk to your doctor before you take decongestants or anti-inflammatory medicine, such as ibuprofen. Some of these medicines can raise blood pressure. · Learn how to check your blood pressure at home. Lifestyle changes · Stay at a healthy weight. This is especially important if you put on weight around the waist. Losing even 10 pounds can help you lower your blood pressure. · If your doctor recommends it, get more exercise. Walking is a good choice. Bit by bit, increase the amount you walk every day. Try for at least 30 minutes on most days of the week. You also may want to swim, bike, or do other activities. · Avoid or limit alcohol. Talk to your doctor about whether you can drink any alcohol. · Try to limit how much sodium you eat to less than 2,300 milligrams (mg) a day. Your doctor may ask you to try to eat less than 1,500 mg a day. · Eat plenty of fruits (such as bananas and oranges), vegetables, legumes, whole grains, and low-fat dairy products. · Lower the amount of saturated fat in your diet. Saturated fat is found in animal products such as milk, cheese, and meat. Limiting these foods may help you lose weight and also lower your risk for heart disease. · Do not smoke. Smoking increases your risk for heart attack and stroke. If you need help quitting, talk to your doctor about stop-smoking programs and medicines. These can increase your chances of quitting for good. When should you call for help? Call  911 anytime you think you may need emergency care. This may mean having symptoms that suggest that your blood pressure is causing a serious heart or blood vessel problem. Your blood pressure may be over 180/120. 
 For example, call  911 if: 
  · You have symptoms of a heart attack. These may include: 
? Chest pain or pressure, or a strange feeling in the chest. 
? Sweating. ? Shortness of breath. ? Nausea or vomiting. ? Pain, pressure, or a strange feeling in the back, neck, jaw, or upper belly or in one or both shoulders or arms. ? Lightheadedness or sudden weakness. ? A fast or irregular heartbeat.  
  · You have symptoms of a stroke. These may include: 
? Sudden numbness, tingling, weakness, or loss of movement in your face, arm, or leg, especially on only one side of your body. ? Sudden vision changes. ? Sudden trouble speaking. ? Sudden confusion or trouble understanding simple statements. ? Sudden problems with walking or balance. ? A sudden, severe headache that is different from past headaches.  
  · You have severe back or belly pain.  
 Do not wait until your blood pressure comes down on its own. Get help right away. 
 Call your doctor now or seek immediate care if: 
  · Your blood pressure is much higher than normal (such as 180/120 or higher), but you don't have symptoms.  
  · You think high blood pressure is causing symptoms, such as: 
? Severe headache. 
? Blurry vision.  
 Watch closely for changes in your health, and be sure to contact your doctor if: 
  · Your blood pressure measures higher than your doctor recommends at least 2 times. That means the top number is higher or the bottom number is higher, or both.  
  · You think you may be having side effects from your blood pressure medicine. Where can you learn more? Go to http://lilima-adrianna.info/. Enter Y894 in the search box to learn more about \"High Blood Pressure: Care Instructions. \" Current as of: April 9, 2019 Content Version: 12.2 © 7004-7786 [employer]. Care instructions adapted under license by [employer] (which disclaims liability or warranty for this information). If you have questions about a medical condition or this instruction, always ask your healthcare professional. [place] [address] any warranty or liability for your use of this information.

## 2020-02-19 ENCOUNTER — TELEPHONE (OUTPATIENT)
Dept: FAMILY MEDICINE CLINIC | Age: 80
End: 2020-02-19

## 2020-02-19 ENCOUNTER — OFFICE VISIT (OUTPATIENT)
Dept: FAMILY MEDICINE CLINIC | Age: 80
End: 2020-02-19

## 2020-02-19 VITALS
SYSTOLIC BLOOD PRESSURE: 166 MMHG | RESPIRATION RATE: 16 BRPM | TEMPERATURE: 98.2 F | DIASTOLIC BLOOD PRESSURE: 64 MMHG | HEIGHT: 61 IN | BODY MASS INDEX: 36.06 KG/M2 | OXYGEN SATURATION: 99 % | HEART RATE: 52 BPM | WEIGHT: 191 LBS

## 2020-02-19 DIAGNOSIS — I10 ESSENTIAL HYPERTENSION: Primary | ICD-10-CM

## 2020-02-19 RX ORDER — MINOXIDIL 2.5 MG/1
5 TABLET ORAL DAILY
Qty: 60 TAB | Refills: 1 | Status: SHIPPED | OUTPATIENT
Start: 2020-02-19 | End: 2020-08-26 | Stop reason: SDUPTHER

## 2020-02-19 NOTE — PROGRESS NOTES
Assessment/Plan:    *Diagnoses and all orders for this visit:    1. Essential hypertension    Other orders  -     minoxidil (LONITEN) 2.5 mg tablet; Take 2 Tabs by mouth daily. Will try adding Minoxidil. F/u in 3 weeks. Will start with 1 tab every day x 5 days then increase to 2 tabs every day. If she has ay side effects, was told to call our office sooner. I spent 15 minutes with patient today and > 50% of the visit was dedicated to discussing and counseling the patient on medication choices, side effects. The plan was discussed with the patient. The patient verbalized understanding and is in agreement with the plan. All medication potential side effects were discussed with the patient.    -------------------------------------------------------------------------------------------------------------------        Svetlnaa Melendez is a 67 y.o. female and presents with Hypertension (stopped norvasc due to severe headache )         Subjective:  HTN:  Not able to tolerate Norvasc. She took it just a few days and developed a headache. She stopped it but never called us to notify us of this reaction. Review of Systems - General ROS: negative         The problem list was updated as a part of today's visit. 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 9) with comorbidity ([address] E66.01    Colostomy present ([address] Z93.3    Acute kidney injury ([address] N17.9    Anemia of chronic disease D63.8    GERD without esophagitis K21.9    Hypoglycemia E16.2    Essential hypertension I10    Hyperkalemia E87.5       The PS, FH were reviewed. SH:  Social History     Tobacco Use    Smoking status: Never Smoker    Smokeless tobacco: Never Used   Substance Use Topics    Alcohol use: No    Drug use: No       Medications/Allergies:  Current Outpatient Medications on File Prior to Visit   Medication Sig Dispense Refill    glucose blood VI test strips (FREESTYLE LITE STRIPS) strip For once daily testing 100 Strip 2    albuterol (PROVENTIL HFA, VENTOLIN HFA, PROAIR HFA) 90 mcg/actuation inhaler Take 2 Puffs by inhalation every six (6) hours as needed for Wheezing. 2 Inhaler 2    losartan (COZAAR) 100 mg tablet Take 1 Tab by mouth daily. 90 Tab 1    ergocalciferol (VITAMIN D2) 50,000 unit capsule take 1 capsule by mouth every 7 days 12 Cap 2    glucose 4 gram chewable tablet Take 4 Tabs by mouth as needed (when blood glucose is low). 30 Tab 3    amiodarone (CORDARONE) 200 mg tablet Take 200 mg by mouth daily.  ondansetron (ZOFRAN ODT) 4 mg disintegrating tablet Take 4 mg by mouth every eight (8) hours as needed.  brimonidine (ALPHAGAN P) 0.1 % ophthalmic solution Administer 1 Drop to both eyes daily.  apixaban (ELIQUIS) 2.5 mg tablet Take 1 Tab by mouth two (2) times a day. (Patient taking differently: Take 5 mg by mouth two (2) times a day.) 60 Tab 2    hydroCHLOROthiazide (HYDRODIURIL) 25 mg tablet Take 25 mg by mouth daily. Indications: high blood pressure, Started 03/13/2019/ restarted by Dr Kevan Burrows 3/27/2019      travoprost (TRAVATAN Z) 0.004 % ophthalmic solution Administer 1 Drop to both eyes nightly.       Blood-Glucose Meter (FREESTYLE LITE METER) monitoring kit For three times a day testing    DX: Z91.89;  E16.2, 1 Kit 0    lancets (FREESTYLE LANCETS) 28 gauge misc For three times a day testing    DX: Z91.89;  E16.2, 300 Lancet 2    alcohol swabs (ALCOHOL PADS) padm For three times a day testing  DX: Z91.89;  E16.2, 300 Pad 2    iron bg,ps/vitC/B12/FA/calcium (NANCY FORTE PO) Take 2 Caps by mouth daily.      [facility]) 625 mg tablet Take 2 Tabs by mouth two (2) times daily (with meals). 360 Tab 3    [DISCONTINUED] amLODIPine (NORVASC) 5 mg tablet Take 1 Tab by mouth daily. 90 Tab 1    [DISCONTINUED] azelastine (OPTIVAR) 0.05 % ophthalmic solution Administer  to both eyes two (2) times a day. Use in affected eye(s)      [DISCONTINUED] peg 400-propylene glycol (SYSTANE, PROPYLENE GLYCOL,) 0.4-0.3 % drop as needed. No current facility-administered medications on file prior to visit. Allergies   Allergen Reactions    Adhesive Other (comments)     Skin tears    Amlodipine Other (comments)     Severe Headache     Amoxicillin Hives    Azelastine Itching     Eye Redness     Coricidin Hbp [Dextromethorphan-Guaifenesin] Other (comments)     Headache          Health Maintenance:   Health Maintenance   Topic Date Due    Shingrix Vaccine Age 49> (1 of 2) 12/03/1991    Pneumococcal 65+ years (1 of 1 - PPSV23) 12/03/2006    Medicare Yearly Exam  09/11/2020    GLAUCOMA SCREENING Q2Y  06/03/2021    DTaP/Tdap/Td series (2 - Td) 08/17/2025    Bone Densitometry (Dexa) Screening  Completed    Influenza Age 9 to Adult  Completed       Objective:  Visit Vitals  /64   Pulse (!) 52   Temp 98.2 °F (36.8 °C) (Oral)   Resp 16   Ht 5' 1\" (1.549 m)   Wt 191 lb (86.6 kg)   SpO2 99%   BMI 36.09 kg/m²          Nurses notes and VS reviewed.       Physical Examination: General appearance - alert, well appearing, and in no distress        Labwork and Ancillary Studies:    CBC w/Diff  Lab Results   Component Value Date/Time    WBC 5.8 09/05/2019 09:57 AM    HGB 11.4 (L) 09/05/2019 09:57 AM    PLATELET 279 38/50/3737 09:57 AM         Basic Metabolic Profile  Lab Results   Component Value Date/Time    Sodium 138 09/05/2019 09:57 AM    Potassium 4.9 09/05/2019 09:57 AM    Chloride 106 09/05/2019 09:57 AM    CO2 29 09/05/2019 09:57 AM    Anion gap 3 09/05/2019 09:57 AM    Glucose 86 09/05/2019 09:57 AM BUN 59 (H) 09/05/2019 09:57 AM    Creatinine 1.61 (H) 09/05/2019 09:57 AM    BUN/Creatinine ratio 37 (H) 09/05/2019 09:57 AM    GFR est AA 38 (L) 09/05/2019 09:57 AM    GFR est non-AA 31 (L) 09/05/2019 09:57 AM    Calcium 9.3 09/05/2019 09:57 AM         LFT  Lab Results   Component Value Date/Time    ALT (SGPT) 21 09/05/2019 09:57 AM    AST (SGOT) 17 09/05/2019 09:57 AM    Alk.  phosphatase 107 09/05/2019 09:57 AM    Bilirubin, direct 0.1 09/05/2019 09:57 AM    Bilirubin, total 0.4 09/05/2019 09:57 AM         Cholesterol  Lab Results   Component Value Date/Time    Cholesterol, total 207 (H) 09/05/2019 09:57 AM    HDL Cholesterol 92 (H) 09/05/2019 09:57 AM    LDL, calculated 97.6 09/05/2019 09:57 AM    Triglyceride 87 09/05/2019 09:57 AM    CHOL/HDL Ratio 2.3 09/05/2019 09:57 AM

## 2020-02-19 NOTE — PATIENT INSTRUCTIONS
High Blood Pressure: Care Instructions  Overview    It's normal for blood pressure to go up and down throughout the day. But if it stays up, you have high blood pressure. Another name for high blood pressure is hypertension. Despite what a lot of people think, high blood pressure usually doesn't cause headaches or make you feel dizzy or lightheaded. It usually has no symptoms. But it does increase your risk of stroke, heart attack, and other problems. You and your doctor will talk about your risks of these problems based on your blood pressure. Your doctor will give you a goal for your blood pressure. Your goal will be based on your health and your age. Lifestyle changes, such as eating healthy and being active, are always important to help lower blood pressure. You might also take medicine to reach your blood pressure goal.  Follow-up care is a key part of your treatment and safety. Be sure to make and go to all appointments, and call your doctor if you are having problems. It's also a good idea to know your test results and keep a list of the medicines you take. How can you care for yourself at home? Medical treatment  · If you stop taking your medicine, your blood pressure will go back up. You may take one or more types of medicine to lower your blood pressure. Be safe with medicines. Take your medicine exactly as prescribed. Call your doctor if you think you are having a problem with your medicine. · Talk to your doctor before you start taking aspirin every day. Aspirin can help certain people lower their risk of a heart attack or stroke. But taking aspirin isn't right for everyone, because it can cause serious bleeding. · See your doctor regularly. You may need to see the doctor more often at first or until your blood pressure comes down. · If you are taking blood pressure medicine, talk to your doctor before you take decongestants or anti-inflammatory medicine, such as ibuprofen.  Some of these medicines can raise blood pressure. · Learn how to check your blood pressure at home. Lifestyle changes  · Stay at a healthy weight. This is especially important if you put on weight around the waist. Losing even 10 pounds can help you lower your blood pressure. · If your doctor recommends it, get more exercise. Walking is a good choice. Bit by bit, increase the amount you walk every day. Try for at least 30 minutes on most days of the week. You also may want to swim, bike, or do other activities. · Avoid or limit alcohol. Talk to your doctor about whether you can drink any alcohol. · Try to limit how much sodium you eat to less than 2,300 milligrams (mg) a day. Your doctor may ask you to try to eat less than 1,500 mg a day. · Eat plenty of fruits (such as bananas and oranges), vegetables, legumes, whole grains, and low-fat dairy products. · Lower the amount of saturated fat in your diet. Saturated fat is found in animal products such as milk, cheese, and meat. Limiting these foods may help you lose weight and also lower your risk for heart disease. · Do not smoke. Smoking increases your risk for heart attack and stroke. If you need help quitting, talk to your doctor about stop-smoking programs and medicines. These can increase your chances of quitting for good. When should you call for help? Call  911 anytime you think you may need emergency care. This may mean having symptoms that suggest that your blood pressure is causing a serious heart or blood vessel problem. Your blood pressure may be over 180/120.   For example, call  911 if:    · You have symptoms of a heart attack. These may include:  ? Chest pain or pressure, or a strange feeling in the chest.  ? Sweating. ? Shortness of breath. ? Nausea or vomiting. ? Pain, pressure, or a strange feeling in the back, neck, jaw, or upper belly or in one or both shoulders or arms. ? Lightheadedness or sudden weakness.   ? A fast or irregular heartbeat.     · You have symptoms of a stroke. These may include:  ? Sudden numbness, tingling, weakness, or loss of movement in your face, arm, or leg, especially on only one side of your body. ? Sudden vision changes. ? Sudden trouble speaking. ? Sudden confusion or trouble understanding simple statements. ? Sudden problems with walking or balance. ? A sudden, severe headache that is different from past headaches.     · You have severe back or belly pain.    Do not wait until your blood pressure comes down on its own. Get help right away.   Call your doctor now or seek immediate care if:    · Your blood pressure is much higher than normal (such as 180/120 or higher), but you don't have symptoms.     · You think high blood pressure is causing symptoms, such as:  ? Severe headache.  ? Blurry vision.    Watch closely for changes in your health, and be sure to contact your doctor if:    · Your blood pressure measures higher than your doctor recommends at least 2 times. That means the top number is higher or the bottom number is higher, or both.     · You think you may be having side effects from your blood pressure medicine. Where can you learn more? Go to http://liliam-adrianna.info/. Enter T820 in the search box to learn more about \"High Blood Pressure: Care Instructions. \"  Current as of: April 9, 2019  Content Version: 12.2  © 8007-2028 [employer]. Care instructions adapted under license by [employer] (which disclaims liability or warranty for this information). If you have questions about a medical condition or this instruction, always ask your healthcare professional. [address] any warranty or liability for your use of this information.

## 2020-02-19 NOTE — TELEPHONE ENCOUNTER
Called [facility], and spoke with Arian Conner. Asked why the patient has not received her ostomy supplies, when the order was faxed on 01/30/2020, and we did receive a fax confirmation. Arian Conner stated that she does not know why the patient has not received her supplies, but it is set to be sent out today. Asked her how long does shipping take? And she responded with two day, explained to her that the patient needs these supplies tomorrow, and can not wait until Friday. She stated that she will set it to one day air, and the patient should receive her supplies tomorrow. The patient was here in the office, and I told her to call me if she does not receive her supplies tomorrow, and she stated that she will.

## 2020-02-19 NOTE — PROGRESS NOTES
Octaviano Gonzales is a 67 y.o. female (: 1940) presenting to address:    Chief Complaint   Patient presents with    Hypertension     stopped norvasc due to severe headache        Vitals:    20 1319   BP: 193/66   Pulse: (!) 52   Resp: 16   Temp: 98.2 °F (36.8 °C)   TempSrc: Oral   SpO2: 99%   Weight: 191 lb (86.6 kg)   Height: 5' 1\" (1.549 m)   PainSc:   0 - No pain       Hearing/Vision:   No exam data present    Learning Assessment:     Learning Assessment 2015   PRIMARY LEARNER Patient   HIGHEST LEVEL OF EDUCATION - PRIMARY LEARNER  DID NOT GRADUATE HIGH SCHOOL   BARRIERS PRIMARY LEARNER NONE   CO-LEARNER CAREGIVER No   PRIMARY LANGUAGE ENGLISH    NEED No   LEARNER PREFERENCE PRIMARY DEMONSTRATION   ANSWERED BY Patient   RELATIONSHIP SELF     Depression Screening:     3 most recent PHQ Screens 2020   Little interest or pleasure in doing things Not at all   Feeling down, depressed, irritable, or hopeless Not at all   Total Score PHQ 2 0     Fall Risk Assessment:     Fall Risk Assessment, last 12 mths 2019   Able to walk? Yes   Fall in past 12 months? No   Fall with injury? -   Number of falls in past 12 months -   Fall Risk Score -     Abuse Screening:     Abuse Screening Questionnaire 2018   Do you ever feel afraid of your partner? N   Are you in a relationship with someone who physically or mentally threatens you? N   Is it safe for you to go home? Y     Coordination of Care Questionaire:   1. Have you been to the ER, urgent care clinic since your last visit? Hospitalized since your last visit? NO    2. Have you seen or consulted any other health care providers outside of the [address] [place] [address] since your last visit? Include any pap smears or colon screening. NO    Advanced Directive:   1. Do you have an Advanced Directive? NO    2. Would you like information on Advanced Directives?  NO

## 2020-03-18 ENCOUNTER — OFFICE VISIT (OUTPATIENT)
Dept: FAMILY MEDICINE CLINIC | Age: 80
End: 2020-03-18

## 2020-03-18 VITALS
HEART RATE: 52 BPM | TEMPERATURE: 98.4 F | RESPIRATION RATE: 16 BRPM | BODY MASS INDEX: 36.25 KG/M2 | WEIGHT: 192 LBS | DIASTOLIC BLOOD PRESSURE: 80 MMHG | OXYGEN SATURATION: 100 % | HEIGHT: 61 IN | SYSTOLIC BLOOD PRESSURE: 142 MMHG

## 2020-03-18 DIAGNOSIS — I10 ESSENTIAL HYPERTENSION: Primary | ICD-10-CM

## 2020-03-18 DIAGNOSIS — J06.9 UPPER RESPIRATORY TRACT INFECTION, UNSPECIFIED TYPE: ICD-10-CM

## 2020-03-18 RX ORDER — DOXYCYCLINE 100 MG/1
100 TABLET ORAL 2 TIMES DAILY
Qty: 14 TAB | Refills: 0 | Status: SHIPPED | OUTPATIENT
Start: 2020-03-18 | End: 2020-03-25

## 2020-03-18 NOTE — PROGRESS NOTES
Assessment/Plan:    *Diagnoses and all orders for this visit:    1. Essential hypertension    2. Upper respiratory tract infection, unspecified type  -     doxycycline (ADOXA) 100 mg tablet; Take 1 Tab by mouth two (2) times a day for 7 days. Will call if not better. Physical in 6 months. The plan was discussed with the patient. The patient verbalized understanding and is in agreement with the plan. All medication potential side effects were discussed with the patient.    -------------------------------------------------------------------------------------------------------------------        Kirti Hernandez is a 67 y.o. female and presents with Cough (coughing up yellow mucus started sunday . ) and Hypertension         Subjective:  Pt here for HTN follow up. The Minoxidil is working wonderfully. She is tolerating things well. BP is well controlled today. Having 4 days of chest congestion, yellow phlegm, no fevers. No recent travel and no other sick contacts. 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 Review of Systems - General ROS: negative         The problem list was updated as a part of today's visit. 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 9) with comorbidity ([address] E66.01    Colostomy present ([address] Z93.3    Acute kidney injury ([facility] [address] N17.9    Anemia of chronic disease D63.8    GERD without esophagitis K21.9    Hypoglycemia E16.2    Essential hypertension I10    Hyperkalemia E87.5       The PSH, FH were reviewed.         SH:  Social History     Tobacco Use    Smoking status: Never Smoker    Smokeless tobacco: Never Used   Substance Use Topics    Alcohol use: No    Drug use: No       Medications/Allergies:  Current Outpatient Medications on File Prior to Visit   Medication Sig Dispense Refill    minoxidil (LONITEN) 2.5 mg tablet Take 2 Tabs by mouth daily. 60 Tab 1    albuterol (PROVENTIL HFA, VENTOLIN HFA, PROAIR HFA) 90 mcg/actuation inhaler Take 2 Puffs by inhalation every six (6) hours as needed for Wheezing. 2 Inhaler 2    losartan (COZAAR) 100 mg tablet Take 1 Tab by mouth daily. 90 Tab 1    ergocalciferol (VITAMIN D2) 50,000 unit capsule take 1 capsule by mouth every 7 days 12 Cap 2    amiodarone (CORDARONE) 200 mg tablet Take 200 mg by mouth daily.  brimonidine (ALPHAGAN P) 0.1 % ophthalmic solution Administer 1 Drop to both eyes daily.  apixaban (ELIQUIS) 2.5 mg tablet Take 1 Tab by mouth two (2) times a day. (Patient taking differently: Take 5 mg by mouth two (2) times a day.) 60 Tab 2    hydroCHLOROthiazide (HYDRODIURIL) 25 mg tablet Take 25 mg by mouth daily. Indications: high blood pressure, Started 03/13/2019/ restarted by Dr Oneida Saenz 3/27/2019      travoprost (TRAVATAN Z) 0.004 % ophthalmic solution Administer 1 Drop to both eyes nightly.  Blood-Glucose Meter (FREESTYLE LITE METER) monitoring kit For three times a day testing    DX: Z91.89;  E16.2, 1 Kit 0    alcohol swabs (ALCOHOL PADS) padm For three times a day testing  DX: Z91.89;  E16.2, 300 Pad 2    iron bg,ps/vitC/B12/FA/calcium (NANCY FORTE PO) Take 2 Caps by mouth daily.  colesevelam (WELCHOL) 625 mg tablet Take 2 Tabs by mouth two (2) times daily (with meals). 360 Tab 3    [DISCONTINUED] glucose blood VI test strips (FREESTYLE LITE STRIPS) strip For once daily testing 100 Strip 2    [DISCONTINUED] glucose 4 gram chewable tablet Take 4 Tabs by mouth as needed (when blood glucose is low).  30 Tab 3    [DISCONTINUED] ondansetron (ZOFRAN ODT) 4 mg disintegrating tablet Take 4 mg by mouth every eight (8) hours as needed.  [DISCONTINUED] lancets (FREESTYLE LANCETS) 28 gauge misc For three times a day testing    DX: Z91.89;  E16.2, 300 Lancet 2     No current facility-administered medications on file prior to visit. Allergies   Allergen Reactions    Adhesive Other (comments)     Skin tears    Amlodipine Other (comments)     Severe Headache     Amoxicillin Hives    Azelastine Itching     Eye Redness     Coricidin Hbp [Dextromethorphan-Guaifenesin] Other (comments)     Headache          Health Maintenance:   Health Maintenance   Topic Date Due    Shingrix Vaccine Age 49> (1 of 2) 12/03/1991    Pneumococcal 65+ years (1 of 1 - PPSV23) 12/03/2006    Medicare Yearly Exam  09/11/2020    GLAUCOMA SCREENING Q2Y  06/03/2021    DTaP/Tdap/Td series (2 - Td) 08/17/2025    Bone Densitometry (Dexa) Screening  Completed    Influenza Age 9 to Adult  Completed       Objective:  Visit Vitals  /80   Pulse (!) 52   Temp 98.4 °F (36.9 °C) (Oral)   Resp 16   Ht 5' 1\" (1.549 m)   Wt 192 lb (87.1 kg)   SpO2 100%   BMI 36.28 kg/m²          Nurses notes and VS reviewed.       Physical Examination: General appearance - alert, well appearing, and in no distress  Chest - clear to auscultation, no wheezes, rales or rhonchi, symmetric air entry  Heart - normal rate, regular rhythm, normal S1, S2, no murmurs, rubs, clicks or gallops          Labwork and Ancillary Studies:    CBC w/Diff  Lab Results   Component Value Date/Time    WBC 5.8 09/05/2019 09:57 AM    HGB 11.4 (L) 09/05/2019 09:57 AM    PLATELET 132 87/36/6517 09:57 AM         Basic Metabolic Profile  Lab Results   Component Value Date/Time    Sodium 138 09/05/2019 09:57 AM    Potassium 4.9 09/05/2019 09:57 AM    Chloride 106 09/05/2019 09:57 AM    CO2 29 09/05/2019 09:57 AM    Anion gap 3 09/05/2019 09:57 AM    Glucose 86 09/05/2019 09:57 AM    BUN 59 (H) 09/05/2019 09:57 AM    Creatinine 1.61 (H) 09/05/2019 09:57 AM    BUN/Creatinine ratio 37 (H) 09/05/2019 09:57 AM    GFR est AA 38 (L) 09/05/2019 09:57 AM    GFR est non-AA 31 (L) 09/05/2019 09:57 AM    Calcium 9.3 09/05/2019 09:57 AM         LFT  Lab Results   Component Value Date/Time    ALT (SGPT) 21 09/05/2019 09:57 AM    AST (SGOT) 17 09/05/2019 09:57 AM    Alk.  phosphatase 107 09/05/2019 09:57 AM    Bilirubin, direct 0.1 09/05/2019 09:57 AM    Bilirubin, total 0.4 09/05/2019 09:57 AM         Cholesterol  Lab Results   Component Value Date/Time    Cholesterol, total 207 (H) 09/05/2019 09:57 AM    HDL Cholesterol 92 (H) 09/05/2019 09:57 AM    LDL, calculated 97.6 09/05/2019 09:57 AM    Triglyceride 87 09/05/2019 09:57 AM    CHOL/HDL Ratio 2.3 09/05/2019 09:57 AM

## 2020-03-18 NOTE — PATIENT INSTRUCTIONS
High Blood Pressure: Care Instructions Overview It's normal for blood pressure to go up and down throughout the day. But if it stays up, you have high blood pressure. Another name for high blood pressure is hypertension. Despite what a lot of people think, high blood pressure usually doesn't cause headaches or make you feel dizzy or lightheaded. It usually has no symptoms. But it does increase your risk of stroke, heart attack, and other problems. You and your doctor will talk about your risks of these problems based on your blood pressure. Your doctor will give you a goal for your blood pressure. Your goal will be based on your health and your age. Lifestyle changes, such as eating healthy and being active, are always important to help lower blood pressure. You might also take medicine to reach your blood pressure goal. 
Follow-up care is a key part of your treatment and safety. Be sure to make and go to all appointments, and call your doctor if you are having problems. It's also a good idea to know your test results and keep a list of the medicines you take. How can you care for yourself at home? Medical treatment · If you stop taking your medicine, your blood pressure will go back up. You may take one or more types of medicine to lower your blood pressure. Be safe with medicines. Take your medicine exactly as prescribed. Call your doctor if you think you are having a problem with your medicine. · Talk to your doctor before you start taking aspirin every day. Aspirin can help certain people lower their risk of a heart attack or stroke. But taking aspirin isn't right for everyone, because it can cause serious bleeding. · See your doctor regularly. You may need to see the doctor more often at first or until your blood pressure comes down. · If you are taking blood pressure medicine, talk to your doctor before you take decongestants or anti-inflammatory medicine, such as ibuprofen. Some of these medicines can raise blood pressure. · Learn how to check your blood pressure at home. Lifestyle changes · Stay at a healthy weight. This is especially important if you put on weight around the waist. Losing even 10 pounds can help you lower your blood pressure. · If your doctor recommends it, get more exercise. Walking is a good choice. Bit by bit, increase the amount you walk every day. Try for at least 30 minutes on most days of the week. You also may want to swim, bike, or do other activities. · Avoid or limit alcohol. Talk to your doctor about whether you can drink any alcohol. · Try to limit how much sodium you eat to less than 2,300 milligrams (mg) a day. Your doctor may ask you to try to eat less than 1,500 mg a day. · Eat plenty of fruits (such as bananas and oranges), vegetables, legumes, whole grains, and low-fat dairy products. · Lower the amount of saturated fat in your diet. Saturated fat is found in animal products such as milk, cheese, and meat. Limiting these foods may help you lose weight and also lower your risk for heart disease. · Do not smoke. Smoking increases your risk for heart attack and stroke. If you need help quitting, talk to your doctor about stop-smoking programs and medicines. These can increase your chances of quitting for good. When should you call for help? Call  911 anytime you think you may need emergency care. This may mean having symptoms that suggest that your blood pressure is causing a serious heart or blood vessel problem. Your blood pressure may be over 180/120. 
 For example, call  911 if: 
  · You have symptoms of a heart attack. These may include: 
? Chest pain or pressure, or a strange feeling in the chest. 
? Sweating. ? Shortness of breath. ? Nausea or vomiting. ? Pain, pressure, or a strange feeling in the back, neck, jaw, or upper belly or in one or both shoulders or arms. ? Lightheadedness or sudden weakness. ? A fast or irregular heartbeat.  
  · You have symptoms of a stroke. These may include: 
? Sudden numbness, tingling, weakness, or loss of movement in your face, arm, or leg, especially on only one side of your body. ? Sudden vision changes. ? Sudden trouble speaking. ? Sudden confusion or trouble understanding simple statements. ? Sudden problems with walking or balance. ? A sudden, severe headache that is different from past headaches.  
  · You have severe back or belly pain.  
 Do not wait until your blood pressure comes down on its own. Get help right away. 
 Call your doctor now or seek immediate care if: 
  · Your blood pressure is much higher than normal (such as 180/120 or higher), but you don't have symptoms.  
  · You think high blood pressure is causing symptoms, such as: 
? Severe headache. 
? Blurry vision.  
 Watch closely for changes in your health, and be sure to contact your doctor if: 
  · Your blood pressure measures higher than your doctor recommends at least 2 times. That means the top number is higher or the bottom number is higher, or both.  
  · You think you may be having side effects from your blood pressure medicine. Where can you learn more? Go to http://liliam-adrianna.info/ Enter L787 in the search box to learn more about \"High Blood Pressure: Care Instructions. \" Current as of: December 15, 2019Content Version: 12.4 © 3352-6832 [employer]. Care instructions adapted under license by [employer] (which disclaims liability or warranty for this information). If you have questions about a medical condition or this instruction, always ask your healthcare professional. [place] [address] any warranty or liability for your use of this information.

## 2020-03-18 NOTE — PROGRESS NOTES
Katie Coe is a 67 y.o. female (: 1940) presenting to address:    Chief Complaint   Patient presents with    Cough     coughing up yellow mucus started  .  Hypertension       Vitals:    20 1116   BP: 142/80   Pulse: (!) 52   Resp: 16   Temp: 98.4 °F (36.9 °C)   TempSrc: Oral   SpO2: 100%   Weight: 192 lb (87.1 kg)   Height: 5' 1\" (1.549 m)   PainSc:   0 - No pain       Hearing/Vision:   No exam data present    Learning Assessment:     Learning Assessment 2015   PRIMARY LEARNER Patient   HIGHEST LEVEL OF EDUCATION - PRIMARY LEARNER  DID NOT GRADUATE HIGH SCHOOL   BARRIERS PRIMARY LEARNER NONE   CO-LEARNER CAREGIVER No   PRIMARY LANGUAGE ENGLISH    NEED No   LEARNER PREFERENCE PRIMARY DEMONSTRATION   ANSWERED BY Patient   RELATIONSHIP SELF     Depression Screening:     3 most recent PHQ Screens 2020   Little interest or pleasure in doing things Not at all   Feeling down, depressed, irritable, or hopeless Not at all   Total Score PHQ 2 0     Fall Risk Assessment:     Fall Risk Assessment, last 12 mths 2019   Able to walk? Yes   Fall in past 12 months? No   Fall with injury? -   Number of falls in past 12 months -   Fall Risk Score -     Abuse Screening:     Abuse Screening Questionnaire 2018   Do you ever feel afraid of your partner? N   Are you in a relationship with someone who physically or mentally threatens you? N   Is it safe for you to go home? Y     Coordination of Care Questionaire:   1. Have you been to the ER, urgent care clinic since your last visit? Hospitalized since your last visit? NO    2. Have you seen or consulted any other health care providers outside of the [address] [place] [address] since your last visit? Include any pap smears or colon screening. NO    Advanced Directive:   1. Do you have an Advanced Directive? NO    2. Would you like information on Advanced Directives?  NO

## 2020-07-09 ENCOUNTER — OFFICE VISIT (OUTPATIENT)
Dept: FAMILY MEDICINE CLINIC | Age: 80
End: 2020-07-09

## 2020-07-09 VITALS
SYSTOLIC BLOOD PRESSURE: 170 MMHG | DIASTOLIC BLOOD PRESSURE: 86 MMHG | HEART RATE: 51 BPM | RESPIRATION RATE: 22 BRPM | TEMPERATURE: 97.6 F | WEIGHT: 205 LBS | HEIGHT: 61 IN | BODY MASS INDEX: 38.71 KG/M2 | OXYGEN SATURATION: 100 %

## 2020-07-09 DIAGNOSIS — L56.8 PHOTODERMATITIS OR PHOTOSENSITIVITY: ICD-10-CM

## 2020-07-09 DIAGNOSIS — R25.1 TREMOR OF BOTH HANDS: ICD-10-CM

## 2020-07-09 DIAGNOSIS — I10 ESSENTIAL HYPERTENSION: Primary | ICD-10-CM

## 2020-07-09 RX ORDER — ALBUTEROL SULFATE 90 UG/1
2 AEROSOL, METERED RESPIRATORY (INHALATION)
Qty: 2 INHALER | Refills: 2 | Status: SHIPPED | OUTPATIENT
Start: 2020-07-09

## 2020-07-09 RX ORDER — IRON BG,PS/VITC/B12/FA/CALCIUM 150MG-60-1
2 CAPSULE ORAL DAILY
Qty: 180 CAP | Refills: 1 | Status: SHIPPED | OUTPATIENT
Start: 2020-07-09

## 2020-07-09 NOTE — PROGRESS NOTES
Kristine Lozada is a 67 y.o. female (: 1940) presenting to address:    Chief Complaint   Patient presents with    Rash     neck ,around back and both arms started friday     Bleeding/Bruising     left leg        Vitals:    20 0800   BP: (!) 211/80   Pulse: (!) 51   Resp: 22   Temp: 97.6 °F (36.4 °C)   TempSrc: Oral   SpO2: 100%   Weight: 205 lb (93 kg)   Height: 5' 1\" (1.549 m)   PainSc:   0 - No pain       Hearing/Vision:   No exam data present    Learning Assessment:     Learning Assessment 2015   PRIMARY LEARNER Patient   HIGHEST LEVEL OF EDUCATION - PRIMARY LEARNER  DID NOT GRADUATE HIGH SCHOOL   BARRIERS PRIMARY LEARNER NONE   CO-LEARNER CAREGIVER No   PRIMARY LANGUAGE ENGLISH    NEED No   LEARNER PREFERENCE PRIMARY DEMONSTRATION   ANSWERED BY Patient   RELATIONSHIP SELF     Depression Screening:     3 most recent PHQ Screens 2020   Little interest or pleasure in doing things Not at all   Feeling down, depressed, irritable, or hopeless Not at all   Total Score PHQ 2 0     Fall Risk Assessment:     Fall Risk Assessment, last 12 mths 2019   Able to walk? Yes   Fall in past 12 months? No   Fall with injury? -   Number of falls in past 12 months -   Fall Risk Score -     Abuse Screening:     Abuse Screening Questionnaire 2018   Do you ever feel afraid of your partner? N   Are you in a relationship with someone who physically or mentally threatens you? N   Is it safe for you to go home? Y     Coordination of Care Questionaire:   1. Have you been to the ER, urgent care clinic since your last visit? Hospitalized since your last visit? NO    2. Have you seen or consulted any other health care providers outside of the [address] [place] [address] since your last visit? Include any pap smears or colon screening. NO    Advanced Directive:   1. Do you have an Advanced Directive? NO    2. Would you like information on Advanced Directives?  NO

## 2020-07-09 NOTE — PROGRESS NOTES
Assessment/Plan:    *Diagnoses and all orders for this visit:    1. Essential hypertension    2. Tremor of both hands    3. Photodermatitis or photosensitivity    Other orders  -     Iron BisGl &PS [id number] (Mendel Jean) [id number] mg-mg-mcg-mg cap; Take 2 Caps by mouth daily. -     albuterol (PROVENTIL HFA, VENTOLIN HFA, PROAIR HFA) 90 mcg/actuation inhaler; Take 2 Puffs by inhalation every six (6) hours as needed for Wheezing. Will start using a moisturizer with sunscreen. Will monitor the tremors. Will try Minoxidil again. Will f/u in 3 weeks for HTN. Will also see how rash is doing. The plan was discussed with the patient. The patient verbalized understanding and is in agreement with the plan. All medication potential side effects were discussed with the patient.    -------------------------------------------------------------------------------------------------------------------        Sher Sawyer is a 67 y.o. female and presents with Rash (neck ,around back and both arms started friday ) and Bleeding/Bruising (left leg )         Subjective:  Pt here for a few issues but her BP is very uncontrolled. HTN:  We Rxd minoxidil in Feb but she stopped taking it. Was feeling sick on the stomach so stopped it after 1 week. She never tried it again but still has it. Pt here for a rash x 7 days, around neck, arms. There is no visible rash but skin is itchy. Skin also seems to burn when she is out in the sun. Has been noticing a tremor at times with fine motor activity. Has just been a week or so. Review of Systems - General ROS: negative         The problem list was updated as a part of today's visit.   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 9) with comorbidity ([facility] [address] E66.01    Colostomy present ([facility] [address] Z93.3    Acute kidney injury ([facility] [address] N17.9    Anemia of chronic disease D63.8    GERD without esophagitis K21.9    Hypoglycemia E16.2    Essential hypertension I10    Hyperkalemia E87.5       The PSH, FH were reviewed. SH:  Social History     Tobacco Use    Smoking status: Never Smoker    Smokeless tobacco: Never Used   Substance Use Topics    Alcohol use: No    Drug use: No       Medications/Allergies:  Current Outpatient Medications on File Prior to Visit   Medication Sig Dispense Refill    carboxymethylcellulos/glycerin (REFRESH OPTIVE OP) Apply 1 Drop to eye two (2) times a day.  minoxidil (LONITEN) 2.5 mg tablet Take 2 Tabs by mouth daily. 60 Tab 1    losartan (COZAAR) 100 mg tablet Take 1 Tab by mouth daily. 90 Tab 1    ergocalciferol (VITAMIN D2) 50,000 unit capsule take 1 capsule by mouth every 7 days 12 Cap 2    amiodarone (CORDARONE) 200 mg tablet Take 200 mg by mouth daily.  brimonidine (ALPHAGAN P) 0.1 % ophthalmic solution Administer 1 Drop to both eyes daily.  apixaban (ELIQUIS) 2.5 mg tablet Take 1 Tab by mouth two (2) times a day. (Patient taking differently: Take 5 mg by mouth two (2) times a day.) 60 Tab 2    hydroCHLOROthiazide (HYDRODIURIL) 25 mg tablet Take 25 mg by mouth daily. Indications: high blood pressure, Started 03/13/2019/ restarted by Dr Uyen Burger 3/27/2019      travoprost (TRAVATAN Z) 0.004 % ophthalmic solution Administer 1 Drop to both eyes nightly.       Blood-Glucose Meter (FREESTYLE LITE METER) monitoring kit For three times a day testing    DX: Z91.89;  E16.2, 1 Kit 0    alcohol swabs (ALCOHOL PADS) padm For three times a day testing  DX: Z91.89;  E16.2, 300 Pad 2    colesevelam (WELCHOL) 625 mg tablet Take 2 Tabs by mouth two (2) times daily (with meals). 360 Tab 3    [DISCONTINUED] albuterol (PROVENTIL HFA, VENTOLIN HFA, PROAIR HFA) 90 mcg/actuation inhaler Take 2 Puffs by inhalation every six (6) hours as needed for Wheezing. 2 Inhaler 2    [DISCONTINUED] iron bg,ps/vitC/B12/FA/calcium (NANCY FORTE PO) Take 2 Caps by mouth daily. No current facility-administered medications on file prior to visit. Allergies   Allergen Reactions    Adhesive Other (comments)     Skin tears    Amlodipine Other (comments)     Severe Headache     Amoxicillin Hives    Azelastine Itching     Eye Redness     Coricidin Hbp [Dextromethorphan-Guaifenesin] Other (comments)     Headache          Health Maintenance:   Health Maintenance   Topic Date Due    Shingrix Vaccine Age 49> (1 of 2) 12/03/1991    Pneumococcal 65+ years (1 of 1 - PPSV23) 12/03/2006    Influenza Age 5 to Adult  08/01/2020    Medicare Yearly Exam  09/11/2020    GLAUCOMA SCREENING Q2Y  06/03/2021    DTaP/Tdap/Td series (2 - Td) 08/17/2025    Bone Densitometry (Dexa) Screening  Completed       Objective:  Visit Vitals  /86   Pulse (!) 51   Temp 97.6 °F (36.4 °C) (Oral)   Resp 22   Ht 5' 1\" (1.549 m)   Wt 205 lb (93 kg)   SpO2 100%   BMI 38.73 kg/m²          Nurses notes and VS reviewed. Physical Examination: General appearance - alert, well appearing, and in no distress  Chest - clear to auscultation, no wheezes, rales or rhonchi, symmetric air entry  Heart - normal rate, regular rhythm, normal S1, S2, no murmurs, rubs, clicks or gallops  Skin - can not see any visible rash but pt reports that skin is itchy.           Labwork and Ancillary Studies:    CBC w/Diff  Lab Results   Component Value Date/Time    WBC 5.8 09/05/2019 09:57 AM    HGB 11.4 (L) 09/05/2019 09:57 AM    PLATELET 547 47/14/5975 09:57 AM         Basic Metabolic Profile  Lab Results   Component Value Date/Time    Sodium 138 09/05/2019 09:57 AM    Potassium 4.9 09/05/2019 09:57 AM Chloride 106 09/05/2019 09:57 AM    CO2 29 09/05/2019 09:57 AM    Anion gap 3 09/05/2019 09:57 AM    Glucose 86 09/05/2019 09:57 AM    BUN 59 (H) 09/05/2019 09:57 AM    Creatinine 1.61 (H) 09/05/2019 09:57 AM    BUN/Creatinine ratio 37 (H) 09/05/2019 09:57 AM    GFR est AA 38 (L) 09/05/2019 09:57 AM    GFR est non-AA 31 (L) 09/05/2019 09:57 AM    Calcium 9.3 09/05/2019 09:57 AM         LFT  Lab Results   Component Value Date/Time    ALT (SGPT) 21 09/05/2019 09:57 AM    Alk.  phosphatase 107 09/05/2019 09:57 AM    Bilirubin, direct 0.1 09/05/2019 09:57 AM    Bilirubin, total 0.4 09/05/2019 09:57 AM         Cholesterol  Lab Results   Component Value Date/Time    Cholesterol, total 207 (H) 09/05/2019 09:57 AM    HDL Cholesterol 92 (H) 09/05/2019 09:57 AM    LDL, calculated 97.6 09/05/2019 09:57 AM    Triglyceride 87 09/05/2019 09:57 AM    CHOL/HDL Ratio 2.3 09/05/2019 09:57 AM

## 2020-07-10 RX ORDER — LOSARTAN POTASSIUM 100 MG/1
100 TABLET ORAL DAILY
Qty: 90 TAB | Refills: 1 | Status: SHIPPED | OUTPATIENT
Start: 2020-07-10 | End: 2020-10-28 | Stop reason: SDUPTHER

## 2020-07-10 NOTE — TELEPHONE ENCOUNTER
Last refilled 7/9/20 .  Last refilled 1/6/20 for 90 with 1   Future Appointments   Date Time Provider Tavo Vick   9/18/2020 11:00 AM Karthikeyan Hilton MD [facility]

## 2020-07-10 NOTE — TELEPHONE ENCOUNTER
Pt calling to request med refill of:  Requested Prescriptions     Pending Prescriptions Disp Refills    losartan (COZAAR) 100 mg tablet 90 Tab 1     Sig: Take 1 Tab by mouth daily.      Be sent to [facility]    Pt was seen yesterday on 7/9/20

## 2020-07-14 ENCOUNTER — TELEPHONE (OUTPATIENT)
Dept: FAMILY MEDICINE CLINIC | Age: 80
End: 2020-07-14

## 2020-07-14 NOTE — TELEPHONE ENCOUNTER
Pt called because she was seen last week for a rash and it is now getting worse. It has spread and turned more into splotches on her skin that is at first red then darkens into a brownish color. It is also itchy and painful. She did mention that 2 years ago she had shingles and isn't sure if that might be what she has. Pt would like to know what she should do.

## 2020-07-15 NOTE — TELEPHONE ENCOUNTER
This does not sound like shingles but she should see dermatology for this. Give her names of a few groups we use if she does not have her own doctor.

## 2020-07-16 NOTE — TELEPHONE ENCOUNTER
Patient notified and given number for [facility] dermatology . She wanted a location close to home .

## 2020-08-12 ENCOUNTER — TELEPHONE (OUTPATIENT)
Dept: FAMILY MEDICINE CLINIC | Age: 80
End: 2020-08-12

## 2020-08-12 NOTE — TELEPHONE ENCOUNTER
Tell her to try 2 extra strength Tylenol three times a day, around the clock. She can also get OTC Voltaren gel to rub all over the hip region.

## 2020-08-12 NOTE — TELEPHONE ENCOUNTER
Pt called because she is having left hip pain. She tried to wait for her next appt but the pain is too much for her. She wanted to know if something could be called in or if there is anything that can be recommended OTC. She states her hip pain started last week w/ a pain scale of 9/10. She states it hurts all the time and that she can hardly walk or bend it. She did try to use a heat pad last night which did help a little bit.  Please advise     Future Appointments   Date Time Provider Tavo Vick   8/26/2020  8:30 AM Rosales Iniguez MD [facility]   9/18/2020 11:00 AM Rosales Iniguez MD [facility]

## 2020-08-26 ENCOUNTER — OFFICE VISIT (OUTPATIENT)
Dept: FAMILY MEDICINE CLINIC | Age: 80
End: 2020-08-26

## 2020-08-26 VITALS
OXYGEN SATURATION: 100 % | HEART RATE: 55 BPM | TEMPERATURE: 98.3 F | RESPIRATION RATE: 16 BRPM | HEIGHT: 61 IN | SYSTOLIC BLOOD PRESSURE: 140 MMHG | DIASTOLIC BLOOD PRESSURE: 64 MMHG | WEIGHT: 205 LBS | BODY MASS INDEX: 38.71 KG/M2

## 2020-08-26 DIAGNOSIS — Z93.3 COLOSTOMY PRESENT (HCC): ICD-10-CM

## 2020-08-26 DIAGNOSIS — I48.0 PAROXYSMAL ATRIAL FIBRILLATION (HCC): ICD-10-CM

## 2020-08-26 DIAGNOSIS — I10 ESSENTIAL HYPERTENSION: Primary | ICD-10-CM

## 2020-08-26 DIAGNOSIS — E55.9 HYPOVITAMINOSIS D: ICD-10-CM

## 2020-08-26 DIAGNOSIS — E11.21 TYPE 2 DIABETES WITH NEPHROPATHY (HCC): ICD-10-CM

## 2020-08-26 RX ORDER — ERGOCALCIFEROL 1.25 MG/1
CAPSULE ORAL
Qty: 12 CAP | Refills: 2 | Status: SHIPPED | OUTPATIENT
Start: 2020-08-26

## 2020-08-26 RX ORDER — MINOXIDIL 10 MG/1
10 TABLET ORAL DAILY
Qty: 90 TAB | Refills: 0 | Status: SHIPPED | OUTPATIENT
Start: 2020-08-26 | End: 2020-10-28 | Stop reason: SDUPTHER

## 2020-08-26 NOTE — PROGRESS NOTES
Assessment/Plan:    *Diagnoses and all orders for this visit:    1. Essential hypertension    2. Paroxysmal atrial fibrillation (HCC)  -     REFERRAL TO CARDIOLOGY    3. Colostomy present ([address]    4. Type 2 diabetes with nephropathy (HCC)  -     HEMOGLOBIN A1C W/O EAG; Future  -     MICROALBUMIN, UR, RAND W/ MICROALB/CREAT RATIO; Future  -     CBC WITH AUTOMATED DIFF; Future  -     HEPATIC FUNCTION PANEL; Future  -     LIPID PANEL; Future  -     METABOLIC PANEL, BASIC; Future  -     TSH 3RD GENERATION; Future  -     T4, FREE; Future    5. Hypovitaminosis D  -     VITAMIN D, 25 HYDROXY; Future    Other orders  -     ergocalciferol (Vitamin D2) 1,250 mcg (50,000 unit) capsule; take 1 capsule by mouth every 7 days  -     minoxidiL (LONITEN) 10 mg tablet; Take 1 Tab by mouth daily. The plan was discussed with the patient. The patient verbalized understanding and is in agreement with the plan. All medication potential side effects were discussed with the patient.    -------------------------------------------------------------------------------------------------------------------        Sher Sawyer is a 67 y.o. female and presents with Hypertension         Subjective:  Pt returns to f/u on HTN. She is back to taking all of her BP meds on our list.  Reading is still elevated. Since her last visit here, she has been to see cardiology and was given Norvasc as well but she has not started this yet. She is not always good about updating her other doctors regarding medications she is given. We are not certain that they know she is on Minoxidil. Plus, she has taken Norvasc before and it gave her headaches (but she did not recall this when she saw cardiology). DM:  Will repeat labs next time. Colostomy:  Stable. We sign her orders for her supplies. A Fib:  She is looking for a cardiology group that is near to [place], that is much more convenient for her.         Review of Systems - General ROS: negative The problem list was updated as a part of today's visit. Patient Active Problem List   Diagnosis Code    Iron deficiency anemia D50.9    Type 2 diabetes mellitus without complication, without long-term current use of insulin ([address] E11.9    Blood in stool K92.1    Secondary localized osteoarthrosis, lower leg M17.5    Unspecified otitis media H66.90    BPPV (benign paroxysmal positional vertigo) H81.10    Type 2 diabetes with nephropathy (HCC) E11.21    Colostomy present ([facility]) Z93.3    Acute kidney injury ([facility] N17.9    Anemia of chronic disease D63.8    GERD without esophagitis K21.9    Hypoglycemia E16.2    Essential hypertension I10    Hyperkalemia E87.5       The PSH, FH were reviewed. SH:  Social History     Tobacco Use    Smoking status: Never Smoker    Smokeless tobacco: Never Used   Substance Use Topics    Alcohol use: No    Drug use: No       Medications/Allergies:  Current Outpatient Medications on File Prior to Visit   Medication Sig Dispense Refill    losartan (COZAAR) 100 mg tablet Take 1 Tab by mouth daily. 90 Tab 1    carboxymethylcellulos/glycerin (REFRESH OPTIVE OP) Apply 1 Drop to eye two (2) times a day.  Iron BisGl &PS [id number] (Claudio Forte) 673-77-08-6 mg-mg-mcg-mg cap Take 2 Caps by mouth daily. 180 Cap 1    albuterol (PROVENTIL HFA, VENTOLIN HFA, PROAIR HFA) 90 mcg/actuation inhaler Take 2 Puffs by inhalation every six (6) hours as needed for Wheezing. 2 Inhaler 2    amiodarone (CORDARONE) 200 mg tablet Take 200 mg by mouth daily.  brimonidine (ALPHAGAN P) 0.1 % ophthalmic solution Administer 1 Drop to both eyes daily.  apixaban (ELIQUIS) 2.5 mg tablet Take 1 Tab by mouth two (2) times a day. (Patient taking differently: Take 5 mg by mouth two (2) times a day.) 60 Tab 2    hydroCHLOROthiazide (HYDRODIURIL) 25 mg tablet Take 25 mg by mouth daily.  Indications: high blood pressure, Started 03/13/2019/ restarted by Dr Eneida Franz 3/27/2019  travoprost (TRAVATAN Z) 0.004 % ophthalmic solution Administer 1 Drop to both eyes nightly.  Blood-Glucose Meter (FREESTYLE LITE METER) monitoring kit For three times a day testing    DX: Z91.89;  E16.2, 1 Kit 0    alcohol swabs (ALCOHOL PADS) padm For three times a day testing  DX: Z91.89;  E16.2, 300 Pad 2    colesevelam (WELCHOL) 625 mg tablet Take 2 Tabs by mouth two (2) times daily (with meals). 360 Tab 3    [DISCONTINUED] minoxidil (LONITEN) 2.5 mg tablet Take 2 Tabs by mouth daily. 60 Tab 1    [DISCONTINUED] ergocalciferol (VITAMIN D2) 50,000 unit capsule take 1 capsule by mouth every 7 days 12 Cap 2     No current facility-administered medications on file prior to visit. Allergies   Allergen Reactions    Adhesive Other (comments)     Skin tears    Amlodipine Other (comments)     Severe Headache     Amoxicillin Hives    Azelastine Itching     Eye Redness     Coricidin Hbp [Dextromethorphan-Guaifenesin] Other (comments)     Headache          Health Maintenance:   Health Maintenance   Topic Date Due    Shingrix Vaccine Age 49> (1 of 2) 12/03/1991    Pneumococcal 65+ years (1 of 1 - PPSV23) 12/03/2006    Medicare Yearly Exam  09/11/2020    GLAUCOMA SCREENING Q2Y  06/03/2021    DTaP/Tdap/Td series (2 - Td) 08/17/2025    Bone Densitometry (Dexa) Screening  Completed       Objective:  Visit Vitals  /64   Pulse (!) 55   Temp 98.3 °F (36.8 °C) (Oral)   Resp 16   Ht 5' 1\" (1.549 m)   Wt 205 lb (93 kg)   SpO2 100%   BMI 38.73 kg/m²          Nurses notes and VS reviewed.       Physical Examination: General appearance - alert, well appearing, and in no distress  Chest - clear to auscultation, no wheezes, rales or rhonchi, symmetric air entry  Heart - normal rate, regular rhythm, normal S1, S2, no murmurs, rubs, clicks or gallops          Labwork and Ancillary Studies:    CBC w/Diff  Lab Results   Component Value Date/Time    WBC 5.8 09/05/2019 09:57 AM    HGB 11.4 (L) 09/05/2019 09:57 AM    PLATELET 595 87/25/7996 09:57 AM         Basic Metabolic Profile  Lab Results   Component Value Date/Time    Sodium 138 09/05/2019 09:57 AM    Potassium 4.9 09/05/2019 09:57 AM    Chloride 106 09/05/2019 09:57 AM    CO2 29 09/05/2019 09:57 AM    Anion gap 3 09/05/2019 09:57 AM    Glucose 86 09/05/2019 09:57 AM    BUN 59 (H) 09/05/2019 09:57 AM    Creatinine 1.61 (H) 09/05/2019 09:57 AM    BUN/Creatinine ratio 37 (H) 09/05/2019 09:57 AM    GFR est AA 38 (L) 09/05/2019 09:57 AM    GFR est non-AA 31 (L) 09/05/2019 09:57 AM    Calcium 9.3 09/05/2019 09:57 AM         LFT  Lab Results   Component Value Date/Time    ALT (SGPT) 21 09/05/2019 09:57 AM    Alk.  phosphatase 107 09/05/2019 09:57 AM    Bilirubin, direct 0.1 09/05/2019 09:57 AM    Bilirubin, total 0.4 09/05/2019 09:57 AM         Cholesterol  Lab Results   Component Value Date/Time    Cholesterol, total 207 (H) 09/05/2019 09:57 AM    HDL Cholesterol 92 (H) 09/05/2019 09:57 AM    LDL, calculated 97.6 09/05/2019 09:57 AM    Triglyceride 87 09/05/2019 09:57 AM    CHOL/HDL Ratio 2.3 09/05/2019 09:57 AM

## 2020-08-26 NOTE — PROGRESS NOTES
Elsa Willis is a 67 y.o. female (: 1940) presenting to address:    Chief Complaint   Patient presents with    Hypertension       Vitals:    20 0826   BP: 158/80   Pulse: (!) 55   Resp: 16   Temp: 98.3 °F (36.8 °C)   TempSrc: Oral   SpO2: 100%   Weight: 205 lb (93 kg)   Height: 5' 1\" (1.549 m)   PainSc:   7   PainLoc: Hip       Hearing/Vision:   No exam data present    Learning Assessment:     Learning Assessment 2015   PRIMARY LEARNER Patient   HIGHEST LEVEL OF EDUCATION - PRIMARY LEARNER  DID NOT GRADUATE HIGH SCHOOL   BARRIERS PRIMARY LEARNER NONE   CO-LEARNER CAREGIVER No   PRIMARY LANGUAGE ENGLISH    NEED No   LEARNER PREFERENCE PRIMARY DEMONSTRATION   ANSWERED BY Patient   RELATIONSHIP SELF     Depression Screening:     3 most recent PHQ Screens 2020   Little interest or pleasure in doing things Not at all   Feeling down, depressed, irritable, or hopeless Not at all   Total Score PHQ 2 0     Fall Risk Assessment:     Fall Risk Assessment, last 12 mths 2019   Able to walk? Yes   Fall in past 12 months? No   Fall with injury? -   Number of falls in past 12 months -   Fall Risk Score -     Abuse Screening:     Abuse Screening Questionnaire 2018   Do you ever feel afraid of your partner? N   Are you in a relationship with someone who physically or mentally threatens you? N   Is it safe for you to go home? Y     Coordination of Care Questionaire:   1. Have you been to the ER, urgent care clinic since your last visit? Hospitalized since your last visit? Yes Urgent care     2. Have you seen or consulted any other health care providers outside of the [address] [place] [address] since your last visit? Include any pap smears or colon screening. YES cardio    Advanced Directive:   1. Do you have an Advanced Directive? NO    2. Would you like information on Advanced Directives?  NO

## 2020-08-26 NOTE — PATIENT INSTRUCTIONS
Learning About High Blood Pressure What is high blood pressure? Blood pressure is a measure of how hard the blood pushes against the walls of your arteries. It's normal for blood pressure to go up and down throughout the day. But if it stays up, you have high blood pressure. Another name for high blood pressure is hypertension. Two numbers tell you your blood pressure. The first number is the systolic pressure (top number). It shows how hard the blood pushes when your heart is pumping. The second number is the diastolic pressure (bottom number). It shows how hard the blood pushes between heartbeats, when your heart is relaxed and filling with blood. Your doctor will give you a goal for your blood pressure based on your health and your age. High blood pressure (hypertension) means that the top number stays high, or the bottom number stays high, or both. High blood pressure increases the risk of stroke, heart attack, and other problems. What happens when you have high blood pressure? · Blood flows through your arteries with too much force. Over time, this damages the walls of your arteries. But you can't feel it. High blood pressure usually doesn't cause symptoms. · Fat and calcium start to build up in your arteries. This buildup is called plaque. Plaque makes your arteries narrower and stiffer. Blood can't flow through them as easily. · This lack of good blood flow starts to damage some of the organs in your body. This can lead to problems such as coronary artery disease and heart attack, heart failure, stroke, kidney failure, and eye damage. How can you prevent high blood pressure? · Stay at a healthy weight. · Try to limit how much sodium you eat to less than 2,300 milligrams (mg) a day. If you limit your sodium to 1,500 mg a day, you can lower your blood pressure even more. ? Buy foods that are labeled \"unsalted,\" \"sodium-free,\" or \"low-sodium. \" Foods labeled \"reduced-sodium\" and \"light sodium\" may still have too much sodium. ? Flavor your food with garlic, lemon juice, onion, vinegar, herbs, and spices instead of salt. Do not use soy sauce, steak sauce, onion salt, garlic salt, mustard, or ketchup on your food. ? Use less salt (or none) when recipes call for it. You can often use half the salt a recipe calls for without losing flavor. · Be physically active. Get at least 30 minutes of exercise on most days of the week. Walking is a good choice. You also may want to do other activities, such as running, swimming, cycling, or playing tennis or team sports. · Limit alcohol to 2 drinks a day for men and 1 drink a day for women. · Eat plenty of fruits, vegetables, and low-fat dairy products. Eat less saturated and total fats. How is high blood pressure treated? · Your doctor will suggest making lifestyle changes to help your heart. For example, your doctor may ask you to eat healthy foods, quit smoking, lose extra weight, and be more active. · If lifestyle changes don't help enough, your doctor may recommend that you take medicine. · When blood pressure is very high, medicines are needed to lower it. Follow-up care is a key part of your treatment and safety. Be sure to make and go to all appointments, and call your doctor if you are having problems. It's also a good idea to know your test results and keep a list of the medicines you take. Where can you learn more? Go to http://liliam-adrianna.info/ Enter P501 in the search box to learn more about \"Learning About High Blood Pressure. \" Current as of: December 16, 2019               Content Version: 12.5 © 5858-6372 [employer]. Care instructions adapted under license by [employer] (which disclaims liability or warranty for this information).  If you have questions about a medical condition or this instruction, always ask your healthcare professional. [address] any warranty or liability for your use of this information.

## 2020-09-11 ENCOUNTER — HOSPITAL ENCOUNTER (OUTPATIENT)
Dept: LAB | Age: 80
Discharge: HOME | End: 2020-09-11
Payer: MEDICARE

## 2020-09-11 DIAGNOSIS — E55.9 HYPOVITAMINOSIS D: ICD-10-CM

## 2020-09-11 DIAGNOSIS — E11.21 TYPE 2 DIABETES WITH NEPHROPATHY (HCC): ICD-10-CM

## 2020-09-11 LAB
25(OH)D3 SERPL-MCNC: 36.5 NG/ML (ref 30–100)
ALBUMIN SERPL-MCNC: 3.7 G/DL (ref 3.4–5)
ALBUMIN/GLOB SERPL: 1.1 {RATIO} (ref 0.8–1.7)
ALP SERPL-CCNC: 58 U/L (ref 45–117)
ALT SERPL-CCNC: 35 U/L (ref 13–56)
ANION GAP SERPL CALC-SCNC: 8 MMOL/L (ref 3–18)
AST SERPL-CCNC: 26 U/L (ref 10–38)
BASOPHILS # BLD: 0 K/UL (ref 0–0.1)
BASOPHILS NFR BLD: 0 % (ref 0–2)
BILIRUB DIRECT SERPL-MCNC: 0.2 MG/DL (ref 0–0.2)
BILIRUB SERPL-MCNC: 0.5 MG/DL (ref 0.2–1)
BUN SERPL-MCNC: 48 MG/DL (ref 7–18)
BUN/CREAT SERPL: 20 (ref 12–20)
CALCIUM SERPL-MCNC: 9.1 MG/DL (ref 8.5–10.1)
CHLORIDE SERPL-SCNC: 112 MMOL/L (ref 100–111)
CHOLEST SERPL-MCNC: 233 MG/DL
CO2 SERPL-SCNC: 23 MMOL/L (ref 21–32)
CREAT SERPL-MCNC: 2.37 MG/DL (ref 0.6–1.3)
CREAT UR-MCNC: 50 MG/DL (ref 30–125)
DIFFERENTIAL METHOD BLD: ABNORMAL
EOSINOPHIL # BLD: 0.1 K/UL (ref 0–0.4)
EOSINOPHIL NFR BLD: 1 % (ref 0–5)
ERYTHROCYTE [DISTWIDTH] IN BLOOD BY AUTOMATED COUNT: 14.4 % (ref 11.6–14.5)
GLOBULIN SER CALC-MCNC: 3.4 G/DL (ref 2–4)
GLUCOSE SERPL-MCNC: 93 MG/DL (ref 74–99)
HBA1C MFR BLD: 5.2 % (ref 4.2–5.6)
HCT VFR BLD AUTO: 32.2 % (ref 35–45)
HDLC SERPL-MCNC: 114 MG/DL (ref 40–60)
HDLC SERPL: 2 {RATIO} (ref 0–5)
HGB BLD-MCNC: 10.2 G/DL (ref 12–16)
LDLC SERPL CALC-MCNC: 99 MG/DL (ref 0–100)
LIPID PROFILE,FLP: ABNORMAL
LYMPHOCYTES # BLD: 2.7 K/UL (ref 0.9–3.6)
LYMPHOCYTES NFR BLD: 44 % (ref 21–52)
MCH RBC QN AUTO: 29.7 PG (ref 24–34)
MCHC RBC AUTO-ENTMCNC: 31.7 G/DL (ref 31–37)
MCV RBC AUTO: 93.6 FL (ref 74–97)
MICROALBUMIN UR-MCNC: 15.5 MG/DL (ref 0–3)
MICROALBUMIN/CREAT UR-RTO: 310 MG/G (ref 0–30)
MONOCYTES # BLD: 0.4 K/UL (ref 0.05–1.2)
MONOCYTES NFR BLD: 7 % (ref 3–10)
NEUTS SEG # BLD: 2.8 K/UL (ref 1.8–8)
NEUTS SEG NFR BLD: 48 % (ref 40–73)
PLATELET # BLD AUTO: 195 K/UL (ref 135–420)
PMV BLD AUTO: 10.6 FL (ref 9.2–11.8)
POTASSIUM SERPL-SCNC: 4.8 MMOL/L (ref 3.5–5.5)
PROT SERPL-MCNC: 7.1 G/DL (ref 6.4–8.2)
RBC # BLD AUTO: 3.44 M/UL (ref 4.2–5.3)
SODIUM SERPL-SCNC: 143 MMOL/L (ref 136–145)
T4 FREE SERPL-MCNC: 1.5 NG/DL (ref 0.7–1.5)
TRIGL SERPL-MCNC: 100 MG/DL (ref ?–150)
TSH SERPL DL<=0.05 MIU/L-ACNC: 1.42 UIU/ML (ref 0.36–3.74)
VLDLC SERPL CALC-MCNC: 20 MG/DL
WBC # BLD AUTO: 6 K/UL (ref 4.6–13.2)

## 2020-09-11 PROCEDURE — 82043 UR ALBUMIN QUANTITATIVE: CPT

## 2020-09-11 PROCEDURE — 80061 LIPID PANEL: CPT

## 2020-09-11 PROCEDURE — 82306 VITAMIN D 25 HYDROXY: CPT

## 2020-09-11 PROCEDURE — 85025 COMPLETE CBC W/AUTO DIFF WBC: CPT

## 2020-09-11 PROCEDURE — 80048 BASIC METABOLIC PNL TOTAL CA: CPT

## 2020-09-11 PROCEDURE — 83036 HEMOGLOBIN GLYCOSYLATED A1C: CPT

## 2020-09-11 PROCEDURE — 36415 COLL VENOUS BLD VENIPUNCTURE: CPT

## 2020-09-11 PROCEDURE — 84443 ASSAY THYROID STIM HORMONE: CPT

## 2020-09-11 PROCEDURE — 84439 ASSAY OF FREE THYROXINE: CPT

## 2020-09-11 PROCEDURE — 80076 HEPATIC FUNCTION PANEL: CPT

## 2020-10-02 ENCOUNTER — PATIENT OUTREACH (OUTPATIENT)
Dept: CASE MANAGEMENT | Age: 80
End: 2020-10-02

## 2020-10-02 RX ORDER — CARVEDILOL 3.12 MG/1
3.12 TABLET, FILM COATED ORAL 2 TIMES DAILY WITH MEALS
COMMUNITY
End: 2020-10-28 | Stop reason: SDUPTHER

## 2020-10-02 RX ORDER — BIMATOPROST 0.1 MG/ML
1 SOLUTION/ DROPS OPHTHALMIC EVERY EVENING
COMMUNITY
Start: 2020-08-28

## 2020-10-02 RX ORDER — ACETAMINOPHEN 325 MG/1
650 TABLET ORAL
COMMUNITY
Start: 2020-09-30 | End: 2020-10-10

## 2020-10-02 RX ORDER — LANOLIN ALCOHOL/MO/W.PET/CERES
6 CREAM (GRAM) TOPICAL
COMMUNITY
Start: 2020-09-30 | End: 2020-11-23 | Stop reason: ALTCHOICE

## 2020-10-02 RX ORDER — FAMOTIDINE 20 MG/1
20 TABLET, FILM COATED ORAL DAILY
COMMUNITY
Start: 2020-10-01

## 2020-10-02 RX ORDER — AMLODIPINE BESYLATE 5 MG/1
5 TABLET ORAL DAILY
COMMUNITY
End: 2020-10-29 | Stop reason: SDUPTHER

## 2020-10-02 RX ORDER — ONDANSETRON 4 MG/1
4 TABLET, ORALLY DISINTEGRATING ORAL
COMMUNITY
Start: 2020-09-30 | End: 2020-10-07

## 2020-10-02 RX ORDER — ADHESIVE BANDAGE
30 BANDAGE TOPICAL AS NEEDED
COMMUNITY
Start: 2020-09-30 | End: 2020-11-23 | Stop reason: ALTCHOICE

## 2020-10-02 RX ORDER — TRIAMCINOLONE ACETONIDE 1 MG/G
1 CREAM TOPICAL DAILY
COMMUNITY
Start: 2020-08-05 | End: 2020-11-23 | Stop reason: ALTCHOICE

## 2020-10-02 RX ORDER — BUMETANIDE 1 MG/1
1 TABLET ORAL DAILY
COMMUNITY
End: 2020-10-28 | Stop reason: SDUPTHER

## 2020-10-02 RX ORDER — HYDRALAZINE HYDROCHLORIDE 100 MG/1
100 TABLET, FILM COATED ORAL 3 TIMES DAILY
COMMUNITY
Start: 2020-09-30 | End: 2020-10-28 | Stop reason: SDUPTHER

## 2020-10-02 RX ORDER — TRAMADOL HYDROCHLORIDE 50 MG/1
50 TABLET ORAL
COMMUNITY
Start: 2020-09-30 | End: 2020-11-23 | Stop reason: ALTCHOICE

## 2020-10-02 RX ORDER — ISOSORBIDE DINITRATE 20 MG/1
20 TABLET ORAL 3 TIMES DAILY
COMMUNITY
Start: 2020-09-30 | End: 2020-10-28 | Stop reason: SDUPTHER

## 2020-10-02 RX ORDER — GUAIFENESIN 100 MG/5ML
200 SOLUTION ORAL
COMMUNITY
Start: 2020-09-30 | End: 2020-10-07

## 2020-10-02 RX ORDER — COLESEVELAM 180 1/1
625 TABLET ORAL 2 TIMES WEEKLY
COMMUNITY

## 2020-10-02 NOTE — PROGRESS NOTES
Patient was admitted to THE [facility] on 20 and discharged on 10/1/20 for Poss Bowel Obstruction and Hernia surgery. She was discharged to [address] [place] [address] for continued rehab. Advance Care Planning:   Does patient have an Advance Directive:  none on file    Inpatient Readmission Risk score: 6 %  Was this a readmission? no     Care Transition Nurse (CTN) contacted the [facility] by telephone to perform post hospital medication reconciliation. Verified name and  with nurse as identifiers. Provided introduction to self, and explanation of the CTN role. Medication reconciliation was performed with nurse Cl at [facility]. Patient has had a few medication changes since admission to the facility. The Lasix and Procardia were stopped and she is now on Bumex, Coreg and Norvasc. All other medications were reconciled. This patient is in a facility that is part of our [facility] and will be followed by the team while she is at [facility].     Paris Michael RN

## 2020-10-15 ENCOUNTER — PATIENT OUTREACH (OUTPATIENT)
Dept: CASE MANAGEMENT | Age: 80
End: 2020-10-15

## 2020-10-15 NOTE — PROGRESS NOTES
Community Care Team Documentation for Patient in [facility]     Patient discharged from THE [facility] to [address] of [address], on 10/1/20. Hospital Discharge diagnosis:       Bowel Obstruction s/p abdominal Surgery    SNF Attending Provider:      Anticipated discharge date from SNF:  TBD    PCP : Clemencia Lino MD    CTN: Larose Fleischer, [place] Team rounds completed, updates provided by facility. Brief Summary of Care:    Patient receiving PT/OT. FC, SBA for Transfers. Amb 40 ft with CGA. Working on weaning oxygen. Hgb slowly dropping - may need transfusion. Weight stable. Dispo:  Patient was living with  PTA.  in hospital also (or has been recently). Other family members will need to assist pt and  on d/c. RRAT:  Medium Risk            14       Total Score        14 Charlson Comorbidity Score (Age + Comorbid Conditions)        Criteria that do not apply:    Has Seen PCP in Last 6 Months (Yes=3, No=0)    . Living with Significant Other. Assisted Living. LTAC. SNF. or   Rehab    Patient Length of Stay (>5 days = 3)    IP Visits Last 12 Months (1-3=4, 4=9, >4=11)    Pt.  Coverage (Medicare=5 , Medicaid, or Self-Pay=4)        Active Ambulatory Problems     Diagnosis Date Noted    Iron deficiency anemia 05/22/2014    Type 2 diabetes mellitus without complication, without long-term current use of insulin ([address] 05/22/2014    Blood in stool 05/22/2014    Secondary localized osteoarthrosis, lower leg 05/22/2014    Unspecified otitis media 05/22/2014    BPPV (benign paroxysmal positional vertigo) 12/04/2016    Type 2 diabetes with nephropathy ([address] 03/20/2018    Colostomy present ([address]     Acute kidney injury ([address] 02/16/2019    Anemia of chronic disease 02/16/2019    GERD without esophagitis 02/16/2019    Hypoglycemia 02/16/2019    Essential hypertension 02/25/2019    Hyperkalemia 03/20/2019     Resolved Ambulatory Problems     Diagnosis Date Noted    No Resolved Ambulatory Problems     Past Medical History:   Diagnosis Date    Cancer ([facility]     Diabetes ([facility]     Hypertension

## 2020-10-21 ENCOUNTER — PATIENT OUTREACH (OUTPATIENT)
Dept: CASE MANAGEMENT | Age: 80
End: 2020-10-21

## 2020-10-22 NOTE — PROGRESS NOTES
Community Care Team Documentation for Patient in Located within [facility]     Patient discharged from THE [facility] to [address] of [address], on 10/1/20. Hospital Discharge diagnosis:       Bowel Obstruction s/p abdominal Surgery    SNF Attending Provider:      Anticipated discharge date from SNF:  10/25/20    PCP : Robert Plata MD    CTN: Arlene Driver Team rounds completed, updates provided by facility. Brief Summary of Care:    Patient receiving PT/OT. FC, SBA for Transfers. Amb 40 ft with CGA. Off Oxygen. Hgb stable at 7.2. Weight stable. Dispo:  Patient will be discharged home on 10/25/20 with . They will use [facility]. Fusion-ios and transport chair. RRAT:  Medium Risk            14       Total Score        14 Charlson Comorbidity Score (Age + Comorbid Conditions)        Criteria that do not apply:    Has Seen PCP in Last 6 Months (Yes=3, No=0)    . Living with Significant Other. Assisted Living. LTAC. SNF. or   Rehab    Patient Length of Stay (>5 days = 3)    IP Visits Last 12 Months (1-3=4, 4=9, >4=11)    Pt.  Coverage (Medicare=5 , Medicaid, or Self-Pay=4)        Active Ambulatory Problems     Diagnosis Date Noted    Iron deficiency anemia 05/22/2014    Type 2 diabetes mellitus without complication, without long-term current use of insulin ([address] 05/22/2014    Blood in stool 05/22/2014    Secondary localized osteoarthrosis, lower leg 05/22/2014    Unspecified otitis media 05/22/2014    BPPV (benign paroxysmal positional vertigo) 12/04/2016    Type 2 diabetes with nephropathy ([address] 03/20/2018    Colostomy present ([address]     Acute kidney injury ([address] 02/16/2019    Anemia of chronic disease 02/16/2019    GERD without esophagitis 02/16/2019    Hypoglycemia 02/16/2019    Essential hypertension 02/25/2019    Hyperkalemia 03/20/2019     Resolved Ambulatory Problems     Diagnosis Date Noted    No Resolved Ambulatory Problems     Past Medical History:   Diagnosis Date    [facility]     [facility]     Hypertension

## 2020-10-26 ENCOUNTER — OFFICE VISIT (OUTPATIENT)
Dept: FAMILY MEDICINE CLINIC | Age: 80
End: 2020-10-26
Payer: MEDICARE

## 2020-10-26 VITALS
BODY MASS INDEX: 38.71 KG/M2 | TEMPERATURE: 97.4 F | SYSTOLIC BLOOD PRESSURE: 158 MMHG | HEART RATE: 59 BPM | HEIGHT: 61 IN | DIASTOLIC BLOOD PRESSURE: 50 MMHG | RESPIRATION RATE: 22 BRPM | WEIGHT: 205 LBS | OXYGEN SATURATION: 100 %

## 2020-10-26 DIAGNOSIS — I10 ESSENTIAL HYPERTENSION: ICD-10-CM

## 2020-10-26 DIAGNOSIS — K43.3 PARASTOMAL HERNIA WITH OBSTRUCTION AND WITHOUT GANGRENE: Primary | ICD-10-CM

## 2020-10-26 DIAGNOSIS — Z93.3 COLOSTOMY PRESENT (HCC): ICD-10-CM

## 2020-10-26 PROBLEM — E66.01 SEVERE OBESITY (HCC): Status: ACTIVE | Noted: 2020-10-26

## 2020-10-26 PROBLEM — I48.0 PAROXYSMAL ATRIAL FIBRILLATION (HCC): Status: ACTIVE | Noted: 2020-01-01

## 2020-10-26 PROBLEM — I50.22 CHRONIC SYSTOLIC HEART FAILURE (HCC): Status: ACTIVE | Noted: 2020-10-01

## 2020-10-26 PROBLEM — N18.30 CHRONIC RENAL FAILURE SYNDROME, STAGE 3 (MODERATE) (HCC): Status: ACTIVE | Noted: 2020-10-01

## 2020-10-26 PROCEDURE — 1101F PT FALLS ASSESS-DOCD LE1/YR: CPT | Performed by: INTERNAL MEDICINE

## 2020-10-26 PROCEDURE — G8536 NO DOC ELDER MAL SCRN: HCPCS | Performed by: INTERNAL MEDICINE

## 2020-10-26 PROCEDURE — G8753 SYS BP > OR = 140: HCPCS | Performed by: INTERNAL MEDICINE

## 2020-10-26 PROCEDURE — 1090F PRES/ABSN URINE INCON ASSESS: CPT | Performed by: INTERNAL MEDICINE

## 2020-10-26 PROCEDURE — G8432 DEP SCR NOT DOC, RNG: HCPCS | Performed by: INTERNAL MEDICINE

## 2020-10-26 PROCEDURE — G8427 DOCREV CUR MEDS BY ELIG CLIN: HCPCS | Performed by: INTERNAL MEDICINE

## 2020-10-26 PROCEDURE — 99214 OFFICE O/P EST MOD 30 MIN: CPT | Performed by: INTERNAL MEDICINE

## 2020-10-26 PROCEDURE — G8754 DIAS BP LESS 90: HCPCS | Performed by: INTERNAL MEDICINE

## 2020-10-26 PROCEDURE — G8417 CALC BMI ABV UP PARAM F/U: HCPCS | Performed by: INTERNAL MEDICINE

## 2020-10-26 PROCEDURE — G8399 PT W/DXA RESULTS DOCUMENT: HCPCS | Performed by: INTERNAL MEDICINE

## 2020-10-26 PROCEDURE — G0463 HOSPITAL OUTPT CLINIC VISIT: HCPCS | Performed by: INTERNAL MEDICINE

## 2020-10-26 NOTE — PROGRESS NOTES
Assessment/Plan:    *Diagnoses and all orders for this visit:    1. Parastomal hernia with obstruction and without gangrene  -     REFERRAL TO GASTROENTEROLOGY    2. Essential hypertension    3. Colostomy present ([facility]  -     REFERRAL TO GASTROENTEROLOGY        Routine f/u in early Dec for HTN and .    BP meds to remain the same. The plan was discussed with the patient. The patient verbalized understanding and is in agreement with the plan. All medication potential side effects were discussed with the patient.    -------------------------------------------------------------------------------------------------------------------        Eloy Manriquez is a 67 y.o. female and presents with Hospital Follow Up (hernia repair)         Subjective:  Pt was hospitalized 9/18/20 to 10/1/20 for an acute incarceration of parastomal hernia. This was repaired. She also had trouble with her BP while there. Meds have been changed. Just sitting here for the visit, here was a huge drop in the reading. She will be seeing a new cardiologist for her cardiac conditions. She also needs to f/u with her GI, Dr. Alonzo Caballero as he manages the colostomy for her. She has had some mild SOB but her O2 sats are 100%. HTN;  Was high at first but improving. She can monitor at home. Just got out of rehab yesterday. Review of Systems - General ROS: negative         The problem list was updated as a part of today's visit.   Patient Active Problem List   Diagnosis Code    Iron deficiency anemia D50.9    Type 2 diabetes mellitus without complication, without long-term current use of insulin ([facility] E11.9    Blood in stool K92.1    Secondary localized osteoarthrosis, lower leg M17.5    Unspecified otitis media H66.90    BPPV (benign paroxysmal positional vertigo) H81.10    Type 2 diabetes with nephropathy (HCC) E11.21    Colostomy present ([address] Z93.3    Acute kidney injury (HCC) N17.9    Anemia of chronic disease D63.8  GERD without esophagitis K21.9    Hypoglycemia E16.2    Essential hypertension I10    Hyperkalemia E87.5    Severe obesity (HCC) E66.01    Paroxysmal atrial fibrillation (HCC) I48.0    Chronic systolic heart failure (HCC) I50.22    Chronic renal failure syndrome, stage 3 (moderate) N18.30       The PSH,  were reviewed. SH:  Social History     Tobacco Use    Smoking status: Never Smoker    Smokeless tobacco: Never Used   Substance Use Topics    Alcohol use: No    Drug use: No       Medications/Allergies:  Current Outpatient Medications on File Prior to Visit   Medication Sig Dispense Refill    famotidine (PEPCID) 20 mg tablet Take 20 mg by mouth daily.  hydrALAZINE (APRESOLINE) 100 mg tablet Take 100 mg by mouth three (3) times daily.  isosorbide dinitrate (ISORDIL) 20 mg tablet Take 20 mg by mouth three (3) times daily.  Lumigan 0.01 % ophthalmic drops Administer 1 Drop to both eyes every evening.  apixaban (ELIQUIS) 2.5 mg tablet Take 2.5 mg by mouth two (2) times a day.  colesevelam (WELCHOL) 625 mg tablet Take 625 mg by mouth two (2) times a week.  triamcinolone acetonide (KENALOG) 0.1 % topical cream Apply 1 Applicator to affected area daily.  amLODIPine (Norvasc) 5 mg tablet Take 5 mg by mouth daily.  bumetanide (BUMEX) 1 mg tablet Take  by mouth daily.  carvediloL (Coreg) 3.125 mg tablet Take  by mouth two (2) times daily (with meals).  ergocalciferol (Vitamin D2) 1,250 mcg (50,000 unit) capsule take 1 capsule by mouth every 7 days 12 Cap 2    carboxymethylcellulos/glycerin (REFRESH OPTIVE OP) Apply 1 Drop to eye two (2) times a day.  albuterol (PROVENTIL HFA, VENTOLIN HFA, PROAIR HFA) 90 mcg/actuation inhaler Take 2 Puffs by inhalation every six (6) hours as needed for Wheezing. 2 Inhaler 2    amiodarone (CORDARONE) 200 mg tablet Take 200 mg by mouth daily.       brimonidine (ALPHAGAN P) 0.1 % ophthalmic solution Administer 1 Drop to both eyes daily.  travoprost (TRAVATAN Z) 0.004 % ophthalmic solution Administer 1 Drop to both eyes nightly.  Blood-Glucose Meter (FREESTYLE LITE METER) monitoring kit For three times a day testing    DX: Z91.89;  E16.2, 1 Kit 0    alcohol swabs (ALCOHOL PADS) padm For three times a day testing  DX: Z91.89;  E16.2, 300 Pad 2    magnesium hydroxide (MILK OF MAGNESIA) 400 mg/5 mL suspension Take 30 mL by mouth as needed.  melatonin 3 mg tablet Take 6 mg by mouth nightly as needed.  traMADoL (ULTRAM) 50 mg tablet Take 50 mg by mouth every twelve (12) hours as needed. q 12 hrs as needed up to 30 doses      minoxidiL (LONITEN) 10 mg tablet Take 1 Tab by mouth daily. 90 Tab 0    losartan (COZAAR) 100 mg tablet Take 1 Tab by mouth daily. 90 Tab 1    Iron BisGl &PS [id number] (Claudio Forte) 060-30-23-6 mg-mg-mcg-mg cap Take 2 Caps by mouth daily. 180 Cap 1    hydroCHLOROthiazide (HYDRODIURIL) 25 mg tablet Take 25 mg by mouth daily. Indications: high blood pressure, Started 03/13/2019/ restarted by Dr Ch  3/27/2019       No current facility-administered medications on file prior to visit.          Allergies   Allergen Reactions    Adhesive Other (comments)     Skin tears    Amlodipine Other (comments)     Severe Headache     Amoxicillin Hives    Azelastine Itching     Eye Redness     Coricidin Hbp [Dextromethorphan-Guaifenesin] Other (comments)     Headache          Health Maintenance:   Health Maintenance   Topic Date Due    Shingrix Vaccine Age 49> (1 of 2) 12/03/1991    Pneumococcal 65+ years (1 of 1 - PPSV23) 12/03/2006    Medicare Yearly Exam  09/11/2020    GLAUCOMA SCREENING Q2Y  06/03/2021    DTaP/Tdap/Td series (2 - Td) 08/17/2025    Bone Densitometry (Dexa) Screening  Completed    Flu Vaccine  Completed       Objective:  Visit Vitals  BP (!) 158/50   Pulse (!) 59   Temp 97.4 °F (36.3 °C) (Temporal)   Resp 22   Ht 5' 1\" (1.549 m)   Wt 205 lb (93 kg)   SpO2 100% BMI 38.73 kg/m²          Nurses notes and VS reviewed. Physical Examination: General appearance - alert, well appearing, and in no distress  Chest - clear to auscultation, no wheezes, rales or rhonchi, symmetric air entry  Heart - normal rate and regular rhythm  Abdomen - soft, nontender, nondistended, no masses or organomegaly          Labwork and Ancillary Studies:    CBC w/Diff  Lab Results   Component Value Date/Time    WBC 6.0 09/11/2020 10:17 AM    HGB 10.2 (L) 09/11/2020 10:17 AM    PLATELET 255 66/80/9299 10:17 AM         Basic Metabolic Profile  Lab Results   Component Value Date/Time    Sodium 143 09/11/2020 10:17 AM    Potassium 4.8 09/11/2020 10:17 AM    Chloride 112 (H) 09/11/2020 10:17 AM    CO2 23 09/11/2020 10:17 AM    Anion gap 8 09/11/2020 10:17 AM    Glucose 93 09/11/2020 10:17 AM    BUN 48 (H) 09/11/2020 10:17 AM    Creatinine 2.37 (H) 09/11/2020 10:17 AM    BUN/Creatinine ratio 20 09/11/2020 10:17 AM    GFR est AA 24 (L) 09/11/2020 10:17 AM    GFR est non-AA 20 (L) 09/11/2020 10:17 AM    Calcium 9.1 09/11/2020 10:17 AM         LFT  Lab Results   Component Value Date/Time    ALT (SGPT) 35 09/11/2020 10:17 AM    Alk.  phosphatase 58 09/11/2020 10:17 AM    Bilirubin, direct 0.2 09/11/2020 10:17 AM    Bilirubin, total 0.5 09/11/2020 10:17 AM         Cholesterol  Lab Results   Component Value Date/Time    Cholesterol, total 233 (H) 09/11/2020 10:17 AM    HDL Cholesterol 114 (H) 09/11/2020 10:17 AM    LDL, calculated 99 09/11/2020 10:17 AM    Triglyceride 100 09/11/2020 10:17 AM    CHOL/HDL Ratio 2.0 09/11/2020 10:17 AM

## 2020-10-26 NOTE — PROGRESS NOTES
Satish Mayer is a 67 y.o. female (: 1940) presenting to address:    Chief Complaint   Patient presents with   [facility] Follow Up     hernia repair       Vitals:    10/26/20 1333   BP: (!) 187/49   Pulse: (!) 59   Resp: 22   Temp: 97.4 °F (36.3 °C)   TempSrc: Temporal   SpO2: 100%   Weight: 205 lb (93 kg)   Height: 5' 1\" (1.549 m)   PainSc:   0 - No pain       Hearing/Vision:   No exam data present    Learning Assessment:     Learning Assessment 2015   PRIMARY LEARNER Patient   HIGHEST LEVEL OF EDUCATION - PRIMARY LEARNER  DID NOT GRADUATE HIGH SCHOOL   BARRIERS PRIMARY LEARNER NONE   CO-LEARNER CAREGIVER No   PRIMARY LANGUAGE ENGLISH    NEED No   LEARNER PREFERENCE PRIMARY DEMONSTRATION   ANSWERED BY Patient   RELATIONSHIP SELF     Depression Screening:     3 most recent PHQ Screens 2020   Little interest or pleasure in doing things Not at all   Feeling down, depressed, irritable, or hopeless Not at all   Total Score PHQ 2 0     Fall Risk Assessment:     Fall Risk Assessment, last 12 mths 2019   Able to walk? Yes   Fall in past 12 months? No   Fall with injury? -   Number of falls in past 12 months -   Fall Risk Score -     Abuse Screening:     Abuse Screening Questionnaire 2018   Do you ever feel afraid of your partner? N   Are you in a relationship with someone who physically or mentally threatens you? N   Is it safe for you to go home? Y     Coordination of Care Questionaire:   1. Have you been to the ER, urgent care clinic since your last visit? Hospitalized since your last visit? Yes hospital     2. Have you seen or consulted any other health care providers outside of the [address] [place] [address] since your last visit? Include any pap smears or colon screening. NO    Advanced Directive:   1. Do you have an Advanced Directive? NO    2. Would you like information on Advanced Directives?  NO

## 2020-10-28 ENCOUNTER — PATIENT OUTREACH (OUTPATIENT)
Dept: CASE MANAGEMENT | Age: 80
End: 2020-10-28

## 2020-10-28 RX ORDER — TRAVOPROST OPHTHALMIC SOLUTION 0.04 MG/ML
1 SOLUTION OPHTHALMIC
Status: CANCELLED | OUTPATIENT
Start: 2020-10-28

## 2020-10-28 NOTE — PROGRESS NOTES
Patient was admitted to THE [facility] on 20 and discharged on 10/01/20 for Bowel Obstruction. Outreach made within 2 business days of discharge: Yes       Advance Care Planning:   Does patient have an Advance Directive:  currently not on file; education provided     Inpatient Readmission Risk score: 6%  Was this a readmission? no     Patients top risk factors for readmission: medical condition  Interventions to address risk factors: home health, f/u appt with PCP    Care Transition Nurse (CTN) contacted the patient by telephone to perform post hospital discharge assessment. Verified name and  with patient as identifiers. Provided introduction to self, and explanation of the CTN role. CTN reviewed discharge instructions, medical action plan and red flags with patient who verbalized understanding. Patient given an opportunity to ask questions and does not have any further questions or concerns at this time. The patient agrees to contact the PCP office for questions related to their healthcare. Medication reconciliation was not performed - pt just saw her PCP yesterday and states medications were reviewed at that time. Referral to Pharm D needed: no     Home Health/Outpatient orders at discharge: home health care, PT, OT and [address]: [facility]  Date of initial visit: 10/30/20    Durable Medical Equipment ordered at discharge: Wheelchair and Canes/Walkers/Crutches  [address]: unknown   [facility] received: has already received. Covid Risk Education    Patient has following risk factors of: heart failure and diabetes. Education provided regarding infection prevention, and signs and symptoms of COVID-19 and when to seek medical attention with patient who verbalized understanding. Discussed exposure protocols and quarantine From CDC: Are you at higher risk for severe illness?  and given an opportunity for questions and concerns. The patient agrees to contact the COVID-19 hotline [phone] or PCP office for questions related to COVID-19. For more information on steps you can take to protect yourself, see CDC's How to Protect Yourself        Discussed follow-up appointments. If no appointment was previously scheduled, appointment scheduling offered: no - Saw PCP yesterday. [facility] follow up appointment(s): No future appointments. Non-[facility] follow up appointment(s): Cardiology and GI appt's to be scheduled. Plan for follow-up call in 7-10 days based on severity of symptoms and risk factors. CTN provided contact information for future needs. Goals      Attends follow-up appointments as directed.  Supportive resources in place to maintain patient in the community (ie. Home Health, DME equipment, refer to, medication assistant plan, etc.)      [facility] will be following pt. Has DME that was needed - walker/wheelchair.

## 2020-10-28 NOTE — TELEPHONE ENCOUNTER
Requested Prescriptions     Pending Prescriptions Disp Refills    bumetanide (BUMEX) 1 mg tablet        Sig: Take  by mouth daily.  isosorbide dinitrate (ISORDIL) 20 mg tablet        Sig: Take 1 Tab by mouth three (3) times daily.  hydrALAZINE (APRESOLINE) 100 mg tablet        Sig: Take 1 Tab by mouth three (3) times daily.  carvediloL (Coreg) 3.125 mg tablet        Sig: Take  by mouth two (2) times daily (with meals).  losartan (COZAAR) 100 mg tablet 90 Tab 1     Sig: Take 1 Tab by mouth daily.  minoxidiL (LONITEN) 10 mg tablet 90 Tab 0     Sig: Take 1 Tab by mouth daily.  amiodarone (CORDARONE) 200 mg tablet        Sig: Take 1 Tab by mouth daily.  travoprost (Travatan Z) 0.004 % ophthalmic solution        Sig: Administer 1 Drop to both eyes nightly.  brimonidine (ALPHAGAN P) 0.1 % ophthalmic solution        Sig: Administer 1 Drop to both eyes daily.      Pt needs a refill on the following medications has no refills and pt will be completely out by Saturday

## 2020-10-29 RX ORDER — AMLODIPINE BESYLATE 5 MG/1
5 TABLET ORAL DAILY
Qty: 90 TAB | Refills: 1 | Status: SHIPPED | OUTPATIENT
Start: 2020-10-29 | End: 2020-11-23 | Stop reason: ALTCHOICE

## 2020-10-29 RX ORDER — HYDRALAZINE HYDROCHLORIDE 100 MG/1
100 TABLET, FILM COATED ORAL 3 TIMES DAILY
Qty: 90 TAB | Refills: 1 | Status: SHIPPED | OUTPATIENT
Start: 2020-10-29

## 2020-10-29 RX ORDER — ISOSORBIDE DINITRATE 20 MG/1
20 TABLET ORAL 3 TIMES DAILY
Qty: 30 TAB | Refills: 1 | Status: SHIPPED | OUTPATIENT
Start: 2020-10-29 | End: 2020-11-23 | Stop reason: ALTCHOICE

## 2020-10-29 RX ORDER — AMIODARONE HYDROCHLORIDE 200 MG/1
200 TABLET ORAL DAILY
Qty: 30 TAB | Refills: 1 | Status: SHIPPED | OUTPATIENT
Start: 2020-10-29 | End: 2020-11-16 | Stop reason: SDUPTHER

## 2020-10-29 RX ORDER — MINOXIDIL 10 MG/1
10 TABLET ORAL DAILY
Qty: 90 TAB | Refills: 1 | Status: SHIPPED | OUTPATIENT
Start: 2020-10-29 | End: 2020-11-23 | Stop reason: ALTCHOICE

## 2020-10-29 RX ORDER — CARVEDILOL 3.12 MG/1
3.12 TABLET ORAL 2 TIMES DAILY WITH MEALS
Qty: 60 TAB | Refills: 1 | Status: SHIPPED | OUTPATIENT
Start: 2020-10-29 | End: 2020-11-23 | Stop reason: ALTCHOICE

## 2020-10-29 RX ORDER — BUMETANIDE 1 MG/1
1 TABLET ORAL DAILY
Qty: 30 TAB | Refills: 1 | Status: SHIPPED | OUTPATIENT
Start: 2020-10-29 | End: 2020-11-23 | Stop reason: SDUPTHER

## 2020-10-29 RX ORDER — LOSARTAN POTASSIUM 100 MG/1
100 TABLET ORAL DAILY
Qty: 90 TAB | Refills: 1 | Status: SHIPPED | OUTPATIENT
Start: 2020-10-29 | End: 2020-11-23 | Stop reason: ALTCHOICE

## 2020-10-29 NOTE — TELEPHONE ENCOUNTER
Last seen 10/26/20    Med Reconciliation completed w/ daughter patient only has enough pills to last till Saturday. She is scheduled to see Dr. Ania Hamilton w/ [facility] on 11/10/20. Daughter is aware that most of the medications will be filled by Cardiology after that appointment.      Patients BP today was 103/41

## 2020-11-05 ENCOUNTER — TELEPHONE (OUTPATIENT)
Dept: FAMILY MEDICINE CLINIC | Age: 80
End: 2020-11-05

## 2020-11-05 NOTE — TELEPHONE ENCOUNTER
Patients  called stating that the patients bp was 90/56 and they would like a returned call on what to do. Please advise.     Best contact    [phone]

## 2020-11-05 NOTE — TELEPHONE ENCOUNTER
I called and spoke with patient's daughter after they called patient's Bp dropped to 84/52 and she was shaking . They called rescue squad and they took her to the ER . I told daughter to call if she needs to be seen after she is discharged she voiced understanding .

## 2020-11-17 ENCOUNTER — PATIENT OUTREACH (OUTPATIENT)
Dept: CASE MANAGEMENT | Age: 80
End: 2020-11-17

## 2020-11-17 RX ORDER — AMIODARONE HYDROCHLORIDE 200 MG/1
200 TABLET ORAL DAILY
Qty: 30 TAB | Refills: 1 | Status: SHIPPED | OUTPATIENT
Start: 2020-11-17

## 2020-11-17 NOTE — TELEPHONE ENCOUNTER
See below . Last refilled 10/29/20 for 30 with 1 .  Last OV 10/26/20   Future Appointments   Date Time Provider Tavo Vick   11/23/2020 11:30 AM Belen Sher MD [facility]

## 2020-11-17 NOTE — TELEPHONE ENCOUNTER
Call pt or her daughter back. I have sent in the Rx for amiodarone, however, this was a new med started by cardiology, during her recent hospital stay. It is NOT a BP med, just so they are aware. It has to do with the rhythm of her heart. Pt told me she was going to see cardiology soon so they will need to manage this Rx for her in the future.

## 2020-11-17 NOTE — TELEPHONE ENCOUNTER
Pt daughter is calling to check on the status of her medication refill. Pt only has 2 bp pills left and would like to know if get can be sent to the pharmacy by the end of the day.  Daughter was informed that refills can take anywhere from 48-72 hrs

## 2020-11-18 NOTE — TELEPHONE ENCOUNTER
Patient notified and she missed her appointment with the new cardiologist because she was in the hospital and will not see them until February . She will call late December to get one last refill .

## 2020-11-18 NOTE — PROGRESS NOTES
Patient discharged from THE [facility] to [address] [facility], on 10/1/20. She was discharged home from [facility] on 10/25/20. She was readmitted to THE [facility] on 20 and discharged home on 20. Hospital Discharge diagnosis:       Acute Kidney Injury     Hypotension   PAF   Anemia    Outreach made within 2 business days of discharge: Yes    Top Discharge Challenges to be reviewed by the provider   None at this time       Advance Care Planning:   Does patient have an Advance Directive:  none on file at this time    Inpatient Readmission Risk score: 6%  Was this a readmission? yes   Patient stated reason for the admission: weak, blood pressure was low  Patients top risk factors for readmission: medical condition  Interventions to address risk factors: HHN, Follow up appt's with PCP, Specialist    Care Transition Nurse (CTN) contacted the patient by telephone to perform post hospital discharge assessment. Verified name and  with patient as identifiers. Provided introduction to self, and explanation of the CTN role. CTN reviewed discharge instructions, medical action plan and red flags with patient who verbalized understanding. Patient given an opportunity to ask questions and does not have any further questions or concerns at this time. The patient agrees to contact the PCP office for questions related to their healthcare. Medication reconciliation was not performed - pt currently in bathroom and unable to complete at this time.      Referral to Pharm D needed: unclear at this time     Home Health/Outpatient orders at discharge: home health care, PT, OT and [address]: [facility]  Date of initial visit: 20    Durable Medical Equipment ordered at discharge: None  [address]:   [facility] received: n/a    Covid Risk Education    Patient has following risk factors of: heart failure and diabetes. Education provided regarding infection prevention, and signs and symptoms of COVID-19 and when to seek medical attention with patient who verbalized understanding. Discussed exposure protocols and quarantine From CDC: Are you at higher risk for severe illness?  and given an opportunity for questions and concerns. The patient agrees to contact the COVID-19 hotline [phone] or PCP office for questions related to COVID-19. For more information on steps you can take to protect yourself, see CDC's How to Protect Yourself     Discussed follow-up appointments. If no appointment was previously scheduled, appointment scheduling offered: no - Patient has f/u on Monday 11/23/20  [facility] follow up appointment(s):   Future Appointments   Date Time Provider Tavo Vick   11/23/2020 11:30 AM MD DAVID McneilMA BS [facility]     Non-[facility] follow up appointment(s): Nephrology in 2-3 weeks. Plan for follow-up call in 1-2 days  - to complete medication reconciliation if possible   CTN provided contact information for future needs. Goals      Attends follow-up appointments as directed.  Supportive resources in place to maintain patient in the community (ie. Home Health, DME equipment, refer to, medication assistant plan, etc.)      [facility] will be following pt. Has DME that was needed - walker/wheelchair.

## 2020-11-23 ENCOUNTER — TELEPHONE (OUTPATIENT)
Dept: FAMILY MEDICINE CLINIC | Age: 80
End: 2020-11-23

## 2020-11-23 ENCOUNTER — OFFICE VISIT (OUTPATIENT)
Dept: FAMILY MEDICINE CLINIC | Age: 80
End: 2020-11-23
Payer: MEDICARE

## 2020-11-23 VITALS
HEART RATE: 51 BPM | SYSTOLIC BLOOD PRESSURE: 150 MMHG | TEMPERATURE: 97 F | OXYGEN SATURATION: 100 % | BODY MASS INDEX: 37 KG/M2 | RESPIRATION RATE: 22 BRPM | WEIGHT: 196 LBS | HEIGHT: 61 IN | DIASTOLIC BLOOD PRESSURE: 68 MMHG

## 2020-11-23 DIAGNOSIS — E11.21 TYPE 2 DIABETES WITH NEPHROPATHY (HCC): ICD-10-CM

## 2020-11-23 DIAGNOSIS — I10 ESSENTIAL HYPERTENSION: ICD-10-CM

## 2020-11-23 DIAGNOSIS — N17.9 ACUTE KIDNEY INJURY (HCC): Primary | ICD-10-CM

## 2020-11-23 DIAGNOSIS — I48.0 PAROXYSMAL ATRIAL FIBRILLATION (HCC): ICD-10-CM

## 2020-11-23 DIAGNOSIS — D63.8 ANEMIA OF CHRONIC DISEASE: ICD-10-CM

## 2020-11-23 PROBLEM — E66.01 SEVERE OBESITY (HCC): Status: RESOLVED | Noted: 2020-10-26 | Resolved: 2020-11-23

## 2020-11-23 PROCEDURE — 99496 TRANSJ CARE MGMT HIGH F2F 7D: CPT | Performed by: INTERNAL MEDICINE

## 2020-11-23 PROCEDURE — 99495 TRANSJ CARE MGMT MOD F2F 14D: CPT | Performed by: INTERNAL MEDICINE

## 2020-11-23 PROCEDURE — G8427 DOCREV CUR MEDS BY ELIG CLIN: HCPCS | Performed by: INTERNAL MEDICINE

## 2020-11-23 RX ORDER — NIFEDIPINE 60 MG/1
60 TABLET, EXTENDED RELEASE ORAL DAILY
COMMUNITY
End: 2020-11-23 | Stop reason: SDUPTHER

## 2020-11-23 RX ORDER — BUMETANIDE 2 MG/1
2 TABLET ORAL DAILY
Qty: 90 TAB | Refills: 2 | Status: SHIPPED | OUTPATIENT
Start: 2020-11-23

## 2020-11-23 RX ORDER — NIFEDIPINE 60 MG/1
60 TABLET, EXTENDED RELEASE ORAL DAILY
Qty: 90 TAB | Refills: 2 | Status: SHIPPED | OUTPATIENT
Start: 2020-11-23

## 2020-11-23 RX ORDER — SENNOSIDES 8.6 MG/1
1 TABLET ORAL DAILY
COMMUNITY

## 2020-11-23 NOTE — TELEPHONE ENCOUNTER
Please assist patient. She needs to set up a f/u nephrology appt with Dr. Wendy Escalera. She was recently hospitalized with acute on chronic renal insufficiency. Please call their office and either make her an appt or they can call her direct. get a name on the person you speak with pt will know who to expect a call from.

## 2020-11-23 NOTE — PROGRESS NOTES
Isiah Shetty is a 67 y.o. female (: 1940) presenting to address:    Chief Complaint   Patient presents with   [facility] Follow Up     went for low BP .has follow up with cardiology but not until february . Vitals:    20 1124   BP: (!) 150/68   Pulse: (!) 51   Resp: 22   Temp: 97 °F (36.1 °C)   TempSrc: Temporal   SpO2: 100%   Weight: 196 lb (88.9 kg)   Height: 5' 1\" (1.549 m)   PainSc:   0 - No pain       Hearing/Vision:   No exam data present    Learning Assessment:     Learning Assessment 2015   PRIMARY LEARNER Patient   HIGHEST LEVEL OF EDUCATION - PRIMARY LEARNER  DID NOT GRADUATE HIGH SCHOOL   BARRIERS PRIMARY LEARNER NONE   CO-LEARNER CAREGIVER No   PRIMARY LANGUAGE ENGLISH    NEED No   LEARNER PREFERENCE PRIMARY DEMONSTRATION   ANSWERED BY Patient   RELATIONSHIP SELF     Depression Screening:     3 most recent PHQ Screens 2020   Little interest or pleasure in doing things Not at all   Feeling down, depressed, irritable, or hopeless Not at all   Total Score PHQ 2 0     Fall Risk Assessment:     Fall Risk Assessment, last 12 mths 2019   Able to walk? Yes   Fall in past 12 months? No   Fall with injury? -   Number of falls in past 12 months -   Fall Risk Score -     Abuse Screening:     Abuse Screening Questionnaire 2018   Do you ever feel afraid of your partner? N   Are you in a relationship with someone who physically or mentally threatens you? N   Is it safe for you to go home? Y     Coordination of Care Questionaire:   1. Have you been to the ER, urgent care clinic since your last visit? Hospitalized since your last visit? Yes     2. Have you seen or consulted any other health care providers outside of the [address] [place] [address] since your last visit? Include any pap smears or colon screening. NO    Advanced Directive:   1. Do you have an Advanced Directive? NO    2. Would you like information on Advanced Directives?  NO

## 2020-11-23 NOTE — PROGRESS NOTES
Assessment/Plan:    *Diagnoses and all orders for this visit:    1. Acute kidney injury ([facility]    2. Type 2 diabetes with nephropathy ([facility] [facility]    3. Essential hypertension    4. Paroxysmal atrial fibrillation (HCC)    5. Anemia of chronic disease    Other orders  -     apixaban (ELIQUIS) 2.5 mg tablet; Take 1 Tab by mouth two (2) times a day. -     NIFEdipine ER (PROCARDIA XL) 60 mg ER tablet; Take 1 Tab by mouth daily. -     bumetanide (BUMEX) 2 mg tablet; Take 1 Tab by mouth daily. Pt will f/u on a virtual call in 1 month. Corrected her on her Eliquis in that it should be 2.5 mg BID. The plan was discussed with the patient. The patient verbalized understanding and is in agreement with the plan. All medication potential side effects were discussed with the patient.    -------------------------------------------------------------------------------------------------------------------        Rick Westfall is a 67 y.o. female and presents with Hospital Follow Up (went for low BP .has follow up with cardiology but not until february . )         Subjective:  Pt seen today for a JESSICA visit. Pt outreach was made on 11/17/20 by Gray Lam RN. Pt was admitted from 11/5/20 to 11/14/20 for acute kidney injury on CKD Stage III. She is under Dr. Jorge quinn and apparently had just been to see him. Due to swelling issues and concerns about volume overload, he had just adjusted her diuretic regimen. She later became hypotensive, weak, was feeling short of breath and generally unwell. She was recently hospitalized for a parastomal hernia and was discharged to rehab for a while. Had hyponatremia, which improved. Her hypotension was 2/2 dehydration, now better. Cre was initially 5, but has trended down to 2.5 upon discharge. A Fib: on eliquis, managed by her cardiologist.  Anemia associated with CKD. She is feeling better today.           Review of Systems - General ROS: negative         The problem list was updated as a part of today's visit. Patient Active Problem List   Diagnosis Code    Iron deficiency anemia D50.9    Type 2 diabetes mellitus without complication, without long-term current use of insulin ([facility] [address] E11.9    Blood in stool K92.1    Secondary localized osteoarthrosis, lower leg M17.5    Unspecified otitis media H66.90    BPPV (benign paroxysmal positional vertigo) H81.10    Type 2 diabetes with nephropathy ([facility]) E11.21    Colostomy present ([facility]) Z93.3    Acute kidney injury ([facility] [address] N17.9    Anemia of chronic disease D63.8    GERD without esophagitis K21.9    Hypoglycemia E16.2    Essential hypertension I10    Hyperkalemia E87.5    Paroxysmal atrial fibrillation (HCC) I48.0    Chronic systolic heart failure ([facility]) I50.22    Chronic renal failure syndrome, stage 3 (moderate) N18.30       The PSH, FH were reviewed. SH:  Social History     Tobacco Use    Smoking status: Never Smoker    Smokeless tobacco: Never Used   Substance Use Topics    Alcohol use: No    Drug use: No       Medications/Allergies:  Current Outpatient Medications on File Prior to Visit   Medication Sig Dispense Refill    senna (Senna) 8.6 mg tablet Take 1 Tab by mouth daily.  amiodarone (CORDARONE) 200 mg tablet Take 1 Tab by mouth daily. 30 Tab 1    hydrALAZINE (APRESOLINE) 100 mg tablet Take 1 Tab by mouth three (3) times daily. 90 Tab 1    famotidine (PEPCID) 20 mg tablet Take 20 mg by mouth daily.  Lumigan 0.01 % ophthalmic drops Administer 1 Drop to both eyes every evening.  colesevelam (WELCHOL) 625 mg tablet Take 625 mg by mouth two (2) times a week.  ergocalciferol (Vitamin D2) 1,250 mcg (50,000 unit) capsule take 1 capsule by mouth every 7 days 12 Cap 2    carboxymethylcellulos/glycerin (REFRESH OPTIVE OP) Apply 1 Drop to eye two (2) times a day.  Iron BisGl &PS Bax-Z-D01-[id number] (Claudio Forte) 265-81-00-1 mg-mg-mcg-mg cap Take 2 Caps by mouth daily.  180 Cap 1    albuterol (PROVENTIL HFA, VENTOLIN HFA, PROAIR HFA) 90 mcg/actuation inhaler Take 2 Puffs by inhalation every six (6) hours as needed for Wheezing. 2 Inhaler 2    brimonidine (ALPHAGAN P) 0.1 % ophthalmic solution Administer 1 Drop to both eyes daily.  Blood-Glucose Meter (FREESTYLE LITE METER) monitoring kit For three times a day testing    DX: Z91.89;  E16.2, 1 Kit 0    alcohol swabs (ALCOHOL PADS) padm For three times a day testing  DX: Z91.89;  E16.2, 300 Pad 2    [DISCONTINUED] NIFEdipine ER (PROCARDIA XL) 60 mg ER tablet Take 60 mg by mouth daily.  [DISCONTINUED] bumetanide (BUMEX) 1 mg tablet Take 1 Tab by mouth daily. (Patient taking differently: Take 2 mg by mouth daily.) 30 Tab 1    [DISCONTINUED] isosorbide dinitrate (ISORDIL) 20 mg tablet Take 1 Tab by mouth three (3) times daily. 30 Tab 1    [DISCONTINUED] carvediloL (Coreg) 3.125 mg tablet Take 1 Tab by mouth two (2) times daily (with meals). 60 Tab 1    [DISCONTINUED] losartan (COZAAR) 100 mg tablet Take 1 Tab by mouth daily. 90 Tab 1    [DISCONTINUED] minoxidiL (LONITEN) 10 mg tablet Take 1 Tab by mouth daily. 90 Tab 1    [DISCONTINUED] amLODIPine (Norvasc) 5 mg tablet Take 1 Tab by mouth daily. 90 Tab 1    [DISCONTINUED] apixaban (ELIQUIS) 2.5 mg tablet Take 2.5 mg by mouth two (2) times a day.  [DISCONTINUED] magnesium hydroxide (MILK OF MAGNESIA) 400 mg/5 mL suspension Take 30 mL by mouth as needed.  [DISCONTINUED] melatonin 3 mg tablet Take 6 mg by mouth nightly as needed.  [DISCONTINUED] traMADoL (ULTRAM) 50 mg tablet Take 50 mg by mouth every twelve (12) hours as needed. q 12 hrs as needed up to 30 doses      [DISCONTINUED] triamcinolone acetonide (KENALOG) 0.1 % topical cream Apply 1 Applicator to affected area daily.  [DISCONTINUED] hydroCHLOROthiazide (HYDRODIURIL) 25 mg tablet Take 25 mg by mouth daily.  Indications: high blood pressure, Started 03/13/2019/ restarted by Dr Junior Bowen 3/27/2019      Elsa Maynard travoprost (TRAVATAN Z) 0.004 % ophthalmic solution Administer 1 Drop to both eyes nightly. No current facility-administered medications on file prior to visit. Allergies   Allergen Reactions    Adhesive Other (comments)     Skin tears    Amlodipine Other (comments)     Severe Headache     Amoxicillin Hives    Azelastine Itching     Eye Redness     Coricidin Hbp [Dextromethorphan-Guaifenesin] Other (comments)     Headache          Health Maintenance:   Health Maintenance   Topic Date Due    Shingrix Vaccine Age 49> (1 of 2) 12/03/1991    Pneumococcal 65+ years (1 of 1 - PPSV23) 12/03/2006    Medicare Yearly Exam  09/11/2020    GLAUCOMA SCREENING Q2Y  06/03/2021    DTaP/Tdap/Td series (2 - Td) 08/17/2025    Bone Densitometry (Dexa) Screening  Completed    Flu Vaccine  Completed       Objective:  Visit Vitals  BP (!) 150/68   Pulse (!) 51   Temp 97 °F (36.1 °C) (Temporal)   Resp 22   Ht 5' 1\" (1.549 m)   Wt 196 lb (88.9 kg)   SpO2 100%   BMI 37.03 kg/m²          Nurses notes and VS reviewed. Physical Examination: General appearance - alert, well appearing, and in no distress  Chest - clear to auscultation, no wheezes, rales or rhonchi, symmetric air entry  Heart - normal rate and regular rhythm  Abdomen - colostomy bag in place  Extremities - pedal edema very mild; wearing compression socks.           Labwork and Ancillary Studies:    CBC w/Diff  Lab Results   Component Value Date/Time    WBC 6.0 09/11/2020 10:17 AM    HGB 10.2 (L) 09/11/2020 10:17 AM    PLATELET 702 38/21/8393 10:17 AM         Basic Metabolic Profile  Lab Results   Component Value Date/Time    Sodium 143 09/11/2020 10:17 AM    Potassium 4.8 09/11/2020 10:17 AM    Chloride 112 (H) 09/11/2020 10:17 AM    CO2 23 09/11/2020 10:17 AM    Anion gap 8 09/11/2020 10:17 AM    Glucose 93 09/11/2020 10:17 AM    BUN 48 (H) 09/11/2020 10:17 AM    Creatinine 2.37 (H) 09/11/2020 10:17 AM    BUN/Creatinine ratio 20 09/11/2020 10:17 AM GFR est AA 24 (L) 09/11/2020 10:17 AM    GFR est non-AA 20 (L) 09/11/2020 10:17 AM    Calcium 9.1 09/11/2020 10:17 AM         LFT  Lab Results   Component Value Date/Time    ALT (SGPT) 35 09/11/2020 10:17 AM    Alk.  phosphatase 58 09/11/2020 10:17 AM    Bilirubin, direct 0.2 09/11/2020 10:17 AM    Bilirubin, total 0.5 09/11/2020 10:17 AM         Cholesterol  Lab Results   Component Value Date/Time    Cholesterol, total 233 (H) 09/11/2020 10:17 AM    HDL Cholesterol 114 (H) 09/11/2020 10:17 AM    LDL, calculated 99 09/11/2020 10:17 AM    Triglyceride 100 09/11/2020 10:17 AM    CHOL/HDL Ratio 2.0 09/11/2020 10:17 AM

## 2020-11-30 DIAGNOSIS — N18.30 CHRONIC RENAL FAILURE SYNDROME, STAGE 3 (MODERATE) (HCC): Primary | ICD-10-CM

## 2020-11-30 NOTE — TELEPHONE ENCOUNTER
Since the appt is not very soon, I have ordered a repeat lab for her to check her kidneys again. I would like her to do this within the next 2 weeks.

## 2020-11-30 NOTE — TELEPHONE ENCOUNTER
I called to scheduled patient for follow up and office told me she is already scheduled for 1/22 with Dr Ryne Dodge .

## 2020-12-02 ENCOUNTER — TELEPHONE (OUTPATIENT)
Dept: FAMILY MEDICINE CLINIC | Age: 80
End: 2020-12-02

## 2020-12-02 NOTE — TELEPHONE ENCOUNTER
Hector Johnson from [facility] called to report that she sent the patient to the ER this morning because her bp was low @ 108/38 and she was nauseated. That was at the end of their session. Her vitals were normal at the the beginning. She isn't sure if this is was from the  giving her the medicaion without checking her bp first. She has to take her medication but only when it is in a certain parameter.      She believes she went to [facility] ER.

## 2020-12-06 NOTE — TELEPHONE ENCOUNTER
Please contact pt and her . She has typically always struggled with too high a BP. Now, it seems to be getting low. If this is happening on a regular basis, I suggest that we cut the Hydralazine from 100 mg to 50 mg. The other meds can be continued the same way. They will need to continue monitoring daily at home.

## 2020-12-07 NOTE — TELEPHONE ENCOUNTER
I spoke with patient and daughter and notified them of Dr Trevor Scanlon message to cut hydralazine to 50 mg 3 times daily . Patient and daughter  voiced understanding .

## 2020-12-08 ENCOUNTER — TELEPHONE (OUTPATIENT)
Dept: FAMILY MEDICINE CLINIC | Age: 80
End: 2020-12-08

## 2020-12-08 NOTE — TELEPHONE ENCOUNTER
Sariah Solomon from [facility] called to let Dr. Mady Can know that the pt has met the OT goals and will now be discharged from that portion of home health.

## 2020-12-11 ENCOUNTER — PATIENT OUTREACH (OUTPATIENT)
Dept: CASE MANAGEMENT | Age: 80
End: 2020-12-11

## 2020-12-14 ENCOUNTER — HOSPITAL ENCOUNTER (OUTPATIENT)
Dept: LAB | Age: 80
Discharge: HOME | End: 2020-12-14
Payer: MEDICARE

## 2020-12-14 ENCOUNTER — APPOINTMENT (OUTPATIENT)
Dept: FAMILY MEDICINE CLINIC | Age: 80
End: 2020-12-14

## 2020-12-14 DIAGNOSIS — N18.30 CHRONIC RENAL FAILURE SYNDROME, STAGE 3 (MODERATE) (HCC): ICD-10-CM

## 2020-12-14 LAB
ANION GAP SERPL CALC-SCNC: 8 MMOL/L (ref 3–18)
BUN SERPL-MCNC: 37 MG/DL (ref 7–18)
BUN/CREAT SERPL: 14 (ref 12–20)
CALCIUM SERPL-MCNC: 9.5 MG/DL (ref 8.5–10.1)
CHLORIDE SERPL-SCNC: 104 MMOL/L (ref 100–111)
CO2 SERPL-SCNC: 27 MMOL/L (ref 21–32)
CREAT SERPL-MCNC: 2.63 MG/DL (ref 0.6–1.3)
GLUCOSE SERPL-MCNC: 83 MG/DL (ref 74–99)
POTASSIUM SERPL-SCNC: 4.3 MMOL/L (ref 3.5–5.5)
SODIUM SERPL-SCNC: 139 MMOL/L (ref 136–145)

## 2020-12-14 PROCEDURE — 80048 BASIC METABOLIC PNL TOTAL CA: CPT

## 2020-12-14 PROCEDURE — 36415 COLL VENOUS BLD VENIPUNCTURE: CPT

## 2020-12-15 NOTE — PROGRESS NOTES
Notify pt that her Cre is stable but still higher than what it has been. When does she see her kidney doctor again? She needs to f/u sooner.

## 2020-12-15 NOTE — PROGRESS NOTES
Patient notified of results she is scheduled to see Dr Natalie Olvera on 1/22 . I faxed copy of lab to his office staff seeing if they can get her in sooner . Patient will also follow up with them .

## 2020-12-21 ENCOUNTER — VIRTUAL VISIT (OUTPATIENT)
Dept: FAMILY MEDICINE CLINIC | Age: 80
End: 2020-12-21
Payer: MEDICARE

## 2020-12-21 DIAGNOSIS — I10 ESSENTIAL HYPERTENSION: Primary | ICD-10-CM

## 2020-12-21 PROCEDURE — 99443 PR PHYS/QHP TELEPHONE EVALUATION 21-30 MIN: CPT | Performed by: INTERNAL MEDICINE

## 2020-12-21 NOTE — PROGRESS NOTES
Farooq Ware is a 79 y.o. female, evaluated via audio-only technology on 12/21/2020 for Hypotension  . Assessment & Plan:   Diagnoses and all orders for this visit:    1. Essential hypertension      [id number]  Subjective:       Pt was spoken to by phone. Also, spoke to her  as he helps manage her meds. She was seen in the hospital a few weeks ago. She has since also been seen by Dr. Maryjane Martinez. Who did not make any changes to meds. We had her reduce the hydralazine to 50 mg TID.  confirms that she is better with this dose. Prior to Admission medications    Medication Sig Start Date End Date Taking? Authorizing Provider   senna (Senna) 8.6 mg tablet Take 1 Tab by mouth daily. Provider, Historical   apixaban (ELIQUIS) 2.5 mg tablet Take 1 Tab by mouth two (2) times a day. 11/23/20   Mirian Garay MD   NIFEdipine ER (PROCARDIA XL) 60 mg ER tablet Take 1 Tab by mouth daily. 11/23/20   Mirian Garay MD   bumetanide (BUMEX) 2 mg tablet Take 1 Tab by mouth daily. 11/23/20   Mirian Garay MD   amiodarone (CORDARONE) 200 mg tablet Take 1 Tab by mouth daily. 11/17/20   Mirian Garay MD   hydrALAZINE (APRESOLINE) 100 mg tablet Take 1 Tab by mouth three (3) times daily. 10/29/20   Mirian Garay MD   famotidine (PEPCID) 20 mg tablet Take 20 mg by mouth daily. 10/1/20   Provider, Historical   Lumigan 0.01 % ophthalmic drops Administer 1 Drop to both eyes every evening. 8/28/20   Provider, Historical   colesevelam (WELCHOL) 625 mg tablet Take 625 mg by mouth two (2) times a week. Provider, Historical   ergocalciferol (Vitamin D2) 1,250 mcg (50,000 unit) capsule take 1 capsule by mouth every 7 days 8/26/20   Mirian Garay MD   carboxymethylcellulos/glycerin (REFRESH OPTIVE OP) Apply 1 Drop to eye two (2) times a day. Provider, Historical   Iron BisGl &PS Huf-V-M05-FA-Ca (Claudio Forte) 968-86-84-5 mg-mg-mcg-mg cap Take 2 Caps by mouth daily.  7/9/20   Ignacio Park MD LEAH   albuterol (PROVENTIL HFA, VENTOLIN HFA, PROAIR HFA) 90 mcg/actuation inhaler Take 2 Puffs by inhalation every six (6) hours as needed for Wheezing. 7/9/20   Shannon Ribeiro MD   brimonidine (ALPHAGAN P) 0.1 % ophthalmic solution Administer 1 Drop to both eyes daily.     Provider, Historical   Blood-Glucose Meter (FREESTYLE LITE METER) monitoring kit For three times a day testing    DX: Z91.89;  E16.2, 2/20/19   Shannon Ribeiro MD   alcohol swabs (ALCOHOL PADS) padm For three times a day testing  DX: Z91.89;  E16.2, 2/20/19   Shannon Ribeiro MD     Patient Active Problem List   Diagnosis Code    Iron deficiency anemia D50.9    Type 2 diabetes mellitus without complication, without long-term current use of insulin ([address] E11.9    Blood in stool K92.1    Secondary localized osteoarthrosis, lower leg M17.5    Unspecified otitis media H66.90    BPPV (benign paroxysmal positional vertigo) H81.10    Type 2 diabetes with nephropathy (HCC) E11.21    Colostomy present ([address] Z93.3    Acute kidney injury ([address] N17.9    Anemia of chronic disease D63.8    GERD without esophagitis K21.9    Hypoglycemia E16.2    Essential hypertension I10    Hyperkalemia E87.5    Paroxysmal atrial fibrillation (HCC) I48.0    Chronic systolic heart failure (HCC) I50.22    Chronic renal failure syndrome, stage 3 (moderate) N18.30     Patient Active Problem List    Diagnosis Date Noted    Chronic systolic heart failure ([address] 10/2020    Chronic renal failure syndrome, stage 3 (moderate) 10/2020    Paroxysmal atrial fibrillation ([address] 2020    Hyperkalemia 03/20/2019    Essential hypertension 02/25/2019    Acute kidney injury ([address] 02/16/2019    Anemia of chronic disease 02/16/2019    GERD without esophagitis 02/16/2019    Hypoglycemia 02/16/2019    Colostomy present ([address]     Type 2 diabetes with nephropathy ([address] 03/20/2018    BPPV (benign paroxysmal positional vertigo) 12/04/2016    Iron deficiency anemia 05/22/2014  Type 2 diabetes mellitus without complication, without long-term current use of insulin ([facility] [address] 05/22/2014    Blood in stool 05/22/2014    Secondary localized osteoarthrosis, lower leg 05/22/2014    Unspecified otitis media 05/22/2014     Current Outpatient Medications   Medication Sig Dispense Refill    senna (Senna) 8.6 mg tablet Take 1 Tab by mouth daily.  apixaban (ELIQUIS) 2.5 mg tablet Take 1 Tab by mouth two (2) times a day. 180 Tab 0    NIFEdipine ER (PROCARDIA XL) 60 mg ER tablet Take 1 Tab by mouth daily. 90 Tab 2    bumetanide (BUMEX) 2 mg tablet Take 1 Tab by mouth daily. 90 Tab 2    amiodarone (CORDARONE) 200 mg tablet Take 1 Tab by mouth daily. 30 Tab 1    hydrALAZINE (APRESOLINE) 100 mg tablet Take 1 Tab by mouth three (3) times daily. 90 Tab 1    famotidine (PEPCID) 20 mg tablet Take 20 mg by mouth daily.  Lumigan 0.01 % ophthalmic drops Administer 1 Drop to both eyes every evening.  colesevelam (WELCHOL) 625 mg tablet Take 625 mg by mouth two (2) times a week.  ergocalciferol (Vitamin D2) 1,250 mcg (50,000 unit) capsule take 1 capsule by mouth every 7 days 12 Cap 2    carboxymethylcellulos/glycerin (REFRESH OPTIVE OP) Apply 1 Drop to eye two (2) times a day.  Iron BisGl &PS Ane-D-G87-FA-Ca (Claudio Forte) 430-58-48-7 mg-mg-mcg-mg cap Take 2 Caps by mouth daily. 180 Cap 1    albuterol (PROVENTIL HFA, VENTOLIN HFA, PROAIR HFA) 90 mcg/actuation inhaler Take 2 Puffs by inhalation every six (6) hours as needed for Wheezing. 2 Inhaler 2    brimonidine (ALPHAGAN P) 0.1 % ophthalmic solution Administer 1 Drop to both eyes daily.       Blood-Glucose Meter (FREESTYLE LITE METER) monitoring kit For three times a day testing    DX: Z91.89;  E16.2, 1 Kit 0    alcohol swabs (ALCOHOL PADS) padm For three times a day testing  DX: Z91.89;  E16.2, 300 Pad 2     Allergies   Allergen Reactions    Adhesive Other (comments)     Skin tears    Amlodipine Other (comments)     Severe Headache     Amoxicillin Hives    Azelastine Itching     Eye Redness     Coricidin Hbp [Dextromethorphan-Guaifenesin] Other (comments)     Headache      Past Medical History:   Diagnosis Date    Cancer [facility])     colon    Chronic renal failure syndrome, stage 3 (moderate) 10/2020    sees cardiology    Chronic systolic heart failure ([facility] [address] 10/2020    EF 35%.  Colostomy present ([facility] [facility]     Diabetes ([facility] [address]     Hypertension     [facility] 2020    on amiodarone and Xarelto. Past Surgical History:   Procedure Laterality Date    ABDOMEN SURGERY PROC UNLISTED      colectomy    ENDOSCOPY, COLON, DIAGNOSTIC       No family history on file. Social History     Tobacco Use    Smoking status: Never Smoker    Smokeless tobacco: Never Used   Substance Use Topics    Alcohol use: No       ROS    No flowsheet data found. Patrick Baxter, who was evaluated through a patient-initiated, synchronous (real-time) audio only encounter, and/or her healthcare decision maker, is aware that it is a billable service, with coverage as determined by her insurance carrier. She provided verbal consent to proceed: Yes. She has not had a related appointment within my department in the past 7 days or scheduled within the next 24 hours.       Total Time: minutes: 21-30 minutes    Kenneth Del Angel MD

## 2021-10-18 ENCOUNTER — APPOINTMENT (OUTPATIENT)
Dept: PHYSICAL THERAPY | Age: 81
End: 2021-10-18

## 2022-07-22 NOTE — TELEPHONE ENCOUNTER
Pt is complaining of coughing and rattling in her chest; says she had the cold since last week and she can't get rid of it; pt also says Dr Trey Vasquez was supposed to be getting something to help with her iron; please advise
Scheduled for tomorrow
[place]